# Patient Record
Sex: FEMALE | Race: WHITE | Employment: PART TIME | URBAN - METROPOLITAN AREA
[De-identification: names, ages, dates, MRNs, and addresses within clinical notes are randomized per-mention and may not be internally consistent; named-entity substitution may affect disease eponyms.]

---

## 2017-02-28 PROBLEM — E78.00 PRIMARY HYPERCHOLESTEROLEMIA: Status: ACTIVE | Noted: 2017-02-28

## 2017-05-01 PROBLEM — M25.511 CHRONIC RIGHT SHOULDER PAIN: Status: ACTIVE | Noted: 2017-05-01

## 2017-05-01 PROBLEM — G89.29 CHRONIC PAIN OF RIGHT KNEE: Status: ACTIVE | Noted: 2017-05-01

## 2017-05-01 PROBLEM — M25.561 CHRONIC PAIN OF RIGHT KNEE: Status: ACTIVE | Noted: 2017-05-01

## 2017-05-01 PROBLEM — G89.29 CHRONIC RIGHT SHOULDER PAIN: Status: ACTIVE | Noted: 2017-05-01

## 2017-06-09 PROBLEM — M75.40 SHOULDER IMPINGEMENT: Status: ACTIVE | Noted: 2017-06-09

## 2017-06-09 PROBLEM — M25.819 SHOULDER IMPINGEMENT: Status: ACTIVE | Noted: 2017-06-09

## 2017-06-09 PROBLEM — M17.11 PRIMARY OSTEOARTHRITIS OF RIGHT KNEE: Status: ACTIVE | Noted: 2017-06-09

## 2018-03-22 ENCOUNTER — OFFICE VISIT (OUTPATIENT)
Dept: FAMILY MEDICINE CLINIC | Age: 43
End: 2018-03-22
Payer: COMMERCIAL

## 2018-03-22 VITALS
SYSTOLIC BLOOD PRESSURE: 107 MMHG | HEIGHT: 63 IN | DIASTOLIC BLOOD PRESSURE: 60 MMHG | OXYGEN SATURATION: 98 % | RESPIRATION RATE: 16 BRPM | BODY MASS INDEX: 30.12 KG/M2 | WEIGHT: 170 LBS | TEMPERATURE: 97 F | HEART RATE: 54 BPM

## 2018-03-22 DIAGNOSIS — F41.1 GENERALIZED ANXIETY DISORDER: ICD-10-CM

## 2018-03-22 DIAGNOSIS — R51.9 CHRONIC DAILY HEADACHE: ICD-10-CM

## 2018-03-22 DIAGNOSIS — E78.00 PRIMARY HYPERCHOLESTEROLEMIA: Primary | ICD-10-CM

## 2018-03-22 DIAGNOSIS — M79.7 FIBROMYALGIA: ICD-10-CM

## 2018-03-22 PROCEDURE — 99213 OFFICE O/P EST LOW 20 MIN: CPT | Performed by: FAMILY MEDICINE

## 2018-03-22 PROCEDURE — 1036F TOBACCO NON-USER: CPT | Performed by: FAMILY MEDICINE

## 2018-03-22 PROCEDURE — G8427 DOCREV CUR MEDS BY ELIG CLIN: HCPCS | Performed by: FAMILY MEDICINE

## 2018-03-22 PROCEDURE — G8484 FLU IMMUNIZE NO ADMIN: HCPCS | Performed by: FAMILY MEDICINE

## 2018-03-22 PROCEDURE — G8417 CALC BMI ABV UP PARAM F/U: HCPCS | Performed by: FAMILY MEDICINE

## 2018-03-22 RX ORDER — MAPROTILINE HYDROCHLORIDE 50 MG/1
TABLET, FILM COATED ORAL
COMMUNITY
Start: 2018-03-19 | End: 2018-03-22

## 2018-03-22 NOTE — PATIENT INSTRUCTIONS
LOW SALT  AND LOW FAT DIET. CONTINUE CURRENT MEDICATIONS TAKING REGULARLY. REGULAR WALKING ADVISED. DISCUSSED AND ADVISED RELAXATION. OK TO GO TO PAIN CLINIC FOR PAIN MANAGEMENT. KEEP NEXT APPOINTMENT IN 2 MONTHS.

## 2018-03-22 NOTE — PROGRESS NOTES
OFFICE PROGRESS NOTE      SUBJECTIVE:        Patient ID:   Chucho Diallo is a 38 y.o. female who presents for   Chief Complaint   Patient presents with    Referral - General     pain managment [facility]    Abdominal Pain           HPI:     HAS PROBLEM WITH PAIN MANAGEMENT. PAIN IS SECONDARY TO FIBROMYALGIA. WATCHING DIET GOOD. NO  EXERCISE. TAKING SOME MEDICATIONS REGULARLY. CANNOT AFFORD OTHER MEDICATIONS BECAUSE OF LOSS OF INSURANCE. Prior to Admission medications    Medication Sig Start Date End Date Taking? Authorizing Provider   LORazepam (ATIVAN) 1 MG tablet Take 1 mg by mouth 2 times daily .  11/24/17  Yes Historical Provider, MD   tiZANidine (ZANAFLEX) 4 MG tablet Take 1 tablet by mouth every 6 hours as needed (tension headache) 8/14/17  Yes Yi Martines MD   amphetamine-dextroamphetamine (ADDERALL) 10 MG tablet take 1 tablet by mouth every morning 10/10/16  Yes Historical Provider, MD   vitamin D (CHOLECALCIFEROL) 1000 UNIT TABS tablet Take 1,000 Units by mouth daily   Yes Historical Provider, MD   gabapentin (NEURONTIN) 400 MG capsule Take 800 mg by mouth 4 times daily    Yes Historical Provider, MD   lubiprostone (AMITIZA) 24 MCG capsule Take 24 mcg by mouth daily (with breakfast)   Yes Historical Provider, MD     Social History     Social History    Marital status: Single     Spouse name: N/A    Number of children: N/A    Years of education: N/A     Social History Main Topics    Smoking status: Never Smoker    Smokeless tobacco: Never Used      Comment: patient is not a smoker    Alcohol use No    Drug use: No    Sexual activity: Not Asked     Other Topics Concern    None     Social History Narrative    None       I have reviewed Gualberto's allergies, medications, problem list, medical, social and family history and have updated as needed in the electronic medical record  Review Of Systems:    Skin: no abnormal pigmentation,

## 2018-03-28 ENCOUNTER — HOSPITAL ENCOUNTER (OUTPATIENT)
Dept: GENERAL RADIOLOGY | Age: 43
Discharge: HOME | End: 2018-03-30
Payer: COMMERCIAL

## 2018-03-28 ENCOUNTER — HOSPITAL ENCOUNTER (OUTPATIENT)
Age: 43
Discharge: HOME | End: 2018-03-30
Payer: COMMERCIAL

## 2018-03-28 DIAGNOSIS — R10.9 ABDOMINAL PAIN, UNSPECIFIED ABDOMINAL LOCATION: ICD-10-CM

## 2018-03-28 DIAGNOSIS — K59.00 CONSTIPATION, UNSPECIFIED CONSTIPATION TYPE: ICD-10-CM

## 2018-03-28 PROCEDURE — 74018 RADEX ABDOMEN 1 VIEW: CPT

## 2018-05-08 ENCOUNTER — TELEPHONE (OUTPATIENT)
Dept: FAMILY MEDICINE CLINIC | Age: 43
End: 2018-05-08

## 2018-05-09 ENCOUNTER — OFFICE VISIT (OUTPATIENT)
Dept: FAMILY MEDICINE CLINIC | Age: 43
End: 2018-05-09
Payer: COMMERCIAL

## 2018-05-09 VITALS
RESPIRATION RATE: 16 BRPM | BODY MASS INDEX: 28.84 KG/M2 | SYSTOLIC BLOOD PRESSURE: 118 MMHG | OXYGEN SATURATION: 99 % | HEART RATE: 61 BPM | WEIGHT: 162.75 LBS | HEIGHT: 63 IN | TEMPERATURE: 97.8 F | DIASTOLIC BLOOD PRESSURE: 72 MMHG

## 2018-05-09 DIAGNOSIS — F13.930 BENZODIAZEPINE WITHDRAWAL WITHOUT COMPLICATION (HCC): Primary | ICD-10-CM

## 2018-05-09 PROCEDURE — G8427 DOCREV CUR MEDS BY ELIG CLIN: HCPCS | Performed by: NURSE PRACTITIONER

## 2018-05-09 PROCEDURE — 1036F TOBACCO NON-USER: CPT | Performed by: NURSE PRACTITIONER

## 2018-05-09 PROCEDURE — G8417 CALC BMI ABV UP PARAM F/U: HCPCS | Performed by: NURSE PRACTITIONER

## 2018-05-09 PROCEDURE — 99214 OFFICE O/P EST MOD 30 MIN: CPT | Performed by: NURSE PRACTITIONER

## 2018-05-09 ASSESSMENT — ENCOUNTER SYMPTOMS
COUGH: 0
WHEEZING: 0
SHORTNESS OF BREATH: 0
COLOR CHANGE: 0

## 2018-05-10 ENCOUNTER — HOSPITAL ENCOUNTER (EMERGENCY)
Age: 43
Discharge: HOME | End: 2018-05-10
Payer: COMMERCIAL

## 2018-05-10 VITALS
SYSTOLIC BLOOD PRESSURE: 117 MMHG | RESPIRATION RATE: 18 BRPM | OXYGEN SATURATION: 100 % | HEART RATE: 50 BPM | BODY MASS INDEX: 29.05 KG/M2 | TEMPERATURE: 98.2 F | DIASTOLIC BLOOD PRESSURE: 65 MMHG | WEIGHT: 164 LBS

## 2018-05-10 DIAGNOSIS — F41.1 ANXIETY STATE: Primary | ICD-10-CM

## 2018-05-10 PROCEDURE — 99212 OFFICE O/P EST SF 10 MIN: CPT

## 2018-05-10 RX ORDER — ONDANSETRON 4 MG/1
4 TABLET, FILM COATED ORAL EVERY 8 HOURS PRN
Qty: 12 TABLET | Refills: 0 | Status: SHIPPED | OUTPATIENT
Start: 2018-05-10 | End: 2018-05-15

## 2018-05-10 RX ORDER — BUSPIRONE HYDROCHLORIDE 10 MG/1
30 TABLET ORAL 2 TIMES DAILY
COMMUNITY
End: 2019-09-05

## 2018-05-10 RX ORDER — HYDROXYZINE PAMOATE 50 MG/1
50 CAPSULE ORAL 3 TIMES DAILY PRN
Qty: 21 CAPSULE | Refills: 0 | Status: SHIPPED | OUTPATIENT
Start: 2018-05-10 | End: 2018-05-17

## 2018-06-05 ENCOUNTER — TELEPHONE (OUTPATIENT)
Dept: FAMILY MEDICINE CLINIC | Age: 43
End: 2018-06-05

## 2018-07-18 ENCOUNTER — CARE COORDINATION (OUTPATIENT)
Dept: CARE COORDINATION | Age: 43
End: 2018-07-18

## 2018-08-01 ENCOUNTER — CARE COORDINATION (OUTPATIENT)
Dept: CARE COORDINATION | Age: 43
End: 2018-08-01

## 2018-08-01 NOTE — CARE COORDINATION
Patient returned [address] call - Stated she was not interested in program and is no longer a patient on [facility]. Patient is on waiting list and does not want to be called again for these services.

## 2018-09-05 ENCOUNTER — HOSPITAL ENCOUNTER (OUTPATIENT)
Age: 43
Discharge: HOME | End: 2018-09-05
Payer: COMMERCIAL

## 2018-09-05 LAB
FOLLICLE STIMULATING HORMONE: 6.1 MIU/ML
TSH SERPL DL<=0.05 MIU/L-ACNC: 3.46 UIU/ML (ref 0.27–4.2)

## 2018-09-05 PROCEDURE — 36415 COLL VENOUS BLD VENIPUNCTURE: CPT

## 2018-09-05 PROCEDURE — 84443 ASSAY THYROID STIM HORMONE: CPT

## 2018-09-05 PROCEDURE — 83001 ASSAY OF GONADOTROPIN (FSH): CPT

## 2018-09-24 ENCOUNTER — HOSPITAL ENCOUNTER (OUTPATIENT)
Age: 43
Discharge: HOME | End: 2018-09-26
Payer: COMMERCIAL

## 2018-09-24 ENCOUNTER — HOSPITAL ENCOUNTER (OUTPATIENT)
Dept: GENERAL RADIOLOGY | Age: 43
Discharge: HOME | End: 2018-09-26
Payer: COMMERCIAL

## 2018-09-24 DIAGNOSIS — R10.9 ABDOMINAL PAIN, UNSPECIFIED ABDOMINAL LOCATION: ICD-10-CM

## 2018-09-24 PROCEDURE — 74018 RADEX ABDOMEN 1 VIEW: CPT

## 2018-10-28 ENCOUNTER — APPOINTMENT (OUTPATIENT)
Dept: GENERAL RADIOLOGY | Age: 43
End: 2018-10-28
Payer: COMMERCIAL

## 2018-10-28 ENCOUNTER — HOSPITAL ENCOUNTER (EMERGENCY)
Age: 43
Discharge: HOME | End: 2018-10-28
Payer: COMMERCIAL

## 2018-10-28 VITALS
SYSTOLIC BLOOD PRESSURE: 116 MMHG | HEART RATE: 77 BPM | RESPIRATION RATE: 16 BRPM | OXYGEN SATURATION: 98 % | DIASTOLIC BLOOD PRESSURE: 75 MMHG | TEMPERATURE: 98.3 F

## 2018-10-28 DIAGNOSIS — N30.00 ACUTE CYSTITIS WITHOUT HEMATURIA: ICD-10-CM

## 2018-10-28 DIAGNOSIS — M54.6 ACUTE RIGHT-SIDED THORACIC BACK PAIN: Primary | ICD-10-CM

## 2018-10-28 LAB
ANION GAP SERPL CALCULATED.3IONS-SCNC: 10 MMOL/L (ref 7–16)
BACTERIA: ABNORMAL /HPF
BASOPHILS ABSOLUTE: 0.02 E9/L (ref 0–0.2)
BASOPHILS RELATIVE PERCENT: 0.3 % (ref 0–2)
BILIRUBIN URINE: NEGATIVE
BLOOD, URINE: NEGATIVE
BUN BLDV-MCNC: 15 MG/DL (ref 6–20)
CALCIUM SERPL-MCNC: 9.6 MG/DL (ref 8.6–10.2)
CHLORIDE BLD-SCNC: 102 MMOL/L (ref 98–107)
CLARITY: CLEAR
CO2: 27 MMOL/L (ref 22–29)
COLOR: YELLOW
CREAT SERPL-MCNC: 0.9 MG/DL (ref 0.5–1)
D DIMER: 201 NG/ML DDU
EKG ATRIAL RATE: 65 BPM
EKG P AXIS: 66 DEGREES
EKG P-R INTERVAL: 126 MS
EKG Q-T INTERVAL: 378 MS
EKG QRS DURATION: 90 MS
EKG QTC CALCULATION (BAZETT): 393 MS
EKG R AXIS: 59 DEGREES
EKG T AXIS: 36 DEGREES
EKG VENTRICULAR RATE: 65 BPM
EOSINOPHILS ABSOLUTE: 0.08 E9/L (ref 0.05–0.5)
EOSINOPHILS RELATIVE PERCENT: 1.1 % (ref 0–6)
EPITHELIAL CELLS, UA: ABNORMAL /HPF
GFR AFRICAN AMERICAN: >60
GFR NON-AFRICAN AMERICAN: >60 ML/MIN/1.73
GLUCOSE BLD-MCNC: 138 MG/DL (ref 74–109)
GLUCOSE URINE: NEGATIVE MG/DL
HCT VFR BLD CALC: 44.8 % (ref 34–48)
HEMOGLOBIN: 14.5 G/DL (ref 11.5–15.5)
IMMATURE GRANULOCYTES #: 0.02 E9/L
IMMATURE GRANULOCYTES %: 0.3 % (ref 0–5)
KETONES, URINE: NEGATIVE MG/DL
LEUKOCYTE ESTERASE, URINE: ABNORMAL
LYMPHOCYTES ABSOLUTE: 1.32 E9/L (ref 1.5–4)
LYMPHOCYTES RELATIVE PERCENT: 17.5 % (ref 20–42)
MCH RBC QN AUTO: 29.1 PG (ref 26–35)
MCHC RBC AUTO-ENTMCNC: 32.4 % (ref 32–34.5)
MCV RBC AUTO: 90 FL (ref 80–99.9)
MONOCYTES ABSOLUTE: 0.26 E9/L (ref 0.1–0.95)
MONOCYTES RELATIVE PERCENT: 3.5 % (ref 2–12)
NEUTROPHILS ABSOLUTE: 5.83 E9/L (ref 1.8–7.3)
NEUTROPHILS RELATIVE PERCENT: 77.3 % (ref 43–80)
NITRITE, URINE: POSITIVE
PDW BLD-RTO: 12.4 FL (ref 11.5–15)
PH UA: 6 (ref 5–9)
PLATELET # BLD: 201 E9/L (ref 130–450)
PMV BLD AUTO: 10.4 FL (ref 7–12)
POTASSIUM SERPL-SCNC: 4.7 MMOL/L (ref 3.5–5)
PROTEIN UA: NEGATIVE MG/DL
RBC # BLD: 4.98 E12/L (ref 3.5–5.5)
RBC UA: ABNORMAL /HPF (ref 0–2)
SODIUM BLD-SCNC: 139 MMOL/L (ref 132–146)
SPECIFIC GRAVITY UA: >=1.03 (ref 1–1.03)
T4 FREE: 0.89 NG/DL (ref 0.93–1.7)
TROPONIN: <0.01 NG/ML (ref 0–0.03)
TSH SERPL DL<=0.05 MIU/L-ACNC: 1.27 UIU/ML (ref 0.27–4.2)
UROBILINOGEN, URINE: 0.2 E.U./DL
WBC # BLD: 7.5 E9/L (ref 4.5–11.5)
WBC UA: >20 /HPF (ref 0–5)

## 2018-10-28 PROCEDURE — 96375 TX/PRO/DX INJ NEW DRUG ADDON: CPT

## 2018-10-28 PROCEDURE — 87088 URINE BACTERIA CULTURE: CPT

## 2018-10-28 PROCEDURE — 80048 BASIC METABOLIC PNL TOTAL CA: CPT

## 2018-10-28 PROCEDURE — 81001 URINALYSIS AUTO W/SCOPE: CPT

## 2018-10-28 PROCEDURE — 93005 ELECTROCARDIOGRAM TRACING: CPT | Performed by: NURSE PRACTITIONER

## 2018-10-28 PROCEDURE — 6360000002 HC RX W HCPCS: Performed by: NURSE PRACTITIONER

## 2018-10-28 PROCEDURE — 72072 X-RAY EXAM THORAC SPINE 3VWS: CPT

## 2018-10-28 PROCEDURE — 84439 ASSAY OF FREE THYROXINE: CPT

## 2018-10-28 PROCEDURE — 87077 CULTURE AEROBIC IDENTIFY: CPT

## 2018-10-28 PROCEDURE — 2580000003 HC RX 258: Performed by: NURSE PRACTITIONER

## 2018-10-28 PROCEDURE — 85025 COMPLETE CBC W/AUTO DIFF WBC: CPT

## 2018-10-28 PROCEDURE — 87186 SC STD MICRODIL/AGAR DIL: CPT

## 2018-10-28 PROCEDURE — 84443 ASSAY THYROID STIM HORMONE: CPT

## 2018-10-28 PROCEDURE — 71046 X-RAY EXAM CHEST 2 VIEWS: CPT

## 2018-10-28 PROCEDURE — 96374 THER/PROPH/DIAG INJ IV PUSH: CPT

## 2018-10-28 PROCEDURE — 99284 EMERGENCY DEPT VISIT MOD MDM: CPT

## 2018-10-28 PROCEDURE — 84484 ASSAY OF TROPONIN QUANT: CPT

## 2018-10-28 PROCEDURE — 36415 COLL VENOUS BLD VENIPUNCTURE: CPT

## 2018-10-28 PROCEDURE — 85378 FIBRIN DEGRADE SEMIQUANT: CPT

## 2018-10-28 RX ORDER — KETOROLAC TROMETHAMINE 15 MG/ML
15 INJECTION, SOLUTION INTRAMUSCULAR; INTRAVENOUS ONCE
Status: COMPLETED | OUTPATIENT
Start: 2018-10-28 | End: 2018-10-28

## 2018-10-28 RX ORDER — SODIUM CHLORIDE 0.9 % (FLUSH) 0.9 %
SYRINGE (ML) INJECTION
Status: DISCONTINUED
Start: 2018-10-28 | End: 2018-10-28 | Stop reason: HOSPADM

## 2018-10-28 RX ORDER — NAPROXEN 500 MG/1
500 TABLET ORAL 2 TIMES DAILY WITH MEALS
Qty: 28 TABLET | Refills: 0 | Status: SHIPPED | OUTPATIENT
Start: 2018-10-28 | End: 2019-03-22 | Stop reason: ALTCHOICE

## 2018-10-28 RX ORDER — CEFDINIR 300 MG/1
300 CAPSULE ORAL 2 TIMES DAILY
Qty: 20 CAPSULE | Refills: 0 | Status: SHIPPED | OUTPATIENT
Start: 2018-10-28 | End: 2018-11-07

## 2018-10-28 RX ORDER — 0.9 % SODIUM CHLORIDE 0.9 %
500 INTRAVENOUS SOLUTION INTRAVENOUS ONCE
Status: COMPLETED | OUTPATIENT
Start: 2018-10-28 | End: 2018-10-28

## 2018-10-28 RX ADMIN — SODIUM CHLORIDE 500 ML: 9 INJECTION, SOLUTION INTRAVENOUS at 11:43

## 2018-10-28 RX ADMIN — WATER 1 G: 1 INJECTION, SOLUTION INTRAVENOUS at 13:14

## 2018-10-28 RX ADMIN — KETOROLAC TROMETHAMINE 15 MG: 15 INJECTION, SOLUTION INTRAMUSCULAR; INTRAVENOUS at 11:43

## 2018-10-28 ASSESSMENT — PAIN DESCRIPTION - FREQUENCY: FREQUENCY: CONTINUOUS

## 2018-10-28 ASSESSMENT — PAIN DESCRIPTION - ORIENTATION: ORIENTATION: MID

## 2018-10-28 ASSESSMENT — PAIN SCALES - GENERAL
PAINLEVEL_OUTOF10: 5
PAINLEVEL_OUTOF10: 5
PAINLEVEL_OUTOF10: 3

## 2018-10-28 ASSESSMENT — PAIN DESCRIPTION - DESCRIPTORS: DESCRIPTORS: TIGHTNESS;SHARP

## 2018-10-28 ASSESSMENT — PAIN DESCRIPTION - LOCATION: LOCATION: BACK

## 2018-10-28 ASSESSMENT — PAIN DESCRIPTION - PAIN TYPE: TYPE: ACUTE PAIN

## 2018-10-28 NOTE — ED NOTES
Bed: 20  Expected date:   Expected time:   Means of arrival:   Comments:     Maryjane Kirby RN  10/28/18 1190

## 2018-10-28 NOTE — ED PROVIDER NOTES
her pain is not over the flank and she has no other significant findings that would raise suspicion for pyelonephritis including she is without fever, no leukocytosis, no tachycardia and she is nontoxic in appearance. She is given prescription for GUSMAN KIN for home as well as anti-inflammatories. She is advised to follow closely with her primary care provider, and be seen in the office within 1 week of today's visit. She is advised with any new, changing, worsening symptoms or concerns she should return to the ER and she voices understanding. Counseling:   I have spoken with the patient and discussed todays results, in addition to providing specific details for the plan of care and counseling regarding the diagnosis and prognosis and are agreeable with the plan. Assessment      1. Acute right-sided thoracic back pain    2. Acute cystitis without hematuria      This patient's ED course included: a personal history and physicial examination, re-evaluation prior to disposition, multiple bedside re-evaluations, IV medications and complex medical decision making and emergency management  This patient has remained hemodynamically stable, improved and been closely monitored during their ED course. Plan   Discharge to home. Patient condition is good. New Medications     New Prescriptions    CEFDINIR (OMNICEF) 300 MG CAPSULE    Take 1 capsule by mouth 2 times daily for 10 days    NAPROXEN (NAPROXEN) 500 MG EC TABLET    Take 1 tablet by mouth 2 times daily (with meals) for 14 days     Electronically signed by DIANA Campbell CNP   DD: 10/28/18  **This report was transcribed using voice recognition software. Every effort was made to ensure accuracy; however, inadvertent computerized transcription errors may be present.   END OF PROVIDER NOTE        DIANA Campbell CNP  10/28/18 2880

## 2018-10-30 LAB
ORGANISM: ABNORMAL
URINE CULTURE, ROUTINE: ABNORMAL
URINE CULTURE, ROUTINE: ABNORMAL

## 2019-03-22 ENCOUNTER — HOSPITAL ENCOUNTER (EMERGENCY)
Age: 44
Discharge: HOME | End: 2019-03-22
Payer: COMMERCIAL

## 2019-03-22 ENCOUNTER — APPOINTMENT (OUTPATIENT)
Dept: GENERAL RADIOLOGY | Age: 44
End: 2019-03-22
Payer: COMMERCIAL

## 2019-03-22 VITALS
WEIGHT: 172 LBS | SYSTOLIC BLOOD PRESSURE: 114 MMHG | HEART RATE: 72 BPM | DIASTOLIC BLOOD PRESSURE: 75 MMHG | TEMPERATURE: 98 F | BODY MASS INDEX: 30.47 KG/M2 | RESPIRATION RATE: 16 BRPM | OXYGEN SATURATION: 96 %

## 2019-03-22 DIAGNOSIS — S92.912A CLOSED DISPLACED FRACTURE OF PHALANX OF TOE OF LEFT FOOT, UNSPECIFIED TOE, INITIAL ENCOUNTER: Primary | ICD-10-CM

## 2019-03-22 PROCEDURE — 73630 X-RAY EXAM OF FOOT: CPT

## 2019-03-22 PROCEDURE — 99212 OFFICE O/P EST SF 10 MIN: CPT

## 2019-03-22 RX ORDER — NAPROXEN 500 MG/1
500 TABLET ORAL 2 TIMES DAILY
Qty: 14 TABLET | Refills: 0 | Status: SHIPPED | OUTPATIENT
Start: 2019-03-22 | End: 2019-09-05 | Stop reason: ALTCHOICE

## 2019-03-22 RX ORDER — MAPROTILINE HYDROCHLORIDE 25 MG/1
25 TABLET, FILM COATED ORAL NIGHTLY
COMMUNITY
End: 2019-09-05 | Stop reason: ALTCHOICE

## 2019-03-22 RX ORDER — POLYETHYLENE GLYCOL 3350 17 G/17G
17 POWDER, FOR SOLUTION ORAL DAILY
COMMUNITY
End: 2021-07-22

## 2019-03-22 ASSESSMENT — PAIN DESCRIPTION - PAIN TYPE: TYPE: ACUTE PAIN

## 2019-03-22 ASSESSMENT — PAIN DESCRIPTION - ORIENTATION: ORIENTATION: LEFT

## 2019-03-22 ASSESSMENT — PAIN DESCRIPTION - DESCRIPTORS: DESCRIPTORS: ACHING

## 2019-03-22 ASSESSMENT — PAIN DESCRIPTION - LOCATION: LOCATION: FOOT

## 2019-08-28 ENCOUNTER — HOSPITAL ENCOUNTER (OUTPATIENT)
Dept: GENERAL RADIOLOGY | Age: 44
Discharge: HOME | End: 2019-08-30
Payer: COMMERCIAL

## 2019-08-28 ENCOUNTER — HOSPITAL ENCOUNTER (OUTPATIENT)
Age: 44
Discharge: HOME | End: 2019-08-30
Payer: COMMERCIAL

## 2019-08-28 DIAGNOSIS — M77.8 WRIST TENDONITIS: ICD-10-CM

## 2019-08-28 PROCEDURE — 73110 X-RAY EXAM OF WRIST: CPT

## 2019-09-05 ENCOUNTER — OFFICE VISIT (OUTPATIENT)
Dept: PHYSICAL MEDICINE AND REHAB | Age: 44
End: 2019-09-05
Payer: COMMERCIAL

## 2019-09-05 VITALS — HEIGHT: 63 IN | WEIGHT: 160 LBS | BODY MASS INDEX: 28.35 KG/M2

## 2019-09-05 DIAGNOSIS — M25.531 RIGHT WRIST PAIN: Primary | ICD-10-CM

## 2019-09-05 DIAGNOSIS — R20.0 NUMBNESS AND TINGLING IN RIGHT HAND: ICD-10-CM

## 2019-09-05 DIAGNOSIS — R20.2 NUMBNESS AND TINGLING IN RIGHT HAND: ICD-10-CM

## 2019-09-05 PROCEDURE — G8419 CALC BMI OUT NRM PARAM NOF/U: HCPCS | Performed by: PHYSICAL MEDICINE & REHABILITATION

## 2019-09-05 PROCEDURE — 95912 NRV CNDJ TEST 11-12 STUDIES: CPT | Performed by: PHYSICAL MEDICINE & REHABILITATION

## 2019-09-05 PROCEDURE — 95886 MUSC TEST DONE W/N TEST COMP: CPT | Performed by: PHYSICAL MEDICINE & REHABILITATION

## 2019-09-05 PROCEDURE — 99202 OFFICE O/P NEW SF 15 MIN: CPT | Performed by: PHYSICAL MEDICINE & REHABILITATION

## 2019-09-05 PROCEDURE — G8427 DOCREV CUR MEDS BY ELIG CLIN: HCPCS | Performed by: PHYSICAL MEDICINE & REHABILITATION

## 2019-09-05 PROCEDURE — 1036F TOBACCO NON-USER: CPT | Performed by: PHYSICAL MEDICINE & REHABILITATION

## 2019-09-05 RX ORDER — TIZANIDINE 4 MG/1
4 TABLET ORAL 3 TIMES DAILY
COMMUNITY

## 2019-09-05 RX ORDER — MELOXICAM 15 MG/1
15 TABLET ORAL DAILY
COMMUNITY
End: 2019-11-26

## 2019-09-05 NOTE — PROGRESS NOTES
[facility]  Electrodiagnostic Laboratory  *Accredited by the [address] [place] [address] with exemplary status  [address]  Phone: [phone]  Fax: [phone]    Referring Provider: Pb Sunshine MD  Primary Care Physician: Nicholas Holloway MD  Patient Name: Torey Weinstein  Patient YOB: 1975  Gender: female  BMI: Body mass index is 28.34 kg/m². Height 5' 3\" (1.6 m), weight 160 lb (72.6 kg), last menstrual period 03/23/2016, not currently breastfeeding. 9/5/2019    Description of clinical problem:   Chief Complaint   Patient presents with    Hand Pain     Right hand pain     Wrist Pain     Right wrist pain     Hand Numbness     Right hand numbness     Extremity Weakness     Right hand weakness      Pain Yes  Pain Score:   5; Numbness/tingling  Yes; Weakness  Yes       Sensory NCS      Nerve / Sites Rec. Site Peak Lat PP Amp Segments Distance Velocity Temp. ms µV  cm m/s °C   R Median - Digit II (Antidromic)      Palm Dig II 1.77 34.4 Palm - Dig II 7 58 33.6      Wrist Dig II 3.18 31.9 Wrist - Dig II 14 58 33.6   R Ulnar - Digit V (Antidromic)      Wrist Dig V 3.18 26.4 Wrist - Dig V 14 54 34.2   R Radial - Anatomical snuff box (Forearm)      Forearm Wrist 2.50 28.4 Forearm - Wrist 10 53 33.6       Combined Sensory Index      Nerve / Sites Rec. Site Peak Lat NP Amp PP Amp Segments Dist. Peak Diff Temp.      ms µV µV  cm ms °C   R Median - CSI      Median Thumb 2.71 18.1 27.0 Median - Radial 10 0.26 33.4      Radial Thumb 2.45 4.9 5.5 Median - Ulnar 14 0.00 33.4      Median Ring 3.13 10.1 12.3 Median palm - Ulnar palm 8 0.00 33.4      Ulnar Ring 3.13 6.4 10.9          Median palm Wrist 1.98 15.6 27.1          Ulnar palm Wrist 1.98 23.4 32.0          CSI     CSI  0.26        Motor NCS      Nerve / Sites Muscle Onset Amplitude Segments Distance Velocity Temp.     ms mV  cm m/s °C   R Median - APB      Palm APB 2.03 10.0 Palm - APB   33.6      Wrist APB 3.33

## 2019-09-05 NOTE — PATIENT INSTRUCTIONS
Electrodiagnotic Laboratory  Accredited [facility] the [facility] with [facility]  FENG Poole D.O. [facility]  [address] [place]  Phone: [phone]  Fax: [phone]        Today you had an electrodiagnostic exam which included nerve conduction studies (NCS) and electromyography (EMG). This test evaluated the electrical activity of your nerves and muscles to help determine if you have a nerve or muscle disease. This test can help determine the location and type of a nerve or muscle problem. This will help your referring doctor diagnose your condition and determine the appropriate next step in your treatment plan. After your test:    1. There are no long lasting side effects of the test.     2. You may resume your normal activities without restrictions. 3.  Resume any medications that were stopped for the test.     4  If you have sore areas or bruising in your muscles where the needle was placed, apply a cold pack to the sore area for 15-20 minutes three to four times a day as needed for pain. The soreness should go away in about 1-2 days. 5. Your results were provided  Briefly at the end of your test and the final detailed report will be provided to your referring physician, and/or primary care physician and any other parties you requested within 1-2 days of the examination. You may wish to contact your referring provider after a few days to determine what they would like you to do next. 6.  Please call [phone] with any questions or concerns and if you develop increased body temperature/fever, swelling, tenderness, increased pain and/or drainage from the sites where the needle was placed. Thank you for choosing us for your health care needs.

## 2019-11-22 ENCOUNTER — TELEPHONE (OUTPATIENT)
Dept: ORTHOPEDIC SURGERY | Age: 44
End: 2019-11-22

## 2019-11-26 ENCOUNTER — OFFICE VISIT (OUTPATIENT)
Dept: ORTHOPEDIC SURGERY | Age: 44
End: 2019-11-26
Payer: COMMERCIAL

## 2019-11-26 VITALS — BODY MASS INDEX: 28.35 KG/M2 | WEIGHT: 160 LBS | HEIGHT: 63 IN

## 2019-11-26 DIAGNOSIS — G56.40 COMPLEX REGIONAL PAIN SYNDROME TYPE 2, AFFECTING UNSPECIFIED SITE: Primary | ICD-10-CM

## 2019-11-26 PROCEDURE — G8419 CALC BMI OUT NRM PARAM NOF/U: HCPCS | Performed by: ORTHOPAEDIC SURGERY

## 2019-11-26 PROCEDURE — G8428 CUR MEDS NOT DOCUMENT: HCPCS | Performed by: ORTHOPAEDIC SURGERY

## 2019-11-26 PROCEDURE — G8484 FLU IMMUNIZE NO ADMIN: HCPCS | Performed by: ORTHOPAEDIC SURGERY

## 2019-11-26 PROCEDURE — 99204 OFFICE O/P NEW MOD 45 MIN: CPT | Performed by: ORTHOPAEDIC SURGERY

## 2019-11-26 PROCEDURE — 1036F TOBACCO NON-USER: CPT | Performed by: ORTHOPAEDIC SURGERY

## 2019-11-26 RX ORDER — BUSPIRONE HYDROCHLORIDE 30 MG/1
30 TABLET ORAL 2 TIMES DAILY
Refills: 0 | COMMUNITY
Start: 2019-11-09 | End: 2021-10-19

## 2020-02-28 ENCOUNTER — OFFICE VISIT (OUTPATIENT)
Dept: ORTHOPEDIC SURGERY | Age: 45
End: 2020-02-28
Payer: COMMERCIAL

## 2020-02-28 VITALS — HEIGHT: 63 IN | WEIGHT: 160 LBS | BODY MASS INDEX: 28.35 KG/M2

## 2020-02-28 PROCEDURE — 1036F TOBACCO NON-USER: CPT | Performed by: ORTHOPAEDIC SURGERY

## 2020-02-28 PROCEDURE — G8419 CALC BMI OUT NRM PARAM NOF/U: HCPCS | Performed by: ORTHOPAEDIC SURGERY

## 2020-02-28 PROCEDURE — 99214 OFFICE O/P EST MOD 30 MIN: CPT | Performed by: ORTHOPAEDIC SURGERY

## 2020-02-28 PROCEDURE — G8484 FLU IMMUNIZE NO ADMIN: HCPCS | Performed by: ORTHOPAEDIC SURGERY

## 2020-02-28 PROCEDURE — G8428 CUR MEDS NOT DOCUMENT: HCPCS | Performed by: ORTHOPAEDIC SURGERY

## 2020-02-28 NOTE — PROGRESS NOTES
on file     Physically abused: Not on file     Forced sexual activity: Not on file   Other Topics Concern    Not on file   Social History Narrative    Not on file     Family History   Problem Relation Age of Onset    Diabetes Father     High Blood Pressure Father     Emphysema Father            Physical Exam:    Ht 5' 3\" (1.6 m)   Wt 160 lb (72.6 kg)   LMP 03/23/2016   BMI 28.34 kg/m²     GENERAL: alert, appears stated age, cooperative, no acute distress    HEENT: Head is normocephalic, atraumatic. PERRLA. SKIN: Clean, dry, intact. There is not any cellulitis or cutaneous lesions noted in the lower extremities except noted below in MSK    PULMONARY: breathing is regular and unlabored, no acute distress    CV: The bilateral upper and lower extremities are warm and well-perfused with brisk capillary refill. 2+ pulses UE and LE bilateral.     PSYCHIATRY: Pleasant mood, appropriate behavior, follows commands    NEURO: Sensation is intact distally with light touch with no alteration. Motor exam of the lower extremities show quadriceps, hamstrings, foot dorsiflexion and plantarflexion grossly intact 5/5. LYMPH: No lymphedema present distally in upper or lower extrimity. MUSCULOSKELETAL:  Gait: Patient walks around the exam room and in the hallway with a normal gait and no limp. Balance and coordination are age appropriate. Shoulder Exam:  Full range of motion without pain. Elbow Exam:  Full painless range of motion. No effusion. No tenderness to palpation. Wrist/Hand Exam:  No swelling in the Right wrist. Non tender to palpation. Decreased range of motion secondary to pain. Snuffbox is not tender to palpation. Flexor and extensor tendons are intact. Positive Tinel's, positive carpal compression test.      Imaging:  No acute abnormality    Mireya Castro was seen today for wrist pain.     Diagnoses and all orders for this visit:    Complex regional pain syndrome type 2, affecting unspecified site  - [address], [place], Physical Medicine and Rehabilitation, [facility]      Patient seen and examined. X-rays reviewed. Patient has complaints mostly  consistent with carpal tunnel especially in the right wrist.  In addition, exam is consistent with this as well. However nerve study was performed to review showing no evidence of carpal tunnel or radiculopathy. This is somewhat unusual since patient presents with the symptoms. It was discussed with patient in detail  surgical options although surgery would be a little of no benefit with negative study. Patient agrees with this and appears that patient may be suffering from possibly complex regional pain syndrome. Patient has tried bracing and pain cream without relief. At this point with continued symptoms despite findings consistent with carpal tunnel syndrome versus radiculopathy, it is recommend the patient follow-up with physical medicine rehabilitation for further evaluation management. Patient verbalized understanding and wishes to proceed in this manner. Twin Negro DO      25 minutes was spent with patient. 50% or greater was spent counseling the patient. This dictation was performed with a verbal recognition program (DRAGON) and it was checked for errors. It is possible that there are still dictated errors within this office note. If so, please bring any errors to my attention for an addendum. All efforts were made to ensure that this office note is accurate.

## 2020-04-29 ENCOUNTER — HOSPITAL ENCOUNTER (OUTPATIENT)
Dept: GENERAL RADIOLOGY | Age: 45
Discharge: HOME | End: 2020-05-01
Payer: COMMERCIAL

## 2020-04-29 ENCOUNTER — HOSPITAL ENCOUNTER (OUTPATIENT)
Age: 45
Discharge: HOME | End: 2020-05-01
Payer: COMMERCIAL

## 2020-04-29 PROCEDURE — 73030 X-RAY EXAM OF SHOULDER: CPT

## 2020-06-04 ENCOUNTER — OFFICE VISIT (OUTPATIENT)
Dept: ORTHOPEDIC SURGERY | Age: 45
End: 2020-06-04
Payer: COMMERCIAL

## 2020-06-04 VITALS — HEIGHT: 63 IN | BODY MASS INDEX: 28.35 KG/M2 | WEIGHT: 160 LBS

## 2020-06-04 PROCEDURE — 99214 OFFICE O/P EST MOD 30 MIN: CPT | Performed by: ORTHOPAEDIC SURGERY

## 2020-06-04 PROCEDURE — 1036F TOBACCO NON-USER: CPT | Performed by: ORTHOPAEDIC SURGERY

## 2020-06-04 PROCEDURE — G8427 DOCREV CUR MEDS BY ELIG CLIN: HCPCS | Performed by: ORTHOPAEDIC SURGERY

## 2020-06-04 PROCEDURE — G8419 CALC BMI OUT NRM PARAM NOF/U: HCPCS | Performed by: ORTHOPAEDIC SURGERY

## 2020-06-04 NOTE — PROGRESS NOTES
Chief Complaint   Patient presents with    Shoulder Pain     Right shoulder pain for a couple months. She reports had scope about 15 years ago. No recent injury. HPI:    Patient is 53 y.o. female complaining of right shoulder pain and weakness for the past few months. She denies a specific traumatic injury to the right shoulder. Previous treatments include a shoulder arthroscopy about 15 years ago. Recently she has been trying topical pain cream and home exercises from PCP without much relief. She denies any other orthopedic complaints. ROS:    Skin: (-) rash,(-) psoriasis,(-) eczema, (-)skin cancer. Neurologic: (-)numbness, (-)tingling, (-)headaches, (-) LOC. Cardiovascular: (-) Chest pain, (-) swelling in legs/feet, (-) SOB, (-) cramping in legs/feet with walking.     All other review of systems negative except stated above or in HPI      Past Medical History:   Diagnosis Date    Anemia     Anxiety     Arthritis     Asthma     DDD (degenerative disc disease)     Depression     Fibromyalgia     IBS (irritable bowel syndrome)     Migraines     Pain     PTSD (post-traumatic stress disorder)      Past Surgical History:   Procedure Laterality Date    ABDOMEN SURGERY      ABDOMINAL EXPLORATION SURGERY      COLONOSCOPY      HYSTERECTOMY      KNEE SURGERY      cut 2 ligaments right    NERVE BLOCK Bilateral 10 29 13    si inj #1    NERVE BLOCK  11/05/13    sacroiliac joint bilateral #2    OTHER SURGICAL HISTORY      epidurals    OTHER SURGICAL HISTORY  1/10/13    plif L4-5, L5-S1 with cages rods and screws    SHOULDER SURGERY      right    SPINE SURGERY  1/10/13    l4-5 & s1    TONSILLECTOMY         Current Outpatient Medications:     busPIRone (BUSPAR) 30 MG tablet, , Disp: , Rfl: 0    sertraline (ZOLOFT) 100 MG tablet, Take 1 tablet by mouth daily, Disp: , Rfl: 0    tiZANidine (ZANAFLEX) 4 MG tablet, Take 4 mg by mouth every 6 hours as needed, Disp: , Rfl:     polyethylene

## 2020-06-16 ENCOUNTER — HOSPITAL ENCOUNTER (OUTPATIENT)
Dept: MRI IMAGING | Age: 45
Discharge: HOME | End: 2020-06-18
Payer: COMMERCIAL

## 2020-06-16 PROCEDURE — 73221 MRI JOINT UPR EXTREM W/O DYE: CPT

## 2020-06-17 ENCOUNTER — OFFICE VISIT (OUTPATIENT)
Dept: ORTHOPEDIC SURGERY | Age: 45
End: 2020-06-17
Payer: COMMERCIAL

## 2020-06-17 VITALS — WEIGHT: 140 LBS | TEMPERATURE: 98 F | BODY MASS INDEX: 24.8 KG/M2 | HEIGHT: 63 IN

## 2020-06-17 PROCEDURE — 99214 OFFICE O/P EST MOD 30 MIN: CPT | Performed by: ORTHOPAEDIC SURGERY

## 2020-06-17 PROCEDURE — 1036F TOBACCO NON-USER: CPT | Performed by: ORTHOPAEDIC SURGERY

## 2020-06-17 PROCEDURE — 20610 DRAIN/INJ JOINT/BURSA W/O US: CPT | Performed by: ORTHOPAEDIC SURGERY

## 2020-06-17 PROCEDURE — G8427 DOCREV CUR MEDS BY ELIG CLIN: HCPCS | Performed by: ORTHOPAEDIC SURGERY

## 2020-06-17 PROCEDURE — G8420 CALC BMI NORM PARAMETERS: HCPCS | Performed by: ORTHOPAEDIC SURGERY

## 2020-06-17 NOTE — PROGRESS NOTES
Chief Complaint   Patient presents with    Shoulder Pain     f/u MRI right shoulder         HPI:    Patient is 53 y.o. female complaining of right shoulder pain and weakness for the past few months. She denies a specific traumatic injury to the right shoulder. Previous treatments include a shoulder arthroscopy about 15 years ago. Recently she has been trying topical pain cream and home exercises from PCP without much relief. She denies any other orthopedic complaints. ROS:    Skin: (-) rash,(-) psoriasis,(-) eczema, (-)skin cancer. Neurologic: (-)numbness, (-)tingling, (-)headaches, (-) LOC. Cardiovascular: (-) Chest pain, (-) swelling in legs/feet, (-) SOB, (-) cramping in legs/feet with walking.     All other review of systems negative except stated above or in HPI      Past Medical History:   Diagnosis Date    Anemia     Anxiety     Arthritis     Asthma     DDD (degenerative disc disease)     Depression     Fibromyalgia     IBS (irritable bowel syndrome)     Migraines     Pain     PTSD (post-traumatic stress disorder)      Past Surgical History:   Procedure Laterality Date    ABDOMEN SURGERY      ABDOMINAL EXPLORATION SURGERY      COLONOSCOPY      HYSTERECTOMY      KNEE SURGERY      cut 2 ligaments right    NERVE BLOCK Bilateral 10 29 13    si inj #1    NERVE BLOCK  11/05/13    sacroiliac joint bilateral #2    OTHER SURGICAL HISTORY      epidurals    OTHER SURGICAL HISTORY  1/10/13    plif L4-5, L5-S1 with cages rods and screws    SHOULDER SURGERY      right    SPINE SURGERY  1/10/13    l4-5 & s1    TONSILLECTOMY         Current Outpatient Medications:     busPIRone (BUSPAR) 30 MG tablet, , Disp: , Rfl: 0    sertraline (ZOLOFT) 100 MG tablet, Take 1 tablet by mouth daily, Disp: , Rfl: 0    tiZANidine (ZANAFLEX) 4 MG tablet, Take 4 mg by mouth every 6 hours as needed, Disp: , Rfl:     polyethylene glycol (GLYCOLAX) powder, Take 17 g by mouth daily, Disp: , Rfl:    Can:  positive   Speed's: positive   Apprehension:  not tested   Cross Arm Sign:  positive   Macon's:  positive      Neer's:  positive      Belly Press Test:  negative   Drop Arm Test:  positive           Imaging:  EXAMINATION:   THREE XRAY VIEWS OF THE RIGHT SHOULDER       4/29/2020 5:10 pm       COMPARISON:   05/02/2017       HISTORY:   ORDERING SYSTEM PROVIDED HISTORY: Biceps tendinitis of right upper extremity   TECHNOLOGIST PROVIDED HISTORY:   Reason for exam:->Biceps tendinitis of right upper extremity       FINDINGS:   Remote deformity of the right distal clavicle with linear dystrophic   calcification along the undersurface of the distal clavicle, likely from   prior acromioclavicular sprain.  Similar to the exam in 2017, the distal   clavicle is slightly superior in position to the acromion likely from remote   acromioclavicular joint sprain.  Subacromial interval is maintained.  The   glenohumeral joint and proximal humerus are intact.           Impression   1. No new abnormality in the right shoulder. 2. Unchanged appearance of the acromioclavicular joint and distal clavicle   likely from remote sprain. Mri Shoulder Right Wo Contrast    Result Date: 6/16/2020  EXAMINATION: MRI OF THE RIGHT SHOULDER WITHOUT CONTRAST   6/16/2020 8:13 am TECHNIQUE: Multiplanar multisequence MRI of the right shoulder was performed without the administration of intravenous contrast. COMPARISON: None. HISTORY: ORDERING SYSTEM PROVIDED HISTORY: Nontraumatic tear of right rotator cuff, unspecified tear extent FINDINGS: ROTATOR CUFF: Rotator cuff is intact. Mild cystic change at the insertion of the infraspinatus tendon at the greater tuberosity. Muscles are normal in bulk and signal. BICEPS TENDON: The longhead biceps tendon is intact. No evidence of tendinosis. The biceps pulley and anchor are intact. LABRUM: The glenoid labrum is intact to the extent that it is visualized.  There is no evidence for a paralabral cyst. GLENOHUMERAL JOINT:  The articular cartilage overlying the glenoid fossa is normal.  There is no glenohumeral joint effusion. There is no loose body or debris present within the glenohumeral joint. AC JOINT AND ACROMIOCLAVICULAR ARCH: Minimal acromioclavicular degenerative changes. BONE MARROW: No acute fracture or suspicious marrow replacing lesion. OUTLET SPACES: The suprascapular notch and quadrilateral space are without obstructing or space occupying lesions. No rotator cuff tear. Mild cystic change at the insertion of the infraspinatus tendon at the greater tuberosity. Minimal acromioclavicular degenerative changes. Gagan Medellin was seen today for shoulder pain. Diagnoses and all orders for this visit:    Nontraumatic tear of right rotator cuff, unspecified tear extent  -     PA ARTHROCENTESIS ASPIR&/INJ MAJOR JT/BURSA W/O US    Other orders  -     triamcinolone acetonide (KENALOG-40) injection 40 mg        Patient seen and examined for new issue of atraumatic insidious onset of right shoulder pain. X-rays reviewed. Patient has exam and history consistent with rotator cuff pathology. She has a complex history of right shoulder arthroscopy done 10 to 15 years ago for the same issue but this time it feels much worse. Patient was educated about rotator cuff issues and MRI recommended for further evaluation and management of possible rotator cuff tear. Patient seen and examined. MRI reviewed with patient in detail. Natural history and course discussed with patient in long discussion  Treatment options discussed with patient in detail including risks and benefits. Patient should do well with conservative management as patient would like to avoid surgery at this time. Patient seen and examined. MRI reviewed with patient in detail.   Natural history and course discussed with patient in long discussion  Treatment options discussed with patient in detail including risks and

## 2020-06-19 RX ORDER — TRIAMCINOLONE ACETONIDE 40 MG/ML
40 INJECTION, SUSPENSION INTRA-ARTICULAR; INTRAMUSCULAR ONCE
Status: COMPLETED | OUTPATIENT
Start: 2020-06-19 | End: 2020-06-19

## 2020-06-19 RX ADMIN — TRIAMCINOLONE ACETONIDE 40 MG: 40 INJECTION, SUSPENSION INTRA-ARTICULAR; INTRAMUSCULAR at 13:33

## 2020-08-17 ENCOUNTER — HOSPITAL ENCOUNTER (OUTPATIENT)
Age: 45
Discharge: HOME | End: 2020-08-19
Payer: COMMERCIAL

## 2020-08-17 ENCOUNTER — OFFICE VISIT (OUTPATIENT)
Dept: PRIMARY CARE CLINIC | Age: 45
End: 2020-08-17
Payer: COMMERCIAL

## 2020-08-17 VITALS
WEIGHT: 140 LBS | TEMPERATURE: 98.5 F | HEART RATE: 68 BPM | BODY MASS INDEX: 24.8 KG/M2 | OXYGEN SATURATION: 99 % | SYSTOLIC BLOOD PRESSURE: 126 MMHG | DIASTOLIC BLOOD PRESSURE: 74 MMHG | HEIGHT: 63 IN

## 2020-08-17 PROCEDURE — G8420 CALC BMI NORM PARAMETERS: HCPCS | Performed by: NURSE PRACTITIONER

## 2020-08-17 PROCEDURE — 99213 OFFICE O/P EST LOW 20 MIN: CPT | Performed by: NURSE PRACTITIONER

## 2020-08-17 PROCEDURE — 1036F TOBACCO NON-USER: CPT | Performed by: NURSE PRACTITIONER

## 2020-08-17 PROCEDURE — G8427 DOCREV CUR MEDS BY ELIG CLIN: HCPCS | Performed by: NURSE PRACTITIONER

## 2020-08-17 PROCEDURE — U0003 INFECTIOUS AGENT DETECTION BY NUCLEIC ACID (DNA OR RNA); SEVERE ACUTE RESPIRATORY SYNDROME CORONAVIRUS 2 (SARS-COV-2) (CORONAVIRUS DISEASE [COVID-19]), AMPLIFIED PROBE TECHNIQUE, MAKING USE OF HIGH THROUGHPUT TECHNOLOGIES AS DESCRIBED BY CMS-2020-01-R: HCPCS

## 2020-08-17 RX ORDER — CETIRIZINE HYDROCHLORIDE 10 MG/1
10 TABLET ORAL DAILY
Qty: 30 TABLET | Refills: 0 | Status: SHIPPED | OUTPATIENT
Start: 2020-08-17 | End: 2020-09-16

## 2020-08-17 RX ORDER — AMOXICILLIN 500 MG/1
500 CAPSULE ORAL 3 TIMES DAILY
Qty: 21 CAPSULE | Refills: 0 | Status: SHIPPED | OUTPATIENT
Start: 2020-08-17 | End: 2020-08-24

## 2020-08-17 NOTE — LETTER
[facility]  [address] [facility]  Phone: [phone]  Fax: [phone]    DIANA Schafer CNP      8/17/2020     Patient: Yoni Sherwood   YOB: 1975       To Whom It May Concern: It is my medical opinion that Yoni Sherwood should remain out of work while acutely ill and awaiting COVID-19 test results. Return to work with no retesting should be followed if test is negative AND meets these 3 criteria as outlined by CDC/ODH:   a. No fever without the use of fever reducers for 24 hours  b. Improvement in symptoms  c. At least 7 days since the onset of symptoms     If tests positive for COVID-19, needs minimum of 10 days strict quarantine, improvement of symptoms and 24 hours fever free without fever reducing medications. If you have any questions or concerns, please don't hesitate to call.     Sincerely,        DIANA Schafer CNP

## 2020-08-17 NOTE — PROGRESS NOTES
Chief Complaint   Otalgia (left  x 5 days); Pharyngitis (left amie eof throat); and Fatigue      History of Present Illness   Source of history provided by:  patient. Gustavo Cabrales is a 50 y.o. old female who has a past medical history of:   Past Medical History:   Diagnosis Date    Anemia     Anxiety     Arthritis     Asthma     DDD (degenerative disc disease)     Depression     Fibromyalgia     IBS (irritable bowel syndrome)     Migraines     Pain     PTSD (post-traumatic stress disorder)    Presents to the flu clinic with complaints of Fatigue, left ear pain and pharyngitis x 5 days. States symptoms have stayed the same since onset. Has been taking Acetaminophen without symptomatic relief. Denies any Fever, Cough, Shortness of breath, Nausea, Vomiting, Chest Pain, Abdominal Pain, Rash or Close contact with a lab confirmed COVID-19 patient within 14 days of symptom onset . Denies any hx of asthma, COPD and emphysema. ROS   Pertinent positives and negatives are stated within HPI, all other systems reviewed and are negative. Surgical History:  has a past surgical history that includes Tonsillectomy; knee surgery; shoulder surgery; Abdominal exploration surgery; other surgical history; other surgical history (1/10/13); Spine surgery (1/10/13); Nerve Block (Bilateral, 10 29 13); Nerve Block (11/05/13); Abdomen surgery; Colonoscopy; and Hysterectomy. Social History:  reports that she has never smoked. She has never used smokeless tobacco. She reports that she does not drink alcohol or use drugs. Family History: family history includes Diabetes in her father; Emphysema in her father; High Blood Pressure in her father. Allergies: Codeine; Hydrocodone;  Oxycodone; and Oxycontin [oxycodone hcl]    Physical Exam      VS:  /74   Pulse 68   Temp 98.5 °F (36.9 °C) (Oral)   Ht 5' 3\" (1.6 m)   Wt 140 lb (63.5 kg)   LMP 03/23/2016   SpO2 99%   BMI 24.80 kg/m²    Oxygen Saturation Interpretation: Normal.    Constitutional:  Alert, development consistent with age. NAD. Head:  NC/NT. Airway patent. Ears: TMs with middle ear effusion on left, otherwise normal and clear on right. Canals without exudate or swelling bilaterally. Mouth: Posterior pharynx with mild erythema and clear postnasal drip. No tonsillar hypertrophy or exudate. Neck:  Normal ROM. Supple. No anterior cervical adenopathy noted. Lungs: CTAB without wheezes, rales, or rhonchi. CV:  Regular rate and rhythm, normal heart sounds, without pathological murmurs, ectopy, gallops, or rubs. Skin:  Normal turgor. Warm, dry, without visible rash. Lymphatic: No lymphangitis or adenopathy noted. Neurological:  Oriented. Motor functions intact. Lab / Imaging Results   (All laboratory and radiology results have been personally reviewed by myself)  Labs:  No results found for this visit on 08/17/20. Imaging: All Radiology results interpreted by Radiologist unless otherwise noted. No results found. Medical Decision Making   Pt non-toxic, in no apparent distress and stable at time of discharge. Assessment/Plan   Yamel Garcia was seen today for otalgia, pharyngitis and fatigue. Diagnoses and all orders for this visit:    Upper respiratory tract infection, unspecified type  -     cetirizine (ZYRTEC) 10 MG tablet; Take 1 tablet by mouth daily  -     amoxicillin (AMOXIL) 500 MG capsule; Take 1 capsule by mouth 3 times daily for 7 days    Suspected COVID-19 virus infection  -     Covid-19 Ambulatory; Future  - Strict quarantine until test results are back  - Advised of current CDC guidelines regarding both positive and negative test results    COVID-19 swab obtained and pending, will call with results once available. Advised strict self-quarantine at home in the interim. Work excuse provided to patient today. Increase fluids and rest. Symptomatic relief discussed including Tylenol prn pain/fever.  Schedule f/u with PCP in 7-10

## 2020-08-18 LAB
SARS-COV-2: NOT DETECTED
SOURCE: NORMAL

## 2020-09-22 ENCOUNTER — OFFICE VISIT (OUTPATIENT)
Dept: ORTHOPEDIC SURGERY | Age: 45
End: 2020-09-22
Payer: COMMERCIAL

## 2020-09-22 VITALS — TEMPERATURE: 96.8 F | WEIGHT: 140 LBS | BODY MASS INDEX: 24.8 KG/M2 | HEIGHT: 63 IN

## 2020-09-22 PROCEDURE — 1036F TOBACCO NON-USER: CPT | Performed by: ORTHOPAEDIC SURGERY

## 2020-09-22 PROCEDURE — G8427 DOCREV CUR MEDS BY ELIG CLIN: HCPCS | Performed by: ORTHOPAEDIC SURGERY

## 2020-09-22 PROCEDURE — 99214 OFFICE O/P EST MOD 30 MIN: CPT | Performed by: ORTHOPAEDIC SURGERY

## 2020-09-22 PROCEDURE — G8420 CALC BMI NORM PARAMETERS: HCPCS | Performed by: ORTHOPAEDIC SURGERY

## 2020-09-22 NOTE — PATIENT INSTRUCTIONS
Patient Education        Rotator Cuff Problems: Care Instructions  Your Care Instructions     The rotator cuff is a group of tendons and muscles around the shoulder that keeps the shoulder joint stable and allows you to raise and rotate your arm. Over time, daily wear and exercise can cause the tendons to rub on the bones of your shoulder. This is called impingement. This condition may cause the tendons to bruise, degenerate, or tear. In many people, these problems do not cause pain. When they do cause pain, you can use rest, physical therapy, ice and heat, and anti-inflammatory medicine to reduce pain and swelling. If you still have pain after trying these treatments, you and your doctor can discuss having a steroid injection or surgery. Follow-up care is a key part of your treatment and safety. Be sure to make and go to all appointments, and call your doctor if you are having problems. It's also a good idea to know your test results and keep a list of the medicines you take. How can you care for yourself at home? · Be safe with medicines. Read and follow all instructions on the label. ? If the doctor gave you a prescription medicine for pain, take it as prescribed. ? If you are not taking a prescription pain medicine, ask your doctor if you can take an over-the-counter medicine. · Put ice or a cold pack on your shoulder for 10 to 20 minutes at a time. Try to do this every 1 to 2 hours for the next 3 days (when you are awake). Put a thin cloth between the ice pack and your skin. · After 3 days, put a warm, wet towel on your shoulder. This is to relax the muscles and increase blood flow. While holding the towel on your shoulder, lean forward so your arm hangs freely, and gently swing your arm back and forth like a pendulum. You also can do this standing under a warm shower. · Follow your doctor's advice for physical therapy. When your doctor says it is okay, try these stretching exercises.  Do them slowly to avoid injury. Put a warm, wet towel on your shoulder before exercising. Stop any exercise that increases pain. ? Range-of-motion exercises. If it is not too painful, stretch your arm in four directions: across the body, up the back, to the side, and overhead. ? Pendulum exercise. Lean forward and hold onto a table or the back of a chair with your good arm. Bend at the waist, letting the arm with the sore shoulder hang straight down. Swing your arm back and forth like a pendulum, then in circles that start small and slowly grow larger. This exercise does not use the arm muscles. Instead, use your legs and your hips to create movement that makes your arm swing freely. Try this for about 5 minutes, several times a day. ? Wall climbing (to the side). Stand with your side to a wall so that your fingers can just touch it. Then turn so your body is turned slightly toward the wall. Walk the fingers of your injured arm up the wall as high as pain permits. Try not to shrug your shoulder up toward your ear as you move your arm up. Hold that position for a count of 15 to 30 seconds. Walk your fingers down to the starting position. Repeat 2 to 4 times, trying to reach higher each time. ? Wall climbing (to the front). Face a wall, standing so your fingers can just touch it. Walk the fingers of your affected arm up the wall as high as pain permits. Try not to shrug your shoulder up toward your ear as you move your arm up. Hold that position for a count of 15 to 30 seconds. Slowly walk your fingers to the starting position. Repeat 2 to 4 times, trying to reach higher each time. · Rest your shoulder when you are not doing stretches and other exercises. Your doctor may tell you to wait for the pain to go away before doing exercises. Do not lift heavy bags of groceries, play sports, or do anything else that makes you twist or stress your shoulder. Avoid activities where you move your affected arm above your head.   When should you call for help? Call your doctor now or seek immediate medical care if:  · You have severe pain. · You cannot move your shoulder or arm. · You have tingling or numbness in your arm or hand. · Your arm or hand is cool or pale. Watch closely for changes in your health, and be sure to contact your doctor if:  · Your pain gets worse. · You have new or worse swelling in your arm or hand. · You do not get better as expected. Where can you learn more? Go to https://XMOSpeCarePartners Plus.GENIAC. org and sign in to your Xconomy account. Enter E207 in the The Kernel box to learn more about \"Rotator Cuff Problems: Care Instructions. \"     If you do not have an account, please click on the \"Sign Up Now\" link. Current as of: March 2, 2020               Content Version: 12.5  © 5803-3630 [employer]. Care instructions adapted under license by [facility] If you have questions about a medical condition or this instruction, always ask your healthcare professional. [address] any warranty or liability for your use of this information. Patient Education        Shoulder Arthroscopy: Before Your Surgery  What is shoulder arthroscopy? Shoulder arthroscopy is a type of surgery. It lets a doctor repair shoulder problems without making a large cut (incision). To do this surgery, the doctor puts a lighted tube through small incisions in your shoulder. The tube is called an arthroscope or scope. Next, the doctor puts some surgical tools in the scope to help make any repairs. The incisions will leave scars that usually fade with time. This type of surgery is used to treat many shoulder problems. Osteoarthritis. This happens when your cartilage breaks down. Cartilage is the hard, thick tissue that cushions the joints. For this problem, the doctor shaves and smooths rough surfaces on the shoulder joint. Loose body. This is a loose piece of bone or cartilage. It's often caused by an injury. The doctor may put the loose piece back in place. Sometimes the piece is removed. Impingement syndrome. This happens when shoulder tissue starts to swell and rub against a bone. This can occur in the tendons of the rotator cuff. Or it may happen in the tendons that connect the bicep to the shoulder. It can also occur in the bursa, the sac between the rotator cuff and the top of the shoulder blade. To fix this problem, your doctor removes the bursa and part of the bone from the point of your shoulder. This increases the space in the shoulder area. In a few weeks, the bursa re-forms. Shoulder arthroscopy is also used for other problems. These include rotator cuff problems, bicep tendon tears, and shoulder instability. This information does not cover these surgeries. Most people go home on the day of the surgery. When you can go back to work or your usual activities depends on your shoulder problem. You will probably need about 6 weeks or longer to recover. Follow-up care is a key part of your treatment and safety. Be sure to make and go to all appointments, and call your doctor if you are having problems. It's also a good idea to know your test results and keep a list of the medicines you take. How do you prepare for surgery? Surgery can be stressful. This information will help you understand what you can expect. And it will help you safely prepare for surgery. Preparing for surgery  · Be sure you have someone to take you home. Anesthesia and pain medicine will make it unsafe for you to drive or get home on your own. · Understand exactly what surgery is planned, along with the risks, benefits, and other options. · If you take aspirin or some other blood thinner, ask your doctor if you should stop taking it before your surgery. Make sure that you understand exactly what your doctor wants you to do. These medicines increase the risk of bleeding.   · Tell your doctor ALL the medicines, vitamins, supplements, and herbal remedies you take. Some may increase the risk of problems during your surgery. Your doctor will tell you if you should stop taking any of them before the surgery and how soon to do it. · Make sure your doctor and the hospital have a copy of your advance directive. If you don't have one, you may want to prepare one. It lets others know your health care wishes. It's a good thing to have before any type of surgery or procedure. What happens on the day of surgery? · Follow the instructions exactly about when to stop eating and drinking. If you don't, your surgery may be canceled. If your doctor told you to take your medicines on the day of surgery, take them with only a sip of water. · Take a bath or shower before you come in for your surgery. Do not apply lotions, perfumes, deodorants, or nail polish. · Do not shave the surgical site yourself. · Take off all jewelry and piercings. And take out contact lenses, if you wear them. At the hospital or surgery center    · Bring a picture ID. · The area for surgery is often marked to make sure there are no errors. · You will be kept comfortable and safe by your anesthesia provider. The anesthesia may make you sleep. Or it may just numb the area being worked on. · The surgery will take about 1 to 2 hours. It depends on what type of shoulder problem you have. When should you call your doctor? · You have questions or concerns. · You don't understand how to prepare for your surgery. · You become ill before the surgery (such as fever, flu, or a cold). · You need to reschedule or have changed your mind about having the surgery. Where can you learn more? Go to https://Sparkplay MediaevieTimetric.LaunchLab. org and sign in to your Fabbeo account. Enter W180 in the Evolv box to learn more about \"Shoulder Arthroscopy: Before Your Surgery. \"     If you do not have an account, please click on the \"Sign Up Now\" link.  Current as of: March 2, 2020               Content Version: 12.5  © 2006-2020 Healthwise, [employer]. Care instructions adapted under license by [facility] ([facility]). If you have questions about a medical condition or this instruction, always ask your healthcare professional. [address] any warranty or liability for your use of this information. Patient Education        Shoulder Arthroscopy: What to Expect at Home  Your Recovery     Arthroscopy is a way to find problems and do surgery inside a joint without making a large cut (incision). Your doctor put a lighted tube with a tiny camera--called an arthroscope, or scope--and surgical tools through small incisions in your shoulder. Your arm may be in a sling. You will feel tired for several days. Your shoulder will be swollen. And you may notice that your skin is a different color near the cuts the doctor made (incisions). Your hand and arm may also be swollen. This is normal and will go away in a few days. Depending on the medicine you had during the surgery, your entire arm may feel numb or like you can't move it. This goes away in 12 to 24 hours. How soon you can go back to work or your usual routine will depend on your shoulder problem. Most people need 6 weeks or longer to recover. How much time you need depends on the surgery that was done. You may have to limit your activity until your shoulder strength and range of motion are back to normal. You may also be in a rehabilitation program (rehab). If you have a desk job, you may be able to go back to work a few days after the surgery. If you lift things at work, it may take months before you can go back. This care sheet gives you a general idea about how long it will take for you to recover. But each person recovers at a different pace. Follow the steps below to get better as quickly as possible. How can you care for yourself at home? Activity  · Rest when you feel tired. Getting enough sleep will help you recover. You may be more comfortable if you sleep in a reclining chair. To make your arm and shoulder feel better, keep a thin pillow under the back of your arm while you are lying down. · Try to walk each day. Start by walking a little more than you did the day before. Bit by bit, increase the amount you walk. Walking boosts blood flow and helps prevent pneumonia and constipation. · For 2 to 3 weeks, avoid lifting anything heavier than a plate or a glass. You need to give your shoulder time to heal.  · Your arm may be in a sling. You may need to use the sling for a few days to a few weeks. Your doctor will advise you on how long to wear the sling. · You may take the sling off when you dress or wash. · Do not use your arm for repeated movements. These include painting, vacuuming, or using a computer. Diet  · You can eat your normal diet. If your stomach is upset, try bland, low-fat foods like plain rice, broiled chicken, toast, and yogurt. · Drink plenty of fluids, unless your doctor tells you not to. · You may notice that your bowel movements are not regular right after your surgery. This is common. Try to avoid constipation and straining with bowel movements. You may want to take a fiber supplement every day. If you have not had a bowel movement after a couple of days, ask your doctor about taking a mild laxative. Medicines  · Your doctor will tell you if and when you can restart your medicines. He or she will also give you instructions about taking any new medicines. · If you take aspirin or some other blood thinner, ask your doctor if and when to start taking it again. Make sure that you understand exactly what your doctor wants you to do. · Take pain medicines exactly as directed. ? If the doctor gave you a prescription medicine for pain, take it as prescribed.   ? If you are not taking a prescription pain medicine, ask your doctor if you can take an over-the-counter medicine. · If you think your pain medicine is making you sick to your stomach:  ? Take your medicine after meals (unless your doctor has told you not to). ? Ask your doctor for a different pain medicine. · If your doctor prescribed antibiotics, take them as directed. Do not stop taking them just because you feel better. You need to take the full course of antibiotics. Incision care  · If you have a dressing over your incision, keep it clean and dry. You may remove it 2 to 3 days after the surgery. · If your incision is open to the air, keep the area clean and dry. · If you have strips of tape on the incision, leave the tape on for a week or until it falls off. Exercise  · You may need rehabilitation. This is a series of exercises you do after your surgery. Rehab helps you get back your shoulder's range of motion and strength. You will work with your doctor and physical therapist to plan this exercise program. To get the best results, you need to do the exercises correctly and as often and as long as your doctor tells you. Ice  · To reduce swelling and pain, put ice or a cold pack on your shoulder for 10 to 20 minutes at a time. Do this every 1 to 2 hours. Put a thin cloth between the ice and your skin. Follow-up care is a key part of your treatment and safety. Be sure to make and go to all appointments, and call your doctor if you are having problems. It's also a good idea to know your test results and keep a list of the medicines you take. When should you call for help? [id number] anytime you think you may need emergency care. For example, call if:  · You passed out (lost consciousness). · You have severe trouble breathing. · You have sudden chest pain and shortness of breath, or you cough up blood. Call your doctor now or seek immediate medical care if:  · Your hand is cool, pale, or numb, or it changes color. · You are unable to move your fingers, wrist, or elbow.   · You are sick to your stomach or cannot keep fluids down. · You have pain that does not get better after you take pain medicine. · You have signs of infection, such as:  ? Increased pain, swelling, warmth, or redness. ? Red streaks leading from the incision. ? Pus draining from the incision. ? A fever. · You have loose stitches, or your incision comes open. · Your incision bleeds through your first dressing or is still bleeding 3 days after your surgery. Watch closely for changes in your health, and be sure to contact your doctor if:  · Your sling feels too tight, and you cannot loosen it. · You have new or increased swelling in your arm. · You have new pain that develops in another area of the affected limb. For example, you have pain in your hand or elbow. · You do not have a bowel movement after taking a laxative. · You do not get better as expected. Where can you learn more? Go to https://Domosite.FeedVisor. org and sign in to your [facility] account. Enter Q455 in the Broadcast.com box to learn more about \"Shoulder Arthroscopy: What to Expect at Home. \"     If you do not have an account, please click on the \"Sign Up Now\" link. Current as of: March 2, 2020               Content Version: 12.5  © 9966-1172 Healthwise, Incorporated. Care instructions adapted under license by [employer]. If you have questions about a medical condition or this instruction, always ask your healthcare professional. [place] [address] any warranty or liability for your use of this information.

## 2020-09-22 NOTE — PROGRESS NOTES
Chief Complaint   Patient presents with    Shoulder Injury     right shoulder f/u seems to be doing worse; things were going good after inj but 3-4 weeks ago pain returned         HPI:    Patient is 50 y.o. female complaining of right shoulder pain and weakness for the past few months. She denies a specific traumatic injury to the right shoulder. Previous treatments include a shoulder arthroscopy about 15 years ago. Recently she has been trying topical pain cream and home exercises from PCP without much relief. She denies any other orthopedic complaints. Follows up after injection 3 months ago. Patient states she received decent relief but started to hurt again it would last 3 to 4 weeks with no trauma. Patient admits to radiation of pain with numbness and tingling to her hand at this point. ROS:    Skin: (-) rash,(-) psoriasis,(-) eczema, (-)skin cancer. Neurologic: (-)numbness, (-)tingling, (-)headaches, (-) LOC. Cardiovascular: (-) Chest pain, (-) swelling in legs/feet, (-) SOB, (-) cramping in legs/feet with walking.     All other review of systems negative except stated above or in HPI      Past Medical History:   Diagnosis Date    Anemia     Anxiety     Arthritis     Asthma     DDD (degenerative disc disease)     Depression     Fibromyalgia     IBS (irritable bowel syndrome)     Migraines     Pain     PTSD (post-traumatic stress disorder)      Past Surgical History:   Procedure Laterality Date    ABDOMEN SURGERY      ABDOMINAL EXPLORATION SURGERY      COLONOSCOPY      HYSTERECTOMY      KNEE SURGERY      cut 2 ligaments right    NERVE BLOCK Bilateral 10 29 13    si inj #1    NERVE BLOCK  11/05/13    sacroiliac joint bilateral #2    OTHER SURGICAL HISTORY      epidurals    OTHER SURGICAL HISTORY  1/10/13    plif L4-5, L5-S1 with cages rods and screws    SHOULDER SURGERY      right    SPINE SURGERY  1/10/13    l4-5 & s1    TONSILLECTOMY         Current Outpatient Medications:    busPIRone (BUSPAR) 30 MG tablet, , Disp: , Rfl: 0    sertraline (ZOLOFT) 100 MG tablet, Take 1 tablet by mouth daily, Disp: , Rfl: 0    tiZANidine (ZANAFLEX) 4 MG tablet, Take 4 mg by mouth every 6 hours as needed, Disp: , Rfl:     polyethylene glycol (GLYCOLAX) powder, Take 17 g by mouth daily, Disp: , Rfl:     amphetamine-dextroamphetamine (ADDERALL) 10 MG tablet, take 1 tablet by mouth every morning, Disp: , Rfl: 0    gabapentin (NEURONTIN) 400 MG capsule, Take 800 mg by mouth.  2 in AM, 2 in Afternoon and 3 in PM, Disp: , Rfl:   Allergies   Allergen Reactions    Codeine Itching    Hydrocodone Itching    Oxycodone Itching    Oxycontin [Oxycodone Hcl] Itching     Social History     Socioeconomic History    Marital status: Single     Spouse name: Not on file    Number of children: Not on file    Years of education: Not on file    Highest education level: Not on file   Occupational History    Not on file   Social Needs    Financial resource strain: Not on file    Food insecurity     Worry: Not on file     Inability: Not on file    Transportation needs     Medical: Not on file     Non-medical: Not on file   Tobacco Use    Smoking status: Never Smoker    Smokeless tobacco: Never Used    Tobacco comment: patient is not a smoker   Substance and Sexual Activity    Alcohol use: No     Alcohol/week: 0.0 standard drinks    Drug use: No    Sexual activity: Never   Lifestyle    Physical activity     Days per week: Not on file     Minutes per session: Not on file    Stress: Not on file   Relationships    Social connections     Talks on phone: Not on file     Gets together: Not on file     Attends Religion service: Not on file     Active member of club or organization: Not on file     Attends meetings of clubs or organizations: Not on file     Relationship status: Not on file    Intimate partner violence     Fear of current or ex partner: Not on file     Emotionally abused: Not on file     Physically abused: Not on file     Forced sexual activity: Not on file   Other Topics Concern    Not on file   Social History Narrative    Not on file     Family History   Problem Relation Age of Onset    Diabetes Father     High Blood Pressure Father     Emphysema Father            Physical Exam:    Temp 96.8 °F (36 °C)   Ht 5' 3\" (1.6 m)   Wt 140 lb (63.5 kg)   LMP 03/23/2016   BMI 24.80 kg/m²     GENERAL: alert, appears stated age, cooperative, no acute distress    HEENT: Head is normocephalic, atraumatic. PERRLA. SKIN: Clean, dry, intact. There is not any cellulitis or cutaneous lesions noted in the upper extremities    PULMONARY: breathing is regular and unlabored, no acute distress    CV: The bilateral upper and lower extremities are warm and well-perfused with brisk capillary refill. 2+ pulses UE and LE bilateral.     PSYCHIATRY: Pleasant mood, appropriate behavior, follows commands    NEURO: Sensation is intact distally with light touch with no alteration. Motor exam of the upper extremities show elbow flexion and extension, wrist flexion and extension, and finger abduction grossly intact 5/5. Upper extremity reflexes are bilaterally symmetrical and within normal limits. LYMPH: No lymphedema present distally in upper or lower extremity. MUSCULOSKELETAL:  Shoulder Exam:  Examination of the right shoulder shows: There is not a deformity. There is not erythema. There is not soft tissue swelling. Deltoid region is  tender to palpation. AC Joint is  tender to palpation. Clavicle is not tender to palpation. Bicipital Groove is  tender to palpation. Pectoralis  is not tender to palpation. Scapula/ trapezius is  tender to palpation.   Left:  ROM Full, Strength: Supraspinatus 5/5, Infraspinatus 5/5, Subscapularis 5/5  Right:  /120/60, Strength: Supraspinatus 4/5, Infraspinatus 5/5, Subscapularis 5/5  Left Shoulder:  Crepitus:  no   Tenderness:  none   Effusion:   none   Impingement: negative infraspinatus tendon at the greater tuberosity. Muscles are normal in bulk and signal. BICEPS TENDON: The longhead biceps tendon is intact. No evidence of tendinosis. The biceps pulley and anchor are intact. LABRUM: The glenoid labrum is intact to the extent that it is visualized. There is no evidence for a paralabral cyst. GLENOHUMERAL JOINT:  The articular cartilage overlying the glenoid fossa is normal.  There is no glenohumeral joint effusion. There is no loose body or debris present within the glenohumeral joint. AC JOINT AND ACROMIOCLAVICULAR ARCH: Minimal acromioclavicular degenerative changes. BONE MARROW: No acute fracture or suspicious marrow replacing lesion. OUTLET SPACES: The suprascapular notch and quadrilateral space are without obstructing or space occupying lesions. No rotator cuff tear. Mild cystic change at the insertion of the infraspinatus tendon at the greater tuberosity. Minimal acromioclavicular degenerative changes. Isamar Quinteros was seen today for shoulder injury. Diagnoses and all orders for this visit:    Nontraumatic tear of right rotator cuff, unspecified tear extent    Cervical radiculopathy  -     Cancel: Ambulatory referral to [address] [place] [address], Physical Medicine and Rehabilitation, [facility]        Patient seen and examined for new issue of atraumatic insidious onset of right shoulder pain. X-rays reviewed. Patient has exam and history consistent with rotator cuff pathology. She has a complex history of right shoulder arthroscopy done 10 to 15 years ago for the same issue but this time it feels much worse. Patient was educated about rotator cuff issues and MRI recommended for further evaluation and management of possible rotator cuff tear. Patient seen and examined. MRI reviewed with patient in detail.   Natural history and course discussed with patient in long discussion  Treatment options discussed with patient in detail including risks and benefits. Patient should do well with conservative management as patient would like to avoid surgery at this time. At this point, patient endorses numbness and tingling with radiation to her hand despite receiving good relief from cortisone. Fortunately the cortisone has worn off and she was educated about the possibility of cervical radiculopathy at this point in addition to rotator cuff tendinitis. Patient will be referred to physical medicine and rehabilitation for evaluation management prior to any surgical intervention in regards to the shoulder. Chloe Mims DO          25 minutes was spent with patient. 50% or greater was spent counseling the patient.

## 2021-01-11 LAB
ALBUMIN SERPL-MCNC: NORMAL G/DL
ALP BLD-CCNC: NORMAL U/L
ALT SERPL-CCNC: NORMAL U/L
ANION GAP SERPL CALCULATED.3IONS-SCNC: NORMAL MMOL/L
AST SERPL-CCNC: NORMAL U/L
BILIRUB SERPL-MCNC: NORMAL MG/DL
BILIRUBIN, URINE: NORMAL
BLOOD, URINE: NORMAL
BUN BLDV-MCNC: NORMAL MG/DL
CALCIUM SERPL-MCNC: NORMAL MG/DL
CHLORIDE BLD-SCNC: NORMAL MMOL/L
CLARITY: NORMAL
CO2: NORMAL
COLOR: NORMAL
CREAT SERPL-MCNC: NORMAL MG/DL
GFR CALCULATED: NORMAL
GLUCOSE BLD-MCNC: NORMAL MG/DL
GLUCOSE URINE: NORMAL
KETONES, URINE: NORMAL
LEUKOCYTE ESTERASE, URINE: NORMAL
NITRITE, URINE: NORMAL
PH UA: NORMAL
POTASSIUM SERPL-SCNC: NORMAL MMOL/L
PROTEIN UA: NORMAL
SEDIMENTATION RATE, ERYTHROCYTE: 2
SODIUM BLD-SCNC: NORMAL MMOL/L
SPECIFIC GRAVITY, URINE: NORMAL
TOTAL PROTEIN: NORMAL
UROBILINOGEN, URINE: NORMAL

## 2021-04-16 VITALS
TEMPERATURE: 96.8 F | OXYGEN SATURATION: 98 % | WEIGHT: 135.5 LBS | SYSTOLIC BLOOD PRESSURE: 138 MMHG | RESPIRATION RATE: 14 BRPM | HEART RATE: 68 BPM | DIASTOLIC BLOOD PRESSURE: 78 MMHG | HEIGHT: 64 IN | BODY MASS INDEX: 23.13 KG/M2

## 2021-04-16 RX ORDER — NAPROXEN 500 MG/1
500 TABLET ORAL EVERY 12 HOURS PRN
COMMUNITY
End: 2021-07-22

## 2021-04-16 RX ORDER — TRAMADOL HYDROCHLORIDE 50 MG/1
50 TABLET ORAL EVERY 6 HOURS PRN
COMMUNITY
End: 2021-05-18

## 2021-05-18 ENCOUNTER — OFFICE VISIT (OUTPATIENT)
Dept: PRIMARY CARE CLINIC | Age: 46
End: 2021-05-18
Payer: COMMERCIAL

## 2021-05-18 VITALS
HEART RATE: 82 BPM | HEIGHT: 63 IN | TEMPERATURE: 97.2 F | SYSTOLIC BLOOD PRESSURE: 112 MMHG | WEIGHT: 135.3 LBS | DIASTOLIC BLOOD PRESSURE: 78 MMHG | BODY MASS INDEX: 23.97 KG/M2 | OXYGEN SATURATION: 99 %

## 2021-05-18 DIAGNOSIS — M77.8 TENDINITIS OF WRIST: ICD-10-CM

## 2021-05-18 DIAGNOSIS — M79.7 FIBROMYALGIA: Chronic | ICD-10-CM

## 2021-05-18 DIAGNOSIS — M51.36 DDD (DEGENERATIVE DISC DISEASE), LUMBAR: Primary | Chronic | ICD-10-CM

## 2021-05-18 DIAGNOSIS — G89.29 CHRONIC RIGHT SHOULDER PAIN: ICD-10-CM

## 2021-05-18 DIAGNOSIS — G43.901 MIGRAINE WITH STATUS MIGRAINOSUS, NOT INTRACTABLE, UNSPECIFIED MIGRAINE TYPE: ICD-10-CM

## 2021-05-18 DIAGNOSIS — M25.511 CHRONIC RIGHT SHOULDER PAIN: ICD-10-CM

## 2021-05-18 DIAGNOSIS — F41.1 GENERALIZED ANXIETY DISORDER: ICD-10-CM

## 2021-05-18 PROBLEM — M54.12 CERVICAL RADICULITIS: Status: ACTIVE | Noted: 2021-05-18

## 2021-05-18 PROBLEM — S49.90XA INJURY OF SHOULDER AND UPPER ARM: Status: ACTIVE | Noted: 2021-05-18

## 2021-05-18 PROBLEM — F41.9 ANXIETY DISORDER, UNSPECIFIED: Status: ACTIVE | Noted: 2018-05-22

## 2021-05-18 PROBLEM — M99.51 INTERVERTEBRAL DISC STENOSIS OF NEURAL CANAL OF CERVICAL REGION: Status: ACTIVE | Noted: 2018-05-08

## 2021-05-18 PROBLEM — M79.601 CHRONIC PAIN OF RIGHT UPPER EXTREMITY: Status: ACTIVE | Noted: 2017-05-01

## 2021-05-18 PROBLEM — M96.1 LUMBAR POSTLAMINECTOMY SYNDROME: Status: ACTIVE | Noted: 2021-05-18

## 2021-05-18 PROCEDURE — G8420 CALC BMI NORM PARAMETERS: HCPCS | Performed by: INTERNAL MEDICINE

## 2021-05-18 PROCEDURE — 1036F TOBACCO NON-USER: CPT | Performed by: INTERNAL MEDICINE

## 2021-05-18 PROCEDURE — G8427 DOCREV CUR MEDS BY ELIG CLIN: HCPCS | Performed by: INTERNAL MEDICINE

## 2021-05-18 PROCEDURE — 99213 OFFICE O/P EST LOW 20 MIN: CPT | Performed by: INTERNAL MEDICINE

## 2021-05-18 RX ORDER — NAPROXEN 500 MG/1
1 TABLET ORAL EVERY 12 HOURS
COMMUNITY
Start: 2020-12-08 | End: 2021-05-18

## 2021-05-18 RX ORDER — SERTRALINE HYDROCHLORIDE 100 MG/1
1 TABLET, FILM COATED ORAL DAILY
COMMUNITY
Start: 2021-05-17 | End: 2021-08-03

## 2021-05-18 RX ORDER — IBUPROFEN 600 MG/1
600 TABLET ORAL 2 TIMES DAILY PRN
Qty: 60 TABLET | Refills: 0
Start: 2021-05-18 | End: 2021-06-27 | Stop reason: ALTCHOICE

## 2021-05-18 RX ORDER — TRAMADOL HYDROCHLORIDE 50 MG/1
1 TABLET ORAL 2 TIMES DAILY
COMMUNITY
Start: 2020-03-11 | End: 2021-07-22

## 2021-05-18 SDOH — ECONOMIC STABILITY: FOOD INSECURITY: WITHIN THE PAST 12 MONTHS, YOU WORRIED THAT YOUR FOOD WOULD RUN OUT BEFORE YOU GOT MONEY TO BUY MORE.: NEVER TRUE

## 2021-05-18 NOTE — PROGRESS NOTES
Chief Complaint   Patient presents with    Chronic Pain     Pt is here as a 4 month F/U with no recent exposure to Covid, and has received both vaccines. Pt. had  no labs on file since January, and has a concern of increase in pain in the Sciatic area. HPI:  Patient is here for follow-up of low back pain and right shoulder pain. Fortunately she had surgery on the right shoulder in [facility].  She feels very well at this point she is going through physical therapy range of motion on the right upper extremity is almost back to normal.    Patient complains of left low back pain that has been bothering her lately over the last few weeks not radiating to the lower extremity. She sits at her desk with her legs crossed all the time but she does not remember lifting anything heavy. She has been taking naproxen and ibuprofen and she was advised to stick with one or the other. She will increase her ibuprofen to 600 mg twice a day as needed. She was worried about her dad being 80years old and starting mild dementia. Past Medical History, Surgical History, and Family History has been reviewed and updated.     Review of Systems:  Constitutional:  No fever, no fatigue, no chills, no headaches, no weight change  Dermatology:  No rash, no mole, no dry or sensitive skin  ENT:  No cough, no sore throat, no sinus pain, no runny nose, no ear pain  Cardiology:  No chest pain, no palpitations, no leg edema, no shortness of breath, no PND  Gastroenterology:  No dysphagia, no abdominal pain, no nausea, no vomiting, no constipation, no diarrhea, no heartburn  Musculoskeletal:  No joint pain, no leg cramps, no back pain, no muscle aches  Respiratory:  No shortness of breath, no orthopnea, no wheezing, no KHALIL, no hemoptysis  Urology:  No blood in the urine, no urinary frequency, no urinary incontinence, no urinary urgency, no nocturia, no dysuria    Vitals:    05/18/21 0854   BP: 112/78   Pulse: 82   Temp: 97.2 °F (36.2 °C)   TempSrc: Temporal   SpO2: 99%   Weight: 135 lb 4.8 oz (61.4 kg)   Height: 5' 3\" (1.6 m)       General:  Patient alert and oriented x 3, NAD, pleasant  HEENT:  Atraumatic, normocephalic, PERRLA, EOMI, clear conjunctiva, TMs clear, nose-clear, throat - no erythema  Neck:  Supple, no goiter, no carotid bruits, no LAD  Lungs:  CTA   Heart:  RRR, no murmurs, gallops or rubs  Abdomen:  Soft/nt/nd, + bowel sounds  Extremities:  No clubbing, cyanosis or edema  Right shoulder: Positive dry and clean incision with intact range of motion in almost all directions  Skin: unremarkable    Cholesterol, Total   Date Value Ref Range Status   03/05/2018 168 0 - 199 mg/dL Final     Triglycerides   Date Value Ref Range Status   03/05/2018 96 0 - 149 mg/dL Final     HDL   Date Value Ref Range Status   03/05/2018 45 >40 mg/dL Final     LDL Calculated   Date Value Ref Range Status   03/05/2018 104 (H) 0 - 99 mg/dL Final     VLDL Cholesterol Calculated   Date Value Ref Range Status   03/05/2018 19 mg/dL Final     Sodium   Date Value Ref Range Status   10/28/2018 139 132 - 146 mmol/L Final     Potassium   Date Value Ref Range Status   10/28/2018 4.7 3.5 - 5.0 mmol/L Final     Chloride   Date Value Ref Range Status   10/28/2018 102 98 - 107 mmol/L Final     CO2   Date Value Ref Range Status   10/28/2018 27 22 - 29 mmol/L Final     BUN   Date Value Ref Range Status   10/28/2018 15 6 - 20 mg/dL Final     CREATININE   Date Value Ref Range Status   10/28/2018 0.9 0.5 - 1.0 mg/dL Final     Glucose   Date Value Ref Range Status   10/28/2018 138 (H) 74 - 109 mg/dL Final     Calcium   Date Value Ref Range Status   10/28/2018 9.6 8.6 - 10.2 mg/dL Final     Total Protein   Date Value Ref Range Status   03/05/2018 7.3 6.4 - 8.3 g/dL Final     Albumin   Date Value Ref Range Status   03/05/2018 4.2 3.5 - 5.2 g/dL Final     Total Bilirubin   Date Value Ref Range Status   03/05/2018 0.3 0.0 - 1.2 mg/dL Final     Alkaline Phosphatase   Date Value Ref Range Status   03/05/2018 98 35 - 104 U/L Final     AST   Date Value Ref Range Status   03/05/2018 21 0 - 31 U/L Final     ALT   Date Value Ref Range Status   03/05/2018 13 0 - 32 U/L Final     GFR Non-   Date Value Ref Range Status   10/28/2018 >60 >=60 mL/min/1.73 Final     Comment:     Chronic Kidney Disease: less than 60 ml/min/1.73 sq.m. Kidney Failure: less than 15 ml/min/1.73 sq.m. Results valid for patients 18 years and older. GFR    Date Value Ref Range Status   10/28/2018 >60  Final        No results found. Assessment/Plan:      Outpatient Encounter Medications as of 5/18/2021   Medication Sig Dispense Refill    sertraline (ZOLOFT) 100 MG tablet Take 1 tablet by mouth daily      traMADol (ULTRAM) 50 MG tablet Take 1 tablet by mouth 2 times daily.  ibuprofen (ADVIL;MOTRIN) 600 MG tablet Take 1 tablet by mouth 2 times daily as needed for Pain 60 tablet 0    naproxen (NAPROSYN) 500 MG tablet Take 500 mg by mouth every 12 hours as needed for Pain      busPIRone (BUSPAR) 30 MG tablet Take 30 mg by mouth 2 times daily   0    tiZANidine (ZANAFLEX) 4 MG tablet Take 4 mg by mouth every 6 hours as needed      polyethylene glycol (GLYCOLAX) powder Take 17 g by mouth daily      amphetamine-dextroamphetamine (ADDERALL) 10 MG tablet take 1 tablet by mouth every morning  0    gabapentin (NEURONTIN) 400 MG capsule Take 400 mg by mouth 3 times daily. 3 CAPSULES TID      [DISCONTINUED] naproxen (NAPROSYN) 500 MG tablet Take 1 tablet by mouth every 12 hours      [DISCONTINUED] traMADol (ULTRAM) 50 MG tablet Take 50 mg by mouth every 6 hours as needed for Pain.  [DISCONTINUED] sertraline (ZOLOFT) 50 MG tablet Take 100 mg by mouth daily   0     No facility-administered encounter medications on file as of 5/18/2021. Liang Manzanares was seen today for chronic pain.     Diagnoses and all orders for this visit:    DDD (degenerative disc disease), lumbar L4-5, L5-S1  -     ibuprofen (ADVIL;MOTRIN) 600 MG tablet; Take 1 tablet by mouth 2 times daily as needed for Pain    Migraine with status migrainosus, not intractable, unspecified migraine type    Generalized anxiety disorder    Chronic right shoulder pain    Fibromyalgia    Tendinitis of wrist         There are no Patient Instructions on file for this visit. On this date 5/18/2021 I have spent 30 minutes reviewing previous notes, test results and face to face with the patient discussing the diagnosis and importance of compliance with the treatment plan as well as documenting on the day of the visit.       Paolo Avelar MD   5/18/21

## 2021-05-24 ENCOUNTER — HOSPITAL ENCOUNTER (OUTPATIENT)
Age: 46
Discharge: HOME | End: 2021-05-26
Payer: COMMERCIAL

## 2021-05-24 ENCOUNTER — TELEPHONE (OUTPATIENT)
Dept: PRIMARY CARE CLINIC | Age: 46
End: 2021-05-24

## 2021-05-24 ENCOUNTER — HOSPITAL ENCOUNTER (OUTPATIENT)
Dept: GENERAL RADIOLOGY | Age: 46
Discharge: HOME | End: 2021-05-26
Payer: COMMERCIAL

## 2021-05-24 DIAGNOSIS — M54.50 ACUTE MIDLINE LOW BACK PAIN WITHOUT SCIATICA: Primary | ICD-10-CM

## 2021-05-24 DIAGNOSIS — M54.50 ACUTE MIDLINE LOW BACK PAIN WITHOUT SCIATICA: ICD-10-CM

## 2021-05-24 PROCEDURE — 72100 X-RAY EXAM L-S SPINE 2/3 VWS: CPT

## 2021-05-30 ENCOUNTER — HOSPITAL ENCOUNTER (EMERGENCY)
Age: 46
Discharge: HOME | End: 2021-05-30
Payer: COMMERCIAL

## 2021-05-30 VITALS
RESPIRATION RATE: 16 BRPM | BODY MASS INDEX: 23.04 KG/M2 | OXYGEN SATURATION: 96 % | HEART RATE: 68 BPM | HEIGHT: 63 IN | WEIGHT: 130 LBS | DIASTOLIC BLOOD PRESSURE: 64 MMHG | SYSTOLIC BLOOD PRESSURE: 123 MMHG | TEMPERATURE: 97 F

## 2021-05-30 DIAGNOSIS — J02.9 ACUTE PHARYNGITIS, UNSPECIFIED ETIOLOGY: ICD-10-CM

## 2021-05-30 DIAGNOSIS — J06.9 UPPER RESPIRATORY TRACT INFECTION, UNSPECIFIED TYPE: Primary | ICD-10-CM

## 2021-05-30 LAB — STREP GRP A PCR: NEGATIVE

## 2021-05-30 PROCEDURE — 99211 OFF/OP EST MAY X REQ PHY/QHP: CPT

## 2021-05-30 PROCEDURE — 87880 STREP A ASSAY W/OPTIC: CPT

## 2021-05-30 RX ORDER — AMOXICILLIN 500 MG/1
500 CAPSULE ORAL 3 TIMES DAILY
Qty: 21 CAPSULE | Refills: 0 | Status: SHIPPED | OUTPATIENT
Start: 2021-05-30 | End: 2021-06-06

## 2021-05-30 RX ORDER — BROMPHENIRAMINE MALEATE, PSEUDOEPHEDRINE HYDROCHLORIDE, AND DEXTROMETHORPHAN HYDROBROMIDE 2; 30; 10 MG/5ML; MG/5ML; MG/5ML
5 SYRUP ORAL 4 TIMES DAILY PRN
Qty: 120 ML | Refills: 0 | Status: SHIPPED | OUTPATIENT
Start: 2021-05-30 | End: 2021-06-04

## 2021-05-30 ASSESSMENT — PAIN DESCRIPTION - ORIENTATION: ORIENTATION: RIGHT

## 2021-05-30 ASSESSMENT — PAIN DESCRIPTION - ONSET
ONSET: ON-GOING
ONSET: ON-GOING

## 2021-05-30 ASSESSMENT — PAIN - FUNCTIONAL ASSESSMENT
PAIN_FUNCTIONAL_ASSESSMENT: 0-10
PAIN_FUNCTIONAL_ASSESSMENT: PREVENTS OR INTERFERES SOME ACTIVE ACTIVITIES AND ADLS
PAIN_FUNCTIONAL_ASSESSMENT: ACTIVITIES ARE NOT PREVENTED

## 2021-05-30 ASSESSMENT — PAIN DESCRIPTION - DESCRIPTORS
DESCRIPTORS: SORE
DESCRIPTORS: SORE

## 2021-05-30 ASSESSMENT — PAIN DESCRIPTION - PROGRESSION
CLINICAL_PROGRESSION: NOT CHANGED
CLINICAL_PROGRESSION: GRADUALLY WORSENING

## 2021-05-30 ASSESSMENT — PAIN SCALES - GENERAL: PAINLEVEL_OUTOF10: 4

## 2021-05-30 ASSESSMENT — PAIN DESCRIPTION - LOCATION
LOCATION: THROAT
LOCATION: EAR;THROAT

## 2021-05-30 ASSESSMENT — PAIN DESCRIPTION - FREQUENCY
FREQUENCY: CONTINUOUS
FREQUENCY: CONTINUOUS

## 2021-05-30 ASSESSMENT — PAIN DESCRIPTION - PAIN TYPE
TYPE: ACUTE PAIN
TYPE: ACUTE PAIN

## 2021-05-30 NOTE — ED PROVIDER NOTES
HPI: Mary Stokes is a 50 y.o. female with a past medical history of  has a past medical history of Anemia, Anxiety, Arthritis, Asthma, Chronic back pain, DDD (degenerative disc disease), Depression, Fibromyalgia, Fibromyalgia, IBS (irritable bowel syndrome), Migraines, Pain, and PTSD (post-traumatic stress disorder). presenting with complaints of a cough with nasal congestion. The patient states that these symptoms began gradually. The history is obtained from the patient. The patient states that he has had some subjective chills at home. Patient does complain of a mild cough associated with it that is nonproductive. Patient denies excessive fatigue or sleeping greater than 18 hours a day. Patient denies exposure to mononucleosis. The patient denies any abdominal pain, left upper quadrant fullness, or early satiety. The patient also denies difficulty breathing, hemoptysis, neck pain/stiffness, or blurry vision. Sx have persisted and are mildly worse which is what prompted the visit today. No known exposure to sick contacts, presents for complaints of a sore throat, right ear pain with nasal congestion which is been present for the past 2 days. Denies any fever, body aches or chills. Denies any chest pain or shortness of breath. Denies any concern for Covid exposure. Denies any known exposure to strep pharyngitis.        ROS:   Pertinent positives and negatives are stated within HPI, all other systems reviewed and are negative.      --------------------------------------------- PAST HISTORY ---------------------------------------------  Past Medical History:  has a past medical history of Anemia, Anxiety, Arthritis, Asthma, Chronic back pain, DDD (degenerative disc disease), Depression, Fibromyalgia, Fibromyalgia, IBS (irritable bowel syndrome), Migraines, Pain, and PTSD (post-traumatic stress disorder).     Past Surgical History:  has a past surgical history that includes Tonsillectomy; knee surgery; shoulder surgery; Abdominal exploration surgery; other surgical history; other surgical history (1/10/13); Spine surgery (1/10/13); Nerve Block (Bilateral, 10 29 13); Nerve Block (11/05/13); Abdomen surgery; Colonoscopy; Hysterectomy; and Appendectomy. Social History:  reports that she has never smoked. She has never used smokeless tobacco. She reports current alcohol use. She reports that she does not use drugs. Family History: family history includes Diabetes in her father; Emphysema in her father; High Blood Pressure in her father. The patients home medications have been reviewed. Allergies: Codeine, Hydrocodone, Oxycodone, and Oxycontin [oxycodone hcl]    ------------------------- NURSING NOTES AND VITALS REVIEWED ---------------------------   The nursing notes within the ED encounter and vital signs as below have been reviewed by myself. /64   Pulse 68   Temp 97 °F (36.1 °C) (Temporal)   Resp 16   Ht 5' 3\" (1.6 m)   Wt 130 lb (59 kg)   LMP 03/23/2016   SpO2 96%   BMI 23.03 kg/m²   Oxygen Saturation Interpretation: Normal    The patients available past medical records and past encounters were reviewed. Physical exam:  Constitutional: Vital signs are reviewed the patient is comfortable. The patient is alert and oriented and conversant. Head: The head is atraumatic and normocephalic. Eyes: No discharge is present from the eyes. The sclera are normal.  ENT: The oropharynx demonstrates a small amount of erythema bilaterally. There is no tonsillar enlargement nor is there any exudate present. No uvular deviation or edema. No tonsillary asymmetry.  Floor of the mouth soft, no trismus, handling secretions. TMs bilaterally demonstrate no evidence of infection. Neck: Normal range of motion is achieved in the neck. There is no JVD present. No meningeal signs are present   Anterior cervical adenopathy is normal.  Respiratory/chest: The chest is nontender.  Breath sounds are normal. There is no respiratory distress.   Cardiovascular: Heart shows a regular rate and rhythm without murmurs clicks or gallops. Abdominal exam: The abdomen is non tender without evidence of peritoneal signs. Specific attention to the left upper quadrant with palpation of the spleen demonstrates no organomegaly or tenderness  Skin: warm and dry, without rash  Neurologic: GCS 15   Psych: Normal Affect  -------------------------------------------------- RESULTS -------------------------------------------------    LABS:  Results for orders placed or performed during the hospital encounter of 05/30/21   Strep Screen Group A Throat    Specimen: Throat   Result Value Ref Range    Strep Grp A PCR Negative Negative       RADIOLOGY:  Interpreted by Radiologist.  No orders to display             ------------------------------ ED COURSE/MEDICAL DECISION MAKING----------------------  Medications - No data to display          Medical Decision Making:      Viral pharyngitis,  upper respiratory infection likely viral in etiology. Not hypoxic, nothing to suggests pneumonia. Well appearing, non toxic, appropriate for outpatient management. Plan is for symptom management and PCP follow up.         This patient's ED course included: a personal history and physicial eaxmination and re-evaluation prior to disposition    This patient has remained hemodynamically stable during their ED course. Counseling: The emergency provider has spoken with the patient and discussed todays results, in addition to providing specific details for the plan of care and counseling regarding the diagnosis and prognosis. Questions are answered at this time and they are agreeable with the plan.       --------------------------------- IMPRESSION AND DISPOSITION ---------------------------------    IMPRESSION  1. Upper respiratory tract infection, unspecified type    2.  Acute pharyngitis, unspecified etiology        DISPOSITION  Disposition: Discharge to home  Patient condition is good             Sanket Yap, DIANA - CNP  05/30/21 9032

## 2021-06-27 ENCOUNTER — APPOINTMENT (OUTPATIENT)
Dept: GENERAL RADIOLOGY | Age: 46
End: 2021-06-27
Payer: COMMERCIAL

## 2021-06-27 ENCOUNTER — HOSPITAL ENCOUNTER (EMERGENCY)
Age: 46
Discharge: HOME | End: 2021-06-27
Attending: EMERGENCY MEDICINE
Payer: COMMERCIAL

## 2021-06-27 VITALS
OXYGEN SATURATION: 99 % | TEMPERATURE: 97 F | HEIGHT: 63 IN | BODY MASS INDEX: 23.04 KG/M2 | RESPIRATION RATE: 18 BRPM | HEART RATE: 74 BPM | SYSTOLIC BLOOD PRESSURE: 92 MMHG | DIASTOLIC BLOOD PRESSURE: 51 MMHG | WEIGHT: 130 LBS
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
## 2021-06-27 PROCEDURE — 73130 X-RAY EXAM OF HAND: CPT

## 2021-06-27 PROCEDURE — 90471 IMMUNIZATION ADMIN: CPT | Performed by: STUDENT IN AN ORGANIZED HEALTH CARE EDUCATION/TRAINING PROGRAM

## 2021-06-27 PROCEDURE — 6360000002 HC RX W HCPCS: Performed by: STUDENT IN AN ORGANIZED HEALTH CARE EDUCATION/TRAINING PROGRAM

## 2021-06-27 PROCEDURE — 73110 X-RAY EXAM OF WRIST: CPT

## 2021-06-27 PROCEDURE — 6370000000 HC RX 637 (ALT 250 FOR IP): Performed by: STUDENT IN AN ORGANIZED HEALTH CARE EDUCATION/TRAINING PROGRAM

## 2021-06-27 PROCEDURE — 99282 EMERGENCY DEPT VISIT SF MDM: CPT

## 2021-06-27 PROCEDURE — 90715 TDAP VACCINE 7 YRS/> IM: CPT | Performed by: STUDENT IN AN ORGANIZED HEALTH CARE EDUCATION/TRAINING PROGRAM

## 2021-06-27 RX ORDER — IBUPROFEN 800 MG/1
800 TABLET ORAL ONCE
Status: COMPLETED | OUTPATIENT
Start: 2021-06-27 | End: 2021-06-27

## 2021-06-27 RX ORDER — AMOXICILLIN AND CLAVULANATE POTASSIUM 875; 125 MG/1; MG/1
1 TABLET, FILM COATED ORAL 2 TIMES DAILY
Qty: 14 TABLET | Refills: 0 | Status: SHIPPED | OUTPATIENT
Start: 2021-06-27 | End: 2021-07-04

## 2021-06-27 RX ORDER — IBUPROFEN 800 MG/1
800 TABLET ORAL 2 TIMES DAILY PRN
Qty: 28 TABLET | Refills: 0 | Status: SHIPPED | OUTPATIENT
Start: 2021-06-27 | End: 2021-07-22

## 2021-06-27 RX ADMIN — TETANUS TOXOID, REDUCED DIPHTHERIA TOXOID AND ACELLULAR PERTUSSIS VACCINE, ADSORBED 0.5 ML: 5; 2.5; 8; 8; 2.5 SUSPENSION INTRAMUSCULAR at 07:52

## 2021-06-27 RX ADMIN — IBUPROFEN 800 MG: 800 TABLET, FILM COATED ORAL at 07:51

## 2021-06-27 ASSESSMENT — PAIN DESCRIPTION - PAIN TYPE: TYPE: ACUTE PAIN

## 2021-06-27 ASSESSMENT — PAIN DESCRIPTION - LOCATION: LOCATION: WRIST

## 2021-06-27 ASSESSMENT — PAIN SCALES - GENERAL: PAINLEVEL_OUTOF10: 8

## 2021-06-27 ASSESSMENT — PAIN DESCRIPTION - ORIENTATION: ORIENTATION: RIGHT

## 2021-06-27 NOTE — ED PROVIDER NOTES
Department of Emergency Medicine   ED  Provider Note  Admit Date/RoomTime: 6/27/2021  7:13 AM  ED Room: 12/12          History of Present Illness:  6/27/21, Time: 7:14 AM EDT  Chief Complaint   Patient presents with    Animal Bite     right wrist, yesterday by family dog-edemabrandi is a 50 y.o. female presenting to the ED for animal bite, beginning yesterday. The complaint has been persistent, moderate in severity, and worsened by nothing. The patient is a 24-year-old female presents the emergency department complaining of a dog bite. Patient symptoms were sudden onset yesterday, has been persistent, moderate severity, nothing makes it better or worse. She not take anything for symptoms prior to arrival.  She states that yesterday she was playing with a tire swing and the dog reached up for the rope at the same time she did and it accidentally bit her right wrist.  The patient did not take anything for symptoms prior to arrival.  States that she thinks her tetanus shot is up-to-date. The dog's vaccines are up to date. Patient states that she has increased swelling and some redness of the wrist today and wanted to get it checked out. She denies any fever, chills, numbness, tingling, decreased range of motion, other injuries, or other acute symptoms or concerns. Review of Systems:   Pertinent positives and negatives are stated within HPI, all other systems reviewed and are negative.        --------------------------------------------- PAST HISTORY ---------------------------------------------  Past Medical History:  has a past medical history of Anemia, Anxiety, Arthritis, Asthma, Chronic back pain, DDD (degenerative disc disease), Depression, Fibromyalgia, Fibromyalgia, IBS (irritable bowel syndrome), Migraines, Pain, and PTSD (post-traumatic stress disorder).     Past Surgical History:  has a past surgical history that includes Tonsillectomy; knee surgery; shoulder surgery; tenderness to palpation. Full range of motion of the wrist and hand. Sensation is intact and equal in the bilateral upper extremities. Radial pulses are +2 and equal bilaterally. Compartments are soft and compressible. Neurologic: GCS 15, no focal deficits, symmetric strength 5/5 in the upper and lower extremities bilaterally  Psychiatric: Normal Affect  -------------------------------------------------- RESULTS -------------------------------------------------  I have personally reviewed all laboratory and imaging results for this patient. Results are listed below. LABS: (Lab results interpreted by me)  No results found for this visit on 06/27/21.,       RADIOLOGY:  Interpreted by Radiologist unless otherwise specified  XR WRIST RIGHT (MIN 3 VIEWS)   Final Result   1. Subcutaneous emphysema seen along the dorsal aspect of the right wrist and   medial aspect of the right wrist secondary to puncture wounds from the dog   bite. 2. There is no fracture or periosteal abnormality. XR HAND RIGHT (MIN 3 VIEWS)   Final Result   Unremarkable right hand      Please see the right wrist report. There is dorsal and medial right wrist   subcutaneous emphysema consistent with puncture wound from the dog bite. There is no soft tissue foreign body. ------------------------- NURSING NOTES AND VITALS REVIEWED ---------------------------   The nursing notes within the ED encounter and vital signs as below have been reviewed by myself  BP (!) 92/51   Pulse 74   Temp 97 °F (36.1 °C) (Infrared)   Resp 18   Ht 5' 3\" (1.6 m)   Wt 130 lb (59 kg)   LMP 03/23/2016   SpO2 99%   BMI 23.03 kg/m²     Oxygen Saturation Interpretation: 99 % on room air. Normal    The patients available past medical records and past encounters were reviewed.         ------------------------------ ED COURSE/MEDICAL DECISION MAKING----------------------  Medications   ibuprofen (ADVIL;MOTRIN) tablet 800 mg (800 mg Oral Given up-to-date on her tetanus shot. .  My findings/plan: Patient sitting the bed resting comfortably in no distress. Heart rate regular, lungs are clear and equal.  Half centimeter to quarter centimeter puncture wound style laceration to the dorsum of the right wrist with no surrounding erythema or swelling or drainage and no bleeding. Mild associated general wrist tenderness on palpation with no particular effusion or swelling and really no limited range of motion passively although actively she does not want to move it. Distally the hand is neurovascular intact and shows no sign of acute bone or joint injury throughout the hand itself. Arms legs otherwise show no signs of acute bony or joint injuries or wounds. [NC]      ED Course User Index  [NC] Jono WadeDO nathaly       Patient is resting comfortably upon reassessment and in no distress. This patient's ED course included: a personal history and physicial examination and re-evaluation prior to disposition    This patient has remained hemodynamically stable during their ED course. Counseling: The emergency provider has spoken with the patient and discussed todays results, in addition to providing specific details for the plan of care and counseling regarding the diagnosis and prognosis. Questions are answered at this time and they are agreeable with the plan.       --------------------------------- IMPRESSION AND DISPOSITION ---------------------------------    IMPRESSION  1. Dog bite, initial encounter        DISPOSITION  Disposition: Discharge to home  Patient condition is stable        NOTE: This report was transcribed using voice recognition software.  Every effort was made to ensure accuracy; however, inadvertent computerized transcription errors may be present       Carolina Jane DO  Resident  06/27/21 [id number]

## 2021-07-21 ENCOUNTER — NURSE TRIAGE (OUTPATIENT)
Dept: OTHER | Facility: CLINIC | Age: 46
End: 2021-07-21

## 2021-07-21 NOTE — TELEPHONE ENCOUNTER
Reason for Disposition   MODERATE weakness (i.e., interferes with work, school, normal activities) and persists > 3 days    Answer Assessment - Initial Assessment Questions  1. DESCRIPTION: \"Describe how you are feeling. \"      Weakness and fatigue since Friday, with dizziness and leg cramps    2. SEVERITY: \"How bad is it? \"  \"Can you stand and walk? \"    - MILD - Feels weak or tired, but does not interfere with work, school or normal activities    - [facility] to stand and walk; weakness interferes with work, school, or normal activities    - SEVERE - Unable to stand or walk      Moderate    3. ONSET:  \"When did the weakness begin? \"      Last Friday    4. CAUSE: \"What do you think is causing the weakness? \"      Unknown, mild diarrhea yesterday    5. MEDICINES: Loren Landers you recently started a new medicine or had a change in the amount of a medicine? \"      Last week had steroid injections in trapezius muscle    6. OTHER SYMPTOMS: \"Do you have any other symptoms? \" (e.g., chest pain, fever, cough, SOB, vomiting, diarrhea, bleeding, other areas of pain)      Diarrhea    7. PREGNANCY: \"Is there any chance you are pregnant? \" \"When was your last menstrual period? \"      No    Protocols used: WEAKNESS (GENERALIZED) AND FATIGUE-ADULT-OH    Received call from Papo Holman at [facility]  with [employer]. Brief description of triage: having generalized weakness and mild dizziness. She did have an episode of diarrhea, but is not sure if she has a stomach illness, or if this could be due to her fibromyalgia. Triage indicates for patient to be seen today or tomorrow    Care advice provided, patient verbalizes understanding; denies any other questions or concerns; instructed to call back for any new or worsening symptoms. Writer provided warm transfer to [employer]  at [facility]  for appointment scheduling. Attention Provider: Thank you for allowing me to participate in the care of your patient.   The patient was connected to triage in response to information provided to the ECC. Please do not respond through this encounter as the response is not directed to a shared pool.

## 2021-07-22 ENCOUNTER — OFFICE VISIT (OUTPATIENT)
Dept: PRIMARY CARE CLINIC | Age: 46
End: 2021-07-22
Payer: COMMERCIAL

## 2021-07-22 VITALS
SYSTOLIC BLOOD PRESSURE: 124 MMHG | WEIGHT: 129.4 LBS | HEART RATE: 80 BPM | OXYGEN SATURATION: 95 % | HEIGHT: 63 IN | TEMPERATURE: 96.8 F | BODY MASS INDEX: 22.93 KG/M2 | DIASTOLIC BLOOD PRESSURE: 80 MMHG

## 2021-07-22 DIAGNOSIS — A08.4 VIRAL GASTROENTERITIS: ICD-10-CM

## 2021-07-22 DIAGNOSIS — R19.7 DIARRHEA OF PRESUMED INFECTIOUS ORIGIN: Primary | ICD-10-CM

## 2021-07-22 LAB
ALBUMIN SERPL-MCNC: 4.7 G/DL (ref 3.5–5.2)
ALP BLD-CCNC: 83 U/L (ref 35–104)
ALT SERPL-CCNC: 7 U/L (ref 0–32)
ANION GAP SERPL CALCULATED.3IONS-SCNC: 4 MMOL/L (ref 7–16)
AST SERPL-CCNC: 18 U/L (ref 0–31)
BACTERIA: ABNORMAL /HPF
BASOPHILS ABSOLUTE: 0.06 E9/L (ref 0–0.2)
BASOPHILS RELATIVE PERCENT: 0.7 % (ref 0–2)
BILIRUB SERPL-MCNC: 0.2 MG/DL (ref 0–1.2)
BILIRUBIN URINE: NEGATIVE
BLOOD, URINE: NEGATIVE
BUN BLDV-MCNC: 11 MG/DL (ref 6–20)
CALCIUM SERPL-MCNC: 9.4 MG/DL (ref 8.6–10.2)
CHLORIDE BLD-SCNC: 102 MMOL/L (ref 98–107)
CLARITY: CLEAR
CO2: 33 MMOL/L (ref 22–29)
COLOR: YELLOW
CREAT SERPL-MCNC: 0.9 MG/DL (ref 0.5–1)
EOSINOPHILS ABSOLUTE: 0.13 E9/L (ref 0.05–0.5)
EOSINOPHILS RELATIVE PERCENT: 1.5 % (ref 0–6)
GFR AFRICAN AMERICAN: >60
GFR NON-AFRICAN AMERICAN: >60 ML/MIN/1.73
GLUCOSE BLD-MCNC: 86 MG/DL (ref 74–99)
GLUCOSE URINE: NEGATIVE MG/DL
HCT VFR BLD CALC: 44.1 % (ref 34–48)
HEMOGLOBIN: 13.6 G/DL (ref 11.5–15.5)
IMMATURE GRANULOCYTES #: 0.03 E9/L
IMMATURE GRANULOCYTES %: 0.3 % (ref 0–5)
KETONES, URINE: NEGATIVE MG/DL
LEUKOCYTE ESTERASE, URINE: ABNORMAL
LYMPHOCYTES ABSOLUTE: 2.13 E9/L (ref 1.5–4)
LYMPHOCYTES RELATIVE PERCENT: 24.8 % (ref 20–42)
MCH RBC QN AUTO: 28.6 PG (ref 26–35)
MCHC RBC AUTO-ENTMCNC: 30.8 % (ref 32–34.5)
MCV RBC AUTO: 92.6 FL (ref 80–99.9)
MONOCYTES ABSOLUTE: 0.5 E9/L (ref 0.1–0.95)
MONOCYTES RELATIVE PERCENT: 5.8 % (ref 2–12)
NEUTROPHILS ABSOLUTE: 5.75 E9/L (ref 1.8–7.3)
NEUTROPHILS RELATIVE PERCENT: 66.9 % (ref 43–80)
NITRITE, URINE: NEGATIVE
PDW BLD-RTO: 12.8 FL (ref 11.5–15)
PH UA: 6 (ref 5–9)
PLATELET # BLD: 229 E9/L (ref 130–450)
PMV BLD AUTO: 11.7 FL (ref 7–12)
POTASSIUM SERPL-SCNC: 4.6 MMOL/L (ref 3.5–5)
PROTEIN UA: NEGATIVE MG/DL
RBC # BLD: 4.76 E12/L (ref 3.5–5.5)
RBC UA: ABNORMAL /HPF (ref 0–2)
SODIUM BLD-SCNC: 139 MMOL/L (ref 132–146)
SPECIFIC GRAVITY UA: 1.02 (ref 1–1.03)
TOTAL PROTEIN: 7.4 G/DL (ref 6.4–8.3)
UROBILINOGEN, URINE: 0.2 E.U./DL
WBC # BLD: 8.6 E9/L (ref 4.5–11.5)
WBC UA: ABNORMAL /HPF (ref 0–5)

## 2021-07-22 PROCEDURE — G8427 DOCREV CUR MEDS BY ELIG CLIN: HCPCS | Performed by: INTERNAL MEDICINE

## 2021-07-22 PROCEDURE — 99213 OFFICE O/P EST LOW 20 MIN: CPT | Performed by: INTERNAL MEDICINE

## 2021-07-22 PROCEDURE — G8420 CALC BMI NORM PARAMETERS: HCPCS | Performed by: INTERNAL MEDICINE

## 2021-07-22 PROCEDURE — 1036F TOBACCO NON-USER: CPT | Performed by: INTERNAL MEDICINE

## 2021-07-22 RX ORDER — CETIRIZINE HYDROCHLORIDE 10 MG/1
TABLET ORAL
COMMUNITY
Start: 2020-08-17 | End: 2021-11-02

## 2021-07-22 NOTE — PROGRESS NOTES
Chief Complaint   Patient presents with    Chills     Pt is here as an acute visit, with no recent exposure to Covid. Pt states she had chills, lethargy, and loss of appetite from Thursday afternoon until Saturday, with no fever and positive for diarrhea x1 day. Pt. called the [facility] and spoke with a nurse who scheduled this appt as urgent. HPI:  Patient is here complaining about an episode that started 4 days ago with chills for a couple of days followed by diarrhea for 1 day and generalized body aches and weakness and fatigue. That was associated with decreased appetite. Yesterday diarrhea resolved and patient started drinking some Pedialyte and she felt better but she is not back to normal according to her. She denied any fevers she denied any abdominal pain or any vomiting she denied being exposed to anybody with Covid symptoms. She has received both of her Covid vaccines. Denies eating outside the house and if any other family member has the symptoms. Past Medical History, Surgical History, and Family History has been reviewed and updated.     Review of Systems:  Constitutional:  No fever, no fatigue, no chills, no headaches, no weight change  Dermatology:  No rash, no mole, no dry or sensitive skin  ENT:  No cough, no sore throat, no sinus pain, no runny nose, no ear pain  Cardiology:  No chest pain, no palpitations, no leg edema, no shortness of breath, no PND  Gastroenterology:  No dysphagia, no abdominal pain, no nausea, no vomiting, no constipation, no diarrhea, no heartburn  Musculoskeletal:  No joint pain, no leg cramps, no back pain, no muscle aches  Respiratory:  No shortness of breath, no orthopnea, no wheezing, no KHALIL, no hemoptysis  Urology:  No blood in the urine, no urinary frequency, no urinary incontinence, no urinary urgency, no nocturia, no dysuria    Vitals:    07/22/21 1403   BP: 124/80   Pulse: 80   Temp: 96.8 °F (36 °C)   TempSrc: Temporal   SpO2: 95%   Weight: 129 lb 6.4 oz (58.7 kg)   Height: 5' 3\" (1.6 m)       General:  Patient alert and oriented x 3, NAD, pleasant  HEENT:  Atraumatic, normocephalic, PERRLA, EOMI, clear conjunctiva, TMs clear, nose-clear, throat - no erythema  Neck:  Supple, no goiter, no carotid bruits, no LAD  Lungs:  CTA   Heart:  RRR, no murmurs, gallops or rubs  Abdomen:  Soft/nt/nd, very mild discomfort on the suprapubic area and the left lower quadrant + bowel sounds  Extremities:  No clubbing, cyanosis or edema  Skin: unremarkable    Cholesterol, Total   Date Value Ref Range Status   03/05/2018 168 0 - 199 mg/dL Final     Triglycerides   Date Value Ref Range Status   03/05/2018 96 0 - 149 mg/dL Final     HDL   Date Value Ref Range Status   03/05/2018 45 >40 mg/dL Final     LDL Calculated   Date Value Ref Range Status   03/05/2018 104 (H) 0 - 99 mg/dL Final     VLDL Cholesterol Calculated   Date Value Ref Range Status   03/05/2018 19 mg/dL Final     Sodium   Date Value Ref Range Status   10/28/2018 139 132 - 146 mmol/L Final     Potassium   Date Value Ref Range Status   10/28/2018 4.7 3.5 - 5.0 mmol/L Final     Chloride   Date Value Ref Range Status   10/28/2018 102 98 - 107 mmol/L Final     CO2   Date Value Ref Range Status   10/28/2018 27 22 - 29 mmol/L Final     BUN   Date Value Ref Range Status   10/28/2018 15 6 - 20 mg/dL Final     CREATININE   Date Value Ref Range Status   10/28/2018 0.9 0.5 - 1.0 mg/dL Final     Glucose   Date Value Ref Range Status   10/28/2018 138 (H) 74 - 109 mg/dL Final     Calcium   Date Value Ref Range Status   10/28/2018 9.6 8.6 - 10.2 mg/dL Final     Total Protein   Date Value Ref Range Status   03/05/2018 7.3 6.4 - 8.3 g/dL Final     Albumin   Date Value Ref Range Status   03/05/2018 4.2 3.5 - 5.2 g/dL Final     Total Bilirubin   Date Value Ref Range Status   03/05/2018 0.3 0.0 - 1.2 mg/dL Final     Alkaline Phosphatase   Date Value Ref Range Status   03/05/2018 98 35 - 104 U/L Final     AST   Date Value Ref Range Status   03/05/2018 21 0 - 31 U/L Final     ALT   Date Value Ref Range Status   03/05/2018 13 0 - 32 U/L Final     GFR Non-   Date Value Ref Range Status   10/28/2018 >60 >=60 mL/min/1.73 Final     Comment:     Chronic Kidney Disease: less than 60 ml/min/1.73 sq.m. Kidney Failure: less than 15 ml/min/1.73 sq.m. Results valid for patients 18 years and older. GFR    Date Value Ref Range Status   10/28/2018 >60  Final        XR WRIST RIGHT (MIN 3 VIEWS)    Result Date: 6/27/2021  EXAMINATION: XRAY VIEWS OF THE RIGHT WRIST 6/27/2021 7:29 am COMPARISON: None. HISTORY: ORDERING SYSTEM PROVIDED HISTORY: dog bite TECHNOLOGIST PROVIDED HISTORY: Reason for exam:->dog bite FINDINGS: There is no appreciable fracture of the right wrist.  No periosteal reaction is noted. There is medial and dorsal soft tissue emphysema consistent with puncture wound from the dog bite. There is no soft tissue foreign body. 1. Subcutaneous emphysema seen along the dorsal aspect of the right wrist and medial aspect of the right wrist secondary to puncture wounds from the dog bite. 2. There is no fracture or periosteal abnormality. XR HAND RIGHT (MIN 3 VIEWS)    Result Date: 6/27/2021  EXAMINATION: THREE XRAY VIEWS OF THE RIGHT HAND 6/27/2021 7:29 am COMPARISON: None. HISTORY: ORDERING SYSTEM PROVIDED HISTORY: dog bite TECHNOLOGIST PROVIDED HISTORY: Reason for exam:->dog bite FINDINGS: There is no fracture or dislocation of the right hand. The joint spaces are well maintained. The soft tissues are unremarkable. Unremarkable right hand Please see the right wrist report. There is dorsal and medial right wrist subcutaneous emphysema consistent with puncture wound from the dog bite. There is no soft tissue foreign body.         Assessment/Plan:      Outpatient Encounter Medications as of 7/22/2021   Medication Sig Dispense Refill    cetirizine (ZYRTEC) 10 MG tablet Take by mouth PRN      sertraline (ZOLOFT) 100 MG tablet Take 1 tablet by mouth daily      busPIRone (BUSPAR) 30 MG tablet Take 30 mg by mouth 2 times daily   0    tiZANidine (ZANAFLEX) 4 MG tablet Take 4 mg by mouth every 6 hours as needed      amphetamine-dextroamphetamine (ADDERALL) 10 MG tablet take 1 tablet by mouth every morning  0    gabapentin (NEURONTIN) 400 MG capsule Take 400 mg by mouth 3 times daily. 3 CAPSULES TID      [DISCONTINUED] ibuprofen (ADVIL;MOTRIN) 800 MG tablet Take 1 tablet by mouth 2 times daily as needed for Pain 28 tablet 0    [DISCONTINUED] traMADol (ULTRAM) 50 MG tablet Take 1 tablet by mouth 2 times daily. (Patient not taking: Reported on 7/22/2021)      [DISCONTINUED] naproxen (NAPROSYN) 500 MG tablet Take 500 mg by mouth every 12 hours as needed for Pain      [DISCONTINUED] polyethylene glycol (GLYCOLAX) powder Take 17 g by mouth daily       No facility-administered encounter medications on file as of 7/22/2021. Annie Ace was seen today for chills. Diagnoses and all orders for this visit:    Diarrhea of presumed infectious origin  -     Comprehensive Metabolic Panel; Future  -     Urinalysis; Future  -     CBC Auto Differential; Future    Viral gastroenteritis         There are no Patient Instructions on file for this visit. On this date 7/22/2021 I have spent 25 minutes reviewing previous notes, test results and face to face with the patient discussing the diagnosis and importance of compliance with the treatment plan as well as documenting on the day of the visit.       Jamin Hanna MD   7/22/21

## 2021-08-03 ENCOUNTER — OFFICE VISIT (OUTPATIENT)
Dept: PRIMARY CARE CLINIC | Age: 46
End: 2021-08-03
Payer: COMMERCIAL

## 2021-08-03 VITALS
WEIGHT: 131.1 LBS | DIASTOLIC BLOOD PRESSURE: 70 MMHG | HEIGHT: 63 IN | OXYGEN SATURATION: 98 % | BODY MASS INDEX: 23.23 KG/M2 | TEMPERATURE: 97.4 F | SYSTOLIC BLOOD PRESSURE: 120 MMHG | HEART RATE: 80 BPM

## 2021-08-03 DIAGNOSIS — Z79.899 POLYPHARMACY: ICD-10-CM

## 2021-08-03 DIAGNOSIS — R53.1 GENERALIZED WEAKNESS: Primary | ICD-10-CM

## 2021-08-03 DIAGNOSIS — F41.9 ANXIETY DISORDER, UNSPECIFIED TYPE: ICD-10-CM

## 2021-08-03 PROCEDURE — 1036F TOBACCO NON-USER: CPT | Performed by: INTERNAL MEDICINE

## 2021-08-03 PROCEDURE — 99214 OFFICE O/P EST MOD 30 MIN: CPT | Performed by: INTERNAL MEDICINE

## 2021-08-03 PROCEDURE — G8427 DOCREV CUR MEDS BY ELIG CLIN: HCPCS | Performed by: INTERNAL MEDICINE

## 2021-08-03 PROCEDURE — G8420 CALC BMI NORM PARAMETERS: HCPCS | Performed by: INTERNAL MEDICINE

## 2021-08-03 RX ORDER — TRAMADOL HYDROCHLORIDE 50 MG/1
TABLET ORAL
COMMUNITY
Start: 2021-07-26

## 2021-08-03 RX ORDER — SERTRALINE HYDROCHLORIDE 100 MG/1
TABLET, FILM COATED ORAL
COMMUNITY
Start: 2021-03-04 | End: 2021-10-19

## 2021-08-03 RX ORDER — BUSPIRONE HYDROCHLORIDE 15 MG/1
TABLET ORAL
COMMUNITY
Start: 2021-07-24 | End: 2021-08-03

## 2021-08-03 NOTE — PROGRESS NOTES
Chief Complaint   Patient presents with   [employer]     Pt is here as a 1 month F/U, with no recent exposure to Covid. Labs drawn on 7/22 and these are on file for review. HPI:  Patient is here for follow-up of abdominal cramping nausea diarrhea and generalized body aches. Symptoms have completely resolved except for lightheadedness tiredness fatigue generalized weakness that she is complaining about. Blood work was discussed with patient all appears within normal limits including CBC and CMP. Orthostatic blood pressures were within normal limits and negative for orthostasis. And she indicates that her tiredness and fatigue has been to the point that she would go out for a walk with her dog and cannot return back home she had to call her son to bring her home   Patient complains of fatigue tiredness and lightheadedness and on reviewing her medications she is on very high doses of CNS suppressants including BuSpar, gabapentin, very high dose of tizanidine 24 mg nightly, Adderall, tramadol, and she is using all at the same time. She mentioned that her BuSpar was increased lately to 30 mg twice a day and her tizanidine was also increased to 24 mg nightly recently and she was counseled that this might be the reason for her tiredness fatigue and generalized weakness and lightheadedness and she needs to start weaning off with her psychiatrist and with her pain management. .    Past Medical History, Surgical History, and Family History has been reviewed and updated.     Review of Systems:  Constitutional:  No fever, no fatigue, no chills, no headaches, no weight change  Dermatology:  No rash, no mole, no dry or sensitive skin  ENT:  No cough, no sore throat, no sinus pain, no runny nose, no ear pain  Cardiology:  No chest pain, no palpitations, no leg edema, no shortness of breath, no PND  Gastroenterology:  No dysphagia, no abdominal pain, no nausea, no vomiting, no constipation, no diarrhea, no heartburn  Musculoskeletal:  No joint pain, no leg cramps, no back pain, no muscle aches  Respiratory:  No shortness of breath, no orthopnea, no wheezing, no KHALIL, no hemoptysis  Urology:  No blood in the urine, no urinary frequency, no urinary incontinence, no urinary urgency, no nocturia, no dysuria    Vitals:    08/03/21 1021   BP: 120/70   Pulse: 80   Temp: 97.4 °F (36.3 °C)   TempSrc: Temporal   SpO2: 98%   Weight: 131 lb 1.6 oz (59.5 kg)   Height: 5' 3\" (1.6 m)       General:  Patient alert and oriented x 3, NAD, pleasant  HEENT:  Atraumatic, normocephalic, PERRLA, EOMI, clear conjunctiva, TMs clear, nose-clear, throat - no erythema  Neck:  Supple, no goiter, no carotid bruits, no LAD  Lungs:  CTA   Heart:  RRR, no murmurs, gallops or rubs  Abdomen:  Soft/nt/nd, + bowel sounds  Extremities:  No clubbing, cyanosis or edema  Skin: unremarkable    Cholesterol, Total   Date Value Ref Range Status   03/05/2018 168 0 - 199 mg/dL Final     Triglycerides   Date Value Ref Range Status   03/05/2018 96 0 - 149 mg/dL Final     HDL   Date Value Ref Range Status   03/05/2018 45 >40 mg/dL Final     LDL Calculated   Date Value Ref Range Status   03/05/2018 104 (H) 0 - 99 mg/dL Final     VLDL Cholesterol Calculated   Date Value Ref Range Status   03/05/2018 19 mg/dL Final     Sodium   Date Value Ref Range Status   07/22/2021 139 132 - 146 mmol/L Final     Potassium   Date Value Ref Range Status   07/22/2021 4.6 3.5 - 5.0 mmol/L Final     Chloride   Date Value Ref Range Status   07/22/2021 102 98 - 107 mmol/L Final     CO2   Date Value Ref Range Status   07/22/2021 33 (H) 22 - 29 mmol/L Final     BUN   Date Value Ref Range Status   07/22/2021 11 6 - 20 mg/dL Final     CREATININE   Date Value Ref Range Status   07/22/2021 0.9 0.5 - 1.0 mg/dL Final     Glucose   Date Value Ref Range Status   07/22/2021 86 74 - 99 mg/dL Final     Calcium   Date Value Ref Range Status   07/22/2021 9.4 8.6 - 10.2 mg/dL Final     Total Protein   Date Value Ref Range Status   07/22/2021 7.4 6.4 - 8.3 g/dL Final     Albumin   Date Value Ref Range Status   07/22/2021 4.7 3.5 - 5.2 g/dL Final     Total Bilirubin   Date Value Ref Range Status   07/22/2021 0.2 0.0 - 1.2 mg/dL Final     Alkaline Phosphatase   Date Value Ref Range Status   07/22/2021 83 35 - 104 U/L Final     AST   Date Value Ref Range Status   07/22/2021 18 0 - 31 U/L Final     ALT   Date Value Ref Range Status   07/22/2021 7 0 - 32 U/L Final     GFR Non-   Date Value Ref Range Status   07/22/2021 >60 >=60 mL/min/1.73 Final     Comment:     Chronic Kidney Disease: less than 60 ml/min/1.73 sq.m. Kidney Failure: less than 15 ml/min/1.73 sq.m. Results valid for patients 18 years and older. GFR    Date Value Ref Range Status   07/22/2021 >60  Final        No results found. Assessment/Plan:      Outpatient Encounter Medications as of 8/3/2021   Medication Sig Dispense Refill    traMADol (ULTRAM) 50 MG tablet take 1 tablet by mouth twice a day if needed      sertraline (ZOLOFT) 100 MG tablet Take by mouth      cetirizine (ZYRTEC) 10 MG tablet Take by mouth PRN      busPIRone (BUSPAR) 30 MG tablet Take 30 mg by mouth 2 times daily   0    tiZANidine (ZANAFLEX) 4 MG tablet Take 4 mg by mouth every 6 hours as needed      amphetamine-dextroamphetamine (ADDERALL) 10 MG tablet take 1 tablet by mouth every morning  0    gabapentin (NEURONTIN) 400 MG capsule Take 400 mg by mouth 3 times daily. 3 CAPSULES TID      [DISCONTINUED] busPIRone (BUSPAR) 15 MG tablet take 1 tablet by mouth three times a day      [DISCONTINUED] sertraline (ZOLOFT) 100 MG tablet Take 1 tablet by mouth daily       No facility-administered encounter medications on file as of 8/3/2021. Maria Isabel Denise was seen today for discuss labs.     Diagnoses and all orders for this visit:    Generalized weakness    Polypharmacy    Anxiety disorder, unspecified type         There are no Patient Instructions on file for this visit. On this date 8/3/2021 I have spent 30 minutes reviewing previous notes, multiple medications, test results and face to face with the patient discussing the diagnosis and importance of compliance with the treatment plan as well as documenting on the day of the visit.       Jenny Hughes MD   8/3/21

## 2021-09-16 ENCOUNTER — TELEPHONE (OUTPATIENT)
Dept: PRIMARY CARE CLINIC | Age: 46
End: 2021-09-16

## 2021-09-16 DIAGNOSIS — R05.9 COUGH: Primary | ICD-10-CM

## 2021-09-20 ENCOUNTER — TELEPHONE (OUTPATIENT)
Dept: PRIMARY CARE CLINIC | Age: 46
End: 2021-09-20

## 2021-09-20 NOTE — TELEPHONE ENCOUNTER
Patient notified of test results per instruction of Dr. Temi Padron. Patient verbalized understanding and stated she was feeling better.

## 2021-09-20 NOTE — TELEPHONE ENCOUNTER
----- Message from Giovany Calle sent at 9/20/2021  2:44 PM EDT -----  Subject: Message to Provider    QUESTIONS  Information for Provider? Pt had a COVID test on 9/16/2021. & advised she   would hear something back today.   ---------------------------------------------------------------------------  --------------  CALL BACK INFO  What is the best way for the office to contact you? OK to leave message on   voicemail  Preferred Call Back Phone Number? [phone]  ---------------------------------------------------------------------------  --------------  SCRIPT ANSWERS  Relationship to Patient?  Self

## 2021-10-19 ENCOUNTER — OFFICE VISIT (OUTPATIENT)
Dept: PRIMARY CARE CLINIC | Age: 46
End: 2021-10-19
Payer: COMMERCIAL

## 2021-10-19 VITALS
HEART RATE: 68 BPM | DIASTOLIC BLOOD PRESSURE: 64 MMHG | OXYGEN SATURATION: 98 % | SYSTOLIC BLOOD PRESSURE: 120 MMHG | BODY MASS INDEX: 23.35 KG/M2 | HEIGHT: 63 IN | WEIGHT: 131.8 LBS | TEMPERATURE: 98.5 F

## 2021-10-19 DIAGNOSIS — F32.A DEPRESSIVE DISORDER: ICD-10-CM

## 2021-10-19 DIAGNOSIS — S49.91XA INJURY OF RIGHT SHOULDER AND UPPER ARM, INITIAL ENCOUNTER: Primary | ICD-10-CM

## 2021-10-19 DIAGNOSIS — F41.9 ANXIETY DISORDER, UNSPECIFIED TYPE: ICD-10-CM

## 2021-10-19 PROCEDURE — G8420 CALC BMI NORM PARAMETERS: HCPCS | Performed by: INTERNAL MEDICINE

## 2021-10-19 PROCEDURE — 99214 OFFICE O/P EST MOD 30 MIN: CPT | Performed by: INTERNAL MEDICINE

## 2021-10-19 PROCEDURE — G8427 DOCREV CUR MEDS BY ELIG CLIN: HCPCS | Performed by: INTERNAL MEDICINE

## 2021-10-19 PROCEDURE — G8484 FLU IMMUNIZE NO ADMIN: HCPCS | Performed by: INTERNAL MEDICINE

## 2021-10-19 PROCEDURE — 1036F TOBACCO NON-USER: CPT | Performed by: INTERNAL MEDICINE

## 2021-10-19 RX ORDER — HYDROXYZINE HYDROCHLORIDE 25 MG/1
TABLET, FILM COATED ORAL
COMMUNITY
Start: 2021-10-04 | End: 2021-11-02

## 2021-10-19 RX ORDER — MELOXICAM 15 MG/1
15 TABLET ORAL DAILY PRN
Qty: 30 TABLET | Refills: 0 | Status: SHIPPED
Start: 2021-10-19 | End: 2021-11-02

## 2021-10-19 RX ORDER — BUSPIRONE HYDROCHLORIDE 10 MG/1
10 TABLET ORAL 2 TIMES DAILY
Qty: 30 TABLET | Refills: 0 | Status: SHIPPED | OUTPATIENT
Start: 2021-10-19 | End: 2021-11-18

## 2021-10-19 RX ORDER — DULOXETIN HYDROCHLORIDE 60 MG/1
60 CAPSULE, DELAYED RELEASE ORAL 2 TIMES DAILY
COMMUNITY
Start: 2021-10-15

## 2021-10-19 RX ORDER — METHYLPREDNISOLONE 4 MG/1
TABLET ORAL
Qty: 1 KIT | Refills: 0 | Status: SHIPPED | OUTPATIENT
Start: 2021-10-19 | End: 2021-10-25
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
## 2021-11-02 ENCOUNTER — OFFICE VISIT (OUTPATIENT)
Dept: PRIMARY CARE CLINIC | Age: 46
End: 2021-11-02
Payer: COMMERCIAL

## 2021-11-02 VITALS
HEIGHT: 63 IN | HEART RATE: 79 BPM | TEMPERATURE: 97.2 F | SYSTOLIC BLOOD PRESSURE: 128 MMHG | WEIGHT: 132.9 LBS | BODY MASS INDEX: 23.55 KG/M2 | OXYGEN SATURATION: 99 % | DIASTOLIC BLOOD PRESSURE: 66 MMHG

## 2021-11-02 DIAGNOSIS — F41.1 GENERALIZED ANXIETY DISORDER: ICD-10-CM

## 2021-11-02 DIAGNOSIS — S49.91XD INJURY OF RIGHT SHOULDER AND UPPER ARM, SUBSEQUENT ENCOUNTER: Primary | ICD-10-CM

## 2021-11-02 DIAGNOSIS — F32.A DEPRESSIVE DISORDER: ICD-10-CM

## 2021-11-02 PROCEDURE — 1036F TOBACCO NON-USER: CPT | Performed by: INTERNAL MEDICINE

## 2021-11-02 PROCEDURE — G8484 FLU IMMUNIZE NO ADMIN: HCPCS | Performed by: INTERNAL MEDICINE

## 2021-11-02 PROCEDURE — G8427 DOCREV CUR MEDS BY ELIG CLIN: HCPCS | Performed by: INTERNAL MEDICINE

## 2021-11-02 PROCEDURE — G8420 CALC BMI NORM PARAMETERS: HCPCS | Performed by: INTERNAL MEDICINE

## 2021-11-02 PROCEDURE — 99213 OFFICE O/P EST LOW 20 MIN: CPT | Performed by: INTERNAL MEDICINE

## 2021-11-02 RX ORDER — MELOXICAM 15 MG/1
15 TABLET ORAL DAILY PRN
Qty: 90 TABLET | Refills: 1
Start: 2021-11-02 | End: 2022-01-19

## 2021-11-02 NOTE — PROGRESS NOTES
Chief Complaint   Patient presents with    Shoulder Pain     Pt is here as a 2 week F/U, with no recent known exposure to Covid. Pt states her right shoulder pain is better at this visit. KP       HPI:  Patient is here for follow-up of right shoulder pain. She feels a lot better about 50% improved according to her. She has finished the steroid pack and she started the meloxicam as needed and she has not been taking it on a regular basis. She mentioned that as soon as she feels better she starts using her arm a lot more and that creates the pain once more. And she is advised to rest the shoulder for few weeks. She is also advised to use Tylenol extra strength as needed alternating with meloxicam..  She also feels a lot better on BuSpar 10 mg twice a day and she is actually using it once a day and she feels a lot better with anxiety she is going to discuss it with her counselor next week to added to her medication list.    Past Medical History, Surgical History, and Family History has been reviewed and updated.     Review of Systems:  Constitutional:  No fever, no fatigue, no chills, no headaches, no weight change  Dermatology:  No rash, no mole, no dry or sensitive skin  ENT:  No cough, no sore throat, no sinus pain, no runny nose, no ear pain  Cardiology:  No chest pain, no palpitations, no leg edema, no shortness of breath, no PND  Gastroenterology:  No dysphagia, no abdominal pain, no nausea, no vomiting, no constipation, no diarrhea, no heartburn  Musculoskeletal:  No joint pain, no leg cramps, no back pain, no muscle aches  Respiratory:  No shortness of breath, no orthopnea, no wheezing, no KHALIL, no hemoptysis  Urology:  No blood in the urine, no urinary frequency, no urinary incontinence, no urinary urgency, no nocturia, no dysuria    Vitals:    11/02/21 1052   BP: 128/66   Pulse: 79   Temp: 97.2 °F (36.2 °C)   TempSrc: Temporal   SpO2: 99%   Weight: 132 lb 14.4 oz (60.3 kg)   Height: 5' 3\" (1.6 m) General:  Patient alert and oriented x 3, NAD, pleasant  HEENT:  Atraumatic, normocephalic, PERRLA, EOMI, clear conjunctiva, TMs clear, nose-clear, throat - no erythema  Neck:  Supple, no goiter, no carotid bruits, no LAD  Lungs:  CTA   Heart:  RRR, no murmurs, gallops or rubs  Abdomen:  Soft/nt/nd, + bowel sounds  Extremities:  No clubbing, cyanosis or edema  Right shoulder: Mild tenderness on the pectoralis major insertion muscle otherwise intact range of motion in all directions of the right shoulder and intact handgrip  Skin: unremarkable    Cholesterol, Total   Date Value Ref Range Status   03/05/2018 168 0 - 199 mg/dL Final     Triglycerides   Date Value Ref Range Status   03/05/2018 96 0 - 149 mg/dL Final     HDL   Date Value Ref Range Status   03/05/2018 45 >40 mg/dL Final     LDL Calculated   Date Value Ref Range Status   03/05/2018 104 (H) 0 - 99 mg/dL Final     VLDL Cholesterol Calculated   Date Value Ref Range Status   03/05/2018 19 mg/dL Final     Sodium   Date Value Ref Range Status   07/22/2021 139 132 - 146 mmol/L Final     Potassium   Date Value Ref Range Status   07/22/2021 4.6 3.5 - 5.0 mmol/L Final     Chloride   Date Value Ref Range Status   07/22/2021 102 98 - 107 mmol/L Final     CO2   Date Value Ref Range Status   07/22/2021 33 (H) 22 - 29 mmol/L Final     BUN   Date Value Ref Range Status   07/22/2021 11 6 - 20 mg/dL Final     CREATININE   Date Value Ref Range Status   07/22/2021 0.9 0.5 - 1.0 mg/dL Final     Glucose   Date Value Ref Range Status   07/22/2021 86 74 - 99 mg/dL Final     Calcium   Date Value Ref Range Status   07/22/2021 9.4 8.6 - 10.2 mg/dL Final     Total Protein   Date Value Ref Range Status   07/22/2021 7.4 6.4 - 8.3 g/dL Final     Albumin   Date Value Ref Range Status   07/22/2021 4.7 3.5 - 5.2 g/dL Final     Total Bilirubin   Date Value Ref Range Status   07/22/2021 0.2 0.0 - 1.2 mg/dL Final     Alkaline Phosphatase   Date Value Ref Range Status   07/22/2021 83 35 - 104 U/L Final     AST   Date Value Ref Range Status   07/22/2021 18 0 - 31 U/L Final     ALT   Date Value Ref Range Status   07/22/2021 7 0 - 32 U/L Final     GFR Non-   Date Value Ref Range Status   07/22/2021 >60 >=60 mL/min/1.73 Final     Comment:     Chronic Kidney Disease: less than 60 ml/min/1.73 sq.m. Kidney Failure: less than 15 ml/min/1.73 sq.m. Results valid for patients 18 years and older. GFR    Date Value Ref Range Status   07/22/2021 >60  Final        No results found. Assessment/Plan:      Outpatient Encounter Medications as of 11/2/2021   Medication Sig Dispense Refill    meloxicam (MOBIC) 15 MG tablet Take 1 tablet by mouth daily as needed for Pain 90 tablet 1    DULoxetine (CYMBALTA) 60 MG extended release capsule take 1 capsule by mouth once daily      busPIRone (BUSPAR) 10 MG tablet Take 1 tablet by mouth 2 times daily 30 tablet 0    traMADol (ULTRAM) 50 MG tablet take 1 tablet by mouth twice a day if needed      tiZANidine (ZANAFLEX) 4 MG tablet Take 4 mg by mouth every 6 hours as needed      gabapentin (NEURONTIN) 400 MG capsule Take 1,200 mg by mouth 3 times daily.  [DISCONTINUED] hydrOXYzine (ATARAX) 25 MG tablet take 1 tablet by mouth twice a day if needed      [DISCONTINUED] meloxicam (MOBIC) 15 MG tablet Take 1 tablet by mouth daily as needed for Pain 30 tablet 0    [DISCONTINUED] cetirizine (ZYRTEC) 10 MG tablet Take by mouth PRN       No facility-administered encounter medications on file as of 11/2/2021. José Ogden was seen today for shoulder pain. Diagnoses and all orders for this visit:    Injury of right shoulder and upper arm, subsequent encounter    Depressive disorder    Generalized anxiety disorder    Other orders  -     meloxicam (MOBIC) 15 MG tablet; Take 1 tablet by mouth daily as needed for Pain         There are no Patient Instructions on file for this visit.      On this date 11/2/2021 I have spent 25 minutes reviewing previous notes, test results and face to face with the patient discussing the diagnosis and importance of compliance with the treatment plan as well as documenting on the day of the visit.       Armando Hercules MD   11/2/21

## 2021-11-14 ENCOUNTER — HOSPITAL ENCOUNTER (EMERGENCY)
Age: 46
Discharge: HOME | End: 2021-11-14
Payer: COMMERCIAL

## 2021-11-14 VITALS
WEIGHT: 130 LBS | HEART RATE: 68 BPM | HEIGHT: 63 IN | TEMPERATURE: 97.7 F | BODY MASS INDEX: 23.04 KG/M2 | DIASTOLIC BLOOD PRESSURE: 63 MMHG | OXYGEN SATURATION: 99 % | RESPIRATION RATE: 20 BRPM | SYSTOLIC BLOOD PRESSURE: 100 MMHG

## 2021-11-14 DIAGNOSIS — M25.511 RIGHT SHOULDER PAIN, UNSPECIFIED CHRONICITY: Primary | ICD-10-CM

## 2021-11-14 PROCEDURE — 99211 OFF/OP EST MAY X REQ PHY/QHP: CPT

## 2021-11-14 RX ORDER — NAPROXEN 500 MG/1
500 TABLET ORAL 2 TIMES DAILY
Qty: 14 TABLET | Refills: 0 | Status: SHIPPED | OUTPATIENT
Start: 2021-11-14 | End: 2022-01-19

## 2021-11-14 ASSESSMENT — PAIN DESCRIPTION - PAIN TYPE: TYPE: ACUTE PAIN

## 2021-11-14 ASSESSMENT — PAIN DESCRIPTION - DESCRIPTORS: DESCRIPTORS: ACHING;BURNING

## 2021-11-14 ASSESSMENT — PAIN DESCRIPTION - LOCATION: LOCATION: SHOULDER

## 2021-11-14 ASSESSMENT — PAIN SCALES - GENERAL: PAINLEVEL_OUTOF10: 6

## 2021-11-14 ASSESSMENT — PAIN DESCRIPTION - PROGRESSION: CLINICAL_PROGRESSION: GRADUALLY WORSENING

## 2021-11-14 ASSESSMENT — PAIN DESCRIPTION - ORIENTATION: ORIENTATION: RIGHT

## 2021-11-14 NOTE — Clinical Note
Bob Jenkins was seen and treated in our emergency department on 11/14/2021. She may return to work on 11/16/2021. If you have any questions or concerns, please don't hesitate to call.       Lili Mooney

## 2021-11-14 NOTE — ED PROVIDER NOTES
[facility]  Department of Emergency Medicine   ED  Encounter Note  Admit Date/RoomTime: 2021  1:29 PM  ED Room:     NAME: Doris Garcia  : 1975  MRN: [id number]     Chief Complaint:  Shoulder Pain (Ri shoulder sx in Feb pain increasing since)    History of Present Illness       Doris Garcia is a 48 y.o. old female who presents to the emergency department with a complaint of right shoulder pain. Patient states she had right shoulder surgery in 2021. States she would have an occasional flare up. In fact she just saw her PCP last month and was on steroids for right shoulder pain. She presents today with a complaint of right shoulder pain now for the past 3 days. All localized to the area were she had surgery. Pain is worse if she rotates her right arm in words. She is also right-handed and feels that aggravates it. She denies any injury. Denies any fall. Patient is regularly on Ultram, Neurontin and Zanaflex. She states those medications are not helping. She rates the discomfort a 6 out of 10. She has not tried any anti-inflammatories or Tylenol products for this complaint. ROS   Pertinent positives and negatives are stated within HPI, all other systems reviewed and are negative. Past Medical History:  has a past medical history of Anemia, Anxiety, Arthritis, Asthma, Chronic back pain, DDD (degenerative disc disease), Depression, Fibromyalgia, Fibromyalgia, IBS (irritable bowel syndrome), Migraines, Pain, and PTSD (post-traumatic stress disorder). Surgical History:  has a past surgical history that includes Tonsillectomy; knee surgery; shoulder surgery; Abdominal exploration surgery; other surgical history; other surgical history (1/10/13); Spine surgery (1/10/13); Nerve Block (Bilateral, 10 29 13); Nerve Block (13); Abdomen surgery; Colonoscopy; Hysterectomy; and Appendectomy. Social History:  reports that she has never smoked.  She has never used smokeless tobacco. She reports current alcohol use. She reports that she does not use drugs. Family History: family history includes Diabetes in her father; Emphysema in her father; High Blood Pressure in her father. Allergies: Codeine, Hydrocodone, Oxycodone, and Oxycontin [oxycodone hcl]    Physical Exam   Oxygen Saturation Interpretation: Normal.        ED Triage Vitals [11/14/21 1333]   BP Temp Temp Source Pulse Resp SpO2 Height Weight   100/63 97.7 °F (36.5 °C) Temporal 68 20 99 % 5' 3\" (1.6 m) 130 lb (59 kg)         General:  NAD. Alert and Oriented. Well-appearing. Skin:  Warm, dry. No rashes. Head:  Normocephalic. Atraumatic. Eyes:  EOMI. Conjunctiva normal.  ENT:  Oral mucosa moist.  Airway patent. Neck:  Supple. Normal ROM. Respiratory:  No respiratory distress. No labored breathing. Lungs clear without rales, rhonchi or wheezing. Cardiovascular:  Regular rate. No Murmur. No peripheral edema. Extremities warm and good color. Chest: Anterior chest wall on the right and clavicle are nontender to palpation. Extremities: Noted surgical scars to the right shoulder. Right shoulder with slight tenderness noted to the superior portion, anterior and lateral.  There is no deformity. Negative erythema. Negative warmth. Range of motion is intact to the right shoulder with flexion, extension and abduction. 2+ right radial pulse. Back:  Normal ROM. Nontender to palpation. Neuro:  Alert and Oriented to person, place, time and situation. Normal LOC. Moves all extremities. Speech fluent. Psych:  Calm and Cooperative. Normal thought process. Normal judgement. Lab / Imaging Results   (All laboratory and radiology results have been personally reviewed by myself)  Labs:  No results found for this visit on 11/14/21. Imaging: All Radiology results interpreted by Radiologist unless otherwise noted.   No orders to display     ED Course / Medical Decision Making Medications - No data to display     Re-examination:  11/14/21       Time:        Consult(s):   None    Procedure(s):   none    MDM:      Imaging was not obtained based on no suspicion for fracture / bony abnormality as per history/physical findings. Plan is subsequently for symptom control, limited use and  immobilization with appropriate outpatient follow-up. Plan of Care/Counseling:  Physician Assistant on duty reviewed today's visit with the patient in addition to providing specific details for the plan of care and counseling regarding the diagnosis and prognosis. Questions are answered at this time and are agreeable with the plan. Patient is already on Ultram pain medicine, Neurontin and Zanaflex. I recommend she start an anti-inflammatory and alternate that with Tylenol. Call her orthopedic doctor for follow-up. Assessment      1. Right shoulder pain, unspecified chronicity Stable but not controlled     Plan   Discharged home. Patient condition is good    New Medications     New Prescriptions    NAPROXEN (NAPROSYN) 500 MG TABLET    Take 1 tablet by mouth 2 times daily for 7 days     Electronically signed by MARIANNE Azar   DD: 11/14/21  **This report was transcribed using voice recognition software. Every effort was made to ensure accuracy; however, inadvertent computerized transcription errors may be present.   END OF ED PROVIDER NOTE          Lili Azar  11/14/21 0130

## 2022-01-19 ENCOUNTER — OFFICE VISIT (OUTPATIENT)
Dept: PRIMARY CARE CLINIC | Age: 47
End: 2022-01-19
Payer: COMMERCIAL

## 2022-01-19 VITALS
HEART RATE: 68 BPM | HEIGHT: 63 IN | TEMPERATURE: 97.6 F | OXYGEN SATURATION: 92 % | WEIGHT: 143 LBS | BODY MASS INDEX: 25.34 KG/M2 | DIASTOLIC BLOOD PRESSURE: 78 MMHG | SYSTOLIC BLOOD PRESSURE: 114 MMHG

## 2022-01-19 DIAGNOSIS — E73.9 LACTOSE INTOLERANCE: ICD-10-CM

## 2022-01-19 DIAGNOSIS — R19.8 ALTERNATING CONSTIPATION AND DIARRHEA: ICD-10-CM

## 2022-01-19 PROCEDURE — 1036F TOBACCO NON-USER: CPT | Performed by: INTERNAL MEDICINE

## 2022-01-19 PROCEDURE — G8419 CALC BMI OUT NRM PARAM NOF/U: HCPCS | Performed by: INTERNAL MEDICINE

## 2022-01-19 PROCEDURE — G8427 DOCREV CUR MEDS BY ELIG CLIN: HCPCS | Performed by: INTERNAL MEDICINE

## 2022-01-19 PROCEDURE — G8484 FLU IMMUNIZE NO ADMIN: HCPCS | Performed by: INTERNAL MEDICINE

## 2022-01-19 PROCEDURE — 99213 OFFICE O/P EST LOW 20 MIN: CPT | Performed by: INTERNAL MEDICINE

## 2022-01-19 RX ORDER — BUSPIRONE HYDROCHLORIDE 10 MG/1
10 TABLET ORAL 2 TIMES DAILY
COMMUNITY

## 2022-01-19 RX ORDER — HYDROXYZINE HYDROCHLORIDE 25 MG/1
25 TABLET, FILM COATED ORAL 2 TIMES DAILY PRN
COMMUNITY

## 2022-01-19 ASSESSMENT — PATIENT HEALTH QUESTIONNAIRE - PHQ9
6. FEELING BAD ABOUT YOURSELF - OR THAT YOU ARE A FAILURE OR HAVE LET YOURSELF OR YOUR FAMILY DOWN: 3
3. TROUBLE FALLING OR STAYING ASLEEP: 2
SUM OF ALL RESPONSES TO PHQ QUESTIONS 1-9: 14
SUM OF ALL RESPONSES TO PHQ QUESTIONS 1-9: 14
7. TROUBLE CONCENTRATING ON THINGS, SUCH AS READING THE NEWSPAPER OR WATCHING TELEVISION: 2
8. MOVING OR SPEAKING SO SLOWLY THAT OTHER PEOPLE COULD HAVE NOTICED. OR THE OPPOSITE, BEING SO FIGETY OR RESTLESS THAT YOU HAVE BEEN MOVING AROUND A LOT MORE THAN USUAL: 1
SUM OF ALL RESPONSES TO PHQ QUESTIONS 1-9: 14
5. POOR APPETITE OR OVEREATING: 2
4. FEELING TIRED OR HAVING LITTLE ENERGY: 2
SUM OF ALL RESPONSES TO PHQ QUESTIONS 1-9: 14
10. IF YOU CHECKED OFF ANY PROBLEMS, HOW DIFFICULT HAVE THESE PROBLEMS MADE IT FOR YOU TO DO YOUR WORK, TAKE CARE OF THINGS AT HOME, OR GET ALONG WITH OTHER PEOPLE: 2
2. FEELING DOWN, DEPRESSED OR HOPELESS: 2
9. THOUGHTS THAT YOU WOULD BE BETTER OFF DEAD, OR OF HURTING YOURSELF: 0

## 2022-01-19 NOTE — PROGRESS NOTES
Chief Complaint   Patient presents with    Diarrhea     Pt is here as an acute visit with c/o constipation / diarrhea that has been ongoing for a couple months. Pt has had no recent known exposure to Covid. This pt has been taking Miralax and Immodium. HPI:  Patient is here complaining of alternating diarrhea and constipation. She was diagnosed years ago after colonoscopy with lactose intolerance and she has been watching the and take some Lactaid. She does not eat except maybe 2 servings of fruit a week and 1 serving of vegetables every other day. She is drinking 2 glasses of water a day but the rest of the day she is drinking Sprite. She gets into the habit of having constipation for 2 to 3 days she started on MiraLAX and if she does not have a bowel movement right away she would take more MiraLAX until she ends up with diarrhea and then at that time she would start Imodium which constipates her and then she starts the cycle again. Luis Pelaez She was advised to increase her fiber in diet and increasing water intake. She was also advised to use Benefiber or FiberCon every day and to decrease the use of Imodium and MiraLAX. Past Medical History, Surgical History, and Family History has been reviewed and updated.     Review of Systems:  Constitutional:  No fever, no fatigue, no chills, no headaches, no weight change  Dermatology:  No rash, no mole, no dry or sensitive skin  ENT:  No cough, no sore throat, no sinus pain, no runny nose, no ear pain  Cardiology:  No chest pain, no palpitations, no leg edema, no shortness of breath, no PND  Gastroenterology:  No dysphagia, no abdominal pain, no nausea, no vomiting, no constipation, no diarrhea, no heartburn  Musculoskeletal:  No joint pain, no leg cramps, no back pain, no muscle aches  Respiratory:  No shortness of breath, no orthopnea, no wheezing, no KHALIL, no hemoptysis  Urology:  No blood in the urine, no urinary frequency, no urinary incontinence, no urinary urgency, no nocturia, no dysuria    Vitals:    01/19/22 0858   BP: 114/78   Pulse: 68   Temp: 97.6 °F (36.4 °C)   TempSrc: Temporal   SpO2: 92%   Weight: 143 lb (64.9 kg)   Height: 5' 3\" (1.6 m)       General:  Patient alert and oriented x 3, NAD, pleasant  HEENT:  Atraumatic, normocephalic, PERRLA, EOMI, clear conjunctiva, TMs clear, nose-clear, throat - no erythema  Neck:  Supple, no goiter, no carotid bruits, no LAD  Lungs:  CTA   Heart:  RRR, no murmurs, gallops or rubs  Abdomen:  Soft/nt/nd, + bowel sounds no splenomegaly, very mild discomfort on the suprapubic and left lower quadrant area  Extremities:  No clubbing, cyanosis or edema  Skin: unremarkable    Cholesterol, Total   Date Value Ref Range Status   03/05/2018 168 0 - 199 mg/dL Final     Triglycerides   Date Value Ref Range Status   03/05/2018 96 0 - 149 mg/dL Final     HDL   Date Value Ref Range Status   03/05/2018 45 >40 mg/dL Final     LDL Calculated   Date Value Ref Range Status   03/05/2018 104 (H) 0 - 99 mg/dL Final     VLDL Cholesterol Calculated   Date Value Ref Range Status   03/05/2018 19 mg/dL Final     Sodium   Date Value Ref Range Status   07/22/2021 139 132 - 146 mmol/L Final     Potassium   Date Value Ref Range Status   07/22/2021 4.6 3.5 - 5.0 mmol/L Final     Chloride   Date Value Ref Range Status   07/22/2021 102 98 - 107 mmol/L Final     CO2   Date Value Ref Range Status   07/22/2021 33 (H) 22 - 29 mmol/L Final     BUN   Date Value Ref Range Status   07/22/2021 11 6 - 20 mg/dL Final     CREATININE   Date Value Ref Range Status   07/22/2021 0.9 0.5 - 1.0 mg/dL Final     Glucose   Date Value Ref Range Status   07/22/2021 86 74 - 99 mg/dL Final     Calcium   Date Value Ref Range Status   07/22/2021 9.4 8.6 - 10.2 mg/dL Final     Total Protein   Date Value Ref Range Status   07/22/2021 7.4 6.4 - 8.3 g/dL Final     Albumin   Date Value Ref Range Status   07/22/2021 4.7 3.5 - 5.2 g/dL Final     Total Bilirubin   Date Value Ref Range Status 07/22/2021 0.2 0.0 - 1.2 mg/dL Final     Alkaline Phosphatase   Date Value Ref Range Status   07/22/2021 83 35 - 104 U/L Final     AST   Date Value Ref Range Status   07/22/2021 18 0 - 31 U/L Final     ALT   Date Value Ref Range Status   07/22/2021 7 0 - 32 U/L Final     GFR Non-   Date Value Ref Range Status   07/22/2021 >60 >=60 mL/min/1.73 Final     Comment:     Chronic Kidney Disease: less than 60 ml/min/1.73 sq.m. Kidney Failure: less than 15 ml/min/1.73 sq.m. Results valid for patients 18 years and older. GFR    Date Value Ref Range Status   07/22/2021 >60  Final        No results found. Assessment/Plan:      Outpatient Encounter Medications as of 1/19/2022   Medication Sig Dispense Refill    Probiotic Product (PROBIOTIC ADVANCED PO) Take 1 tablet by mouth daily      hydrOXYzine (ATARAX) 25 MG tablet Take 25 mg by mouth 2 times daily as needed for Anxiety      busPIRone (BUSPAR) 10 MG tablet Take 10 mg by mouth 2 times daily      DULoxetine (CYMBALTA) 60 MG extended release capsule take 1 capsule by mouth once daily      traMADol (ULTRAM) 50 MG tablet take 1 tablet by mouth twice a day if needed      tiZANidine (ZANAFLEX) 4 MG tablet Take 4 mg by mouth three times daily Pt takes two tabs TID      gabapentin (NEURONTIN) 400 MG capsule Take 1,200 mg by mouth 3 times daily.  [DISCONTINUED] naproxen (NAPROSYN) 500 MG tablet Take 1 tablet by mouth 2 times daily for 7 days 14 tablet 0    [DISCONTINUED] meloxicam (MOBIC) 15 MG tablet Take 1 tablet by mouth daily as needed for Pain (Patient not taking: Reported on 1/19/2022) 90 tablet 1     No facility-administered encounter medications on file as of 1/19/2022. Steffi Allison was seen today for diarrhea.     Diagnoses and all orders for this visit:    Alternating constipation and diarrhea    Lactose intolerance    Increase fiber in diet  Increase water intake  Benefiber or FiberCon daily    Patient Instructions   EBNIFIBER OR FIBERCON DAILY  Increase fiber and water in diet       On this date 1/19/2022 I have spent 25 minutes reviewing previous notes, test results and face to face with the patient discussing the diagnosis and importance of compliance with the treatment plan as well as documenting on the day of the visit.       Padmaja Thibodeaux MD   1/19/22

## 2022-02-01 ENCOUNTER — OFFICE VISIT (OUTPATIENT)
Dept: PRIMARY CARE CLINIC | Age: 47
End: 2022-02-01
Payer: COMMERCIAL

## 2022-02-01 VITALS
HEIGHT: 63 IN | OXYGEN SATURATION: 92 % | DIASTOLIC BLOOD PRESSURE: 80 MMHG | HEART RATE: 64 BPM | WEIGHT: 146.5 LBS | SYSTOLIC BLOOD PRESSURE: 118 MMHG | TEMPERATURE: 97.4 F | BODY MASS INDEX: 25.96 KG/M2

## 2022-02-01 DIAGNOSIS — Z12.31 BREAST CANCER SCREENING BY MAMMOGRAM: ICD-10-CM

## 2022-02-01 DIAGNOSIS — K58.1 IRRITABLE BOWEL SYNDROME WITH CONSTIPATION: Primary | ICD-10-CM

## 2022-02-01 DIAGNOSIS — M51.36 DEGENERATION OF LUMBAR INTERVERTEBRAL DISC: ICD-10-CM

## 2022-02-01 DIAGNOSIS — Z23 FLU VACCINE NEED: ICD-10-CM

## 2022-02-01 PROCEDURE — G8484 FLU IMMUNIZE NO ADMIN: HCPCS | Performed by: INTERNAL MEDICINE

## 2022-02-01 PROCEDURE — 90471 IMMUNIZATION ADMIN: CPT | Performed by: INTERNAL MEDICINE

## 2022-02-01 PROCEDURE — G8419 CALC BMI OUT NRM PARAM NOF/U: HCPCS | Performed by: INTERNAL MEDICINE

## 2022-02-01 PROCEDURE — 99213 OFFICE O/P EST LOW 20 MIN: CPT | Performed by: INTERNAL MEDICINE

## 2022-02-01 PROCEDURE — 90694 VACC AIIV4 NO PRSRV 0.5ML IM: CPT | Performed by: INTERNAL MEDICINE

## 2022-02-01 PROCEDURE — 1036F TOBACCO NON-USER: CPT | Performed by: INTERNAL MEDICINE

## 2022-02-01 PROCEDURE — G8427 DOCREV CUR MEDS BY ELIG CLIN: HCPCS | Performed by: INTERNAL MEDICINE

## 2022-02-01 NOTE — PROGRESS NOTES
Chief Complaint   Patient presents with    2 Week Follow-Up     Pt is here as a 2 week F/U, with no recent known exposure to Covid. Pt states she is feeling better at this time, and has now increased her fiber and feels this helps. Pt is now inquiring of not seeing her Pain Management D/T cost, and wanting PCP to fill all meds. HPI:  Patient is here for follow-up of chronic constipation and irritable bowel syndrome. She had started adding a Benefiber supplement to the diet every day and adding serving of fruit every day and that has helped her a lot. She has a bowel movement now every day or at the most every other day. No diarrhea no constipation no bloating. She is requesting for us to take care of the pain medication as it will be less costly for her but patient was advised that she has to continue with the pain clinic if she is contemplating all these medications for long-term and she understands and agrees. She is due for her screening mammogram will order one today  She was counseled to get the flu vaccine and she will receive it today. Past Medical History, Surgical History, and Family History has been reviewed and updated.     Review of Systems:  Constitutional:  No fever, no fatigue, no chills, no headaches, no weight change  Dermatology:  No rash, no mole, no dry or sensitive skin  ENT:  No cough, no sore throat, no sinus pain, no runny nose, no ear pain  Cardiology:  No chest pain, no palpitations, no leg edema, no shortness of breath, no PND  Gastroenterology:  No dysphagia, no abdominal pain, no nausea, no vomiting, no constipation, no diarrhea, no heartburn  Musculoskeletal:  No joint pain, no leg cramps, no back pain, no muscle aches  Respiratory:  No shortness of breath, no orthopnea, no wheezing, no KHALIL, no hemoptysis  Urology:  No blood in the urine, no urinary frequency, no urinary incontinence, no urinary urgency, no nocturia, no dysuria    Vitals:    02/01/22 0909   BP: 118/80 Pulse: 64   Temp: 97.4 °F (36.3 °C)   TempSrc: Temporal   SpO2: 92%   Weight: 146 lb 8 oz (66.5 kg)   Height: 5' 3\" (1.6 m)       General:  Patient alert and oriented x 3, NAD, pleasant  HEENT:  Atraumatic, normocephalic, PERRLA, EOMI, clear conjunctiva, TMs clear, nose-clear, throat - no erythema  Neck:  Supple, no goiter, no carotid bruits, no LAD  Lungs:  CTA   Heart:  RRR, no murmurs, gallops or rubs  Abdomen:  Soft/nt/nd, + bowel sounds  Extremities:  No clubbing, cyanosis or edema  Skin: unremarkable    Cholesterol, Total   Date Value Ref Range Status   03/05/2018 168 0 - 199 mg/dL Final     Triglycerides   Date Value Ref Range Status   03/05/2018 96 0 - 149 mg/dL Final     HDL   Date Value Ref Range Status   03/05/2018 45 >40 mg/dL Final     LDL Calculated   Date Value Ref Range Status   03/05/2018 104 (H) 0 - 99 mg/dL Final     VLDL Cholesterol Calculated   Date Value Ref Range Status   03/05/2018 19 mg/dL Final     Sodium   Date Value Ref Range Status   07/22/2021 139 132 - 146 mmol/L Final     Potassium   Date Value Ref Range Status   07/22/2021 4.6 3.5 - 5.0 mmol/L Final     Chloride   Date Value Ref Range Status   07/22/2021 102 98 - 107 mmol/L Final     CO2   Date Value Ref Range Status   07/22/2021 33 (H) 22 - 29 mmol/L Final     BUN   Date Value Ref Range Status   07/22/2021 11 6 - 20 mg/dL Final     CREATININE   Date Value Ref Range Status   07/22/2021 0.9 0.5 - 1.0 mg/dL Final     Glucose   Date Value Ref Range Status   07/22/2021 86 74 - 99 mg/dL Final     Calcium   Date Value Ref Range Status   07/22/2021 9.4 8.6 - 10.2 mg/dL Final     Total Protein   Date Value Ref Range Status   07/22/2021 7.4 6.4 - 8.3 g/dL Final     Albumin   Date Value Ref Range Status   07/22/2021 4.7 3.5 - 5.2 g/dL Final     Total Bilirubin   Date Value Ref Range Status   07/22/2021 0.2 0.0 - 1.2 mg/dL Final     Alkaline Phosphatase   Date Value Ref Range Status   07/22/2021 83 35 - 104 U/L Final     AST   Date Value Ref Range Status   07/22/2021 18 0 - 31 U/L Final     ALT   Date Value Ref Range Status   07/22/2021 7 0 - 32 U/L Final     GFR Non-   Date Value Ref Range Status   07/22/2021 >60 >=60 mL/min/1.73 Final     Comment:     Chronic Kidney Disease: less than 60 ml/min/1.73 sq.m. Kidney Failure: less than 15 ml/min/1.73 sq.m. Results valid for patients 18 years and older. GFR    Date Value Ref Range Status   07/22/2021 >60  Final        No results found. Assessment/Plan:      Outpatient Encounter Medications as of 2/1/2022   Medication Sig Dispense Refill    Probiotic Product (PROBIOTIC ADVANCED PO) Take 1 tablet by mouth daily      hydrOXYzine (ATARAX) 25 MG tablet Take 25 mg by mouth 2 times daily as needed for Anxiety      busPIRone (BUSPAR) 10 MG tablet Take 10 mg by mouth 2 times daily      DULoxetine (CYMBALTA) 60 MG extended release capsule take 1 capsule by mouth once daily      traMADol (ULTRAM) 50 MG tablet take 1 tablet by mouth twice a day if needed      tiZANidine (ZANAFLEX) 4 MG tablet Take 4 mg by mouth three times daily Pt takes two tabs TID      gabapentin (NEURONTIN) 400 MG capsule Take 1,200 mg by mouth 3 times daily. No facility-administered encounter medications on file as of 2/1/2022. Montse Noel was seen today for 2 week follow-up. Diagnoses and all orders for this visit:    Irritable bowel syndrome with constipation    Breast cancer screening by mammogram  -     Cancel: OSVALDO DIGITAL SCREEN W OR WO CAD BILATERAL; Future  -     OSVALDO DIGITAL SCREEN W OR WO CAD BILATERAL; Future    Flu vaccine need  -     INFLUENZA, QUADV, ADJUVANTED, 65 YRS =, IM, PF, PREFILL SYR, 0.5ML (FLUAD)    Degeneration of lumbar intervertebral disc         There are no Patient Instructions on file for this visit.      On this date 2/1/2022 I have spent 25 minutes reviewing previous notes, test results and face to face with the patient discussing the diagnosis and importance of compliance with the treatment plan as well as documenting on the day of the visit.       Bela Mistry MD   2/1/22

## 2022-03-01 ENCOUNTER — OFFICE VISIT (OUTPATIENT)
Dept: PRIMARY CARE CLINIC | Age: 47
End: 2022-03-01
Payer: COMMERCIAL

## 2022-03-01 VITALS
TEMPERATURE: 97.1 F | DIASTOLIC BLOOD PRESSURE: 78 MMHG | HEIGHT: 63 IN | OXYGEN SATURATION: 93 % | HEART RATE: 82 BPM | WEIGHT: 145.5 LBS | SYSTOLIC BLOOD PRESSURE: 112 MMHG | BODY MASS INDEX: 25.78 KG/M2

## 2022-03-01 DIAGNOSIS — R50.9 FEVER AND CHILLS: ICD-10-CM

## 2022-03-01 DIAGNOSIS — R59.0 PELVIC LYMPHADENOPATHY: ICD-10-CM

## 2022-03-01 LAB
ALBUMIN SERPL-MCNC: 4.4 G/DL (ref 3.5–5.2)
ALP BLD-CCNC: 81 U/L (ref 35–104)
ALT SERPL-CCNC: 13 U/L (ref 0–32)
ANION GAP SERPL CALCULATED.3IONS-SCNC: 11 MMOL/L (ref 7–16)
AST SERPL-CCNC: 19 U/L (ref 0–31)
BACTERIA: ABNORMAL /HPF
BASOPHILS ABSOLUTE: 0.04 E9/L (ref 0–0.2)
BASOPHILS RELATIVE PERCENT: 0.6 % (ref 0–2)
BILIRUB SERPL-MCNC: 0.5 MG/DL (ref 0–1.2)
BILIRUBIN URINE: NEGATIVE
BLOOD, URINE: NEGATIVE
BUN BLDV-MCNC: 12 MG/DL (ref 6–20)
C-REACTIVE PROTEIN: 0.3 MG/DL (ref 0–0.4)
CALCIUM SERPL-MCNC: 9.1 MG/DL (ref 8.6–10.2)
CHLORIDE BLD-SCNC: 106 MMOL/L (ref 98–107)
CLARITY: NORMAL
CO2: 26 MMOL/L (ref 22–29)
COLOR: YELLOW
CREAT SERPL-MCNC: 0.9 MG/DL (ref 0.5–1)
EOSINOPHILS ABSOLUTE: 0.06 E9/L (ref 0.05–0.5)
EOSINOPHILS RELATIVE PERCENT: 0.9 % (ref 0–6)
GFR AFRICAN AMERICAN: >60
GFR NON-AFRICAN AMERICAN: >60 ML/MIN/1.73
GLUCOSE BLD-MCNC: 82 MG/DL (ref 74–99)
GLUCOSE URINE: NEGATIVE MG/DL
HCT VFR BLD CALC: 46.7 % (ref 34–48)
HEMOGLOBIN: 14.8 G/DL (ref 11.5–15.5)
IMMATURE GRANULOCYTES #: 0.02 E9/L
IMMATURE GRANULOCYTES %: 0.3 % (ref 0–5)
KETONES, URINE: NEGATIVE MG/DL
LEUKOCYTE ESTERASE, URINE: NEGATIVE
LYMPHOCYTES ABSOLUTE: 1.17 E9/L (ref 1.5–4)
LYMPHOCYTES RELATIVE PERCENT: 17.5 % (ref 20–42)
MCH RBC QN AUTO: 28.7 PG (ref 26–35)
MCHC RBC AUTO-ENTMCNC: 31.7 % (ref 32–34.5)
MCV RBC AUTO: 90.7 FL (ref 80–99.9)
MONOCYTES ABSOLUTE: 0.28 E9/L (ref 0.1–0.95)
MONOCYTES RELATIVE PERCENT: 4.2 % (ref 2–12)
NEUTROPHILS ABSOLUTE: 5.13 E9/L (ref 1.8–7.3)
NEUTROPHILS RELATIVE PERCENT: 76.5 % (ref 43–80)
NITRITE, URINE: NEGATIVE
PDW BLD-RTO: 12.3 FL (ref 11.5–15)
PH UA: 6 (ref 5–9)
PLATELET # BLD: 186 E9/L (ref 130–450)
PMV BLD AUTO: 11.4 FL (ref 7–12)
POTASSIUM SERPL-SCNC: 4.6 MMOL/L (ref 3.5–5)
PROTEIN UA: NEGATIVE MG/DL
RBC # BLD: 5.15 E12/L (ref 3.5–5.5)
RBC UA: ABNORMAL /HPF (ref 0–2)
SEDIMENTATION RATE, ERYTHROCYTE: 3 MM/HR (ref 0–20)
SODIUM BLD-SCNC: 143 MMOL/L (ref 132–146)
SPECIFIC GRAVITY UA: 1.02 (ref 1–1.03)
TOTAL PROTEIN: 7.3 G/DL (ref 6.4–8.3)
UROBILINOGEN, URINE: 0.2 E.U./DL
WBC # BLD: 6.7 E9/L (ref 4.5–11.5)
WBC UA: ABNORMAL /HPF (ref 0–5)

## 2022-03-01 PROCEDURE — G8427 DOCREV CUR MEDS BY ELIG CLIN: HCPCS | Performed by: INTERNAL MEDICINE

## 2022-03-01 PROCEDURE — G8419 CALC BMI OUT NRM PARAM NOF/U: HCPCS | Performed by: INTERNAL MEDICINE

## 2022-03-01 PROCEDURE — 99213 OFFICE O/P EST LOW 20 MIN: CPT | Performed by: INTERNAL MEDICINE

## 2022-03-01 PROCEDURE — G8484 FLU IMMUNIZE NO ADMIN: HCPCS | Performed by: INTERNAL MEDICINE

## 2022-03-01 PROCEDURE — 1036F TOBACCO NON-USER: CPT | Performed by: INTERNAL MEDICINE

## 2022-03-01 RX ORDER — AMOXICILLIN 500 MG/1
500 CAPSULE ORAL 3 TIMES DAILY
Qty: 21 CAPSULE | Refills: 0 | Status: SHIPPED | OUTPATIENT
Start: 2022-03-01 | End: 2022-03-08

## 2022-03-01 NOTE — PROGRESS NOTES
Chief Complaint   Patient presents with    Neck Pain     Pt is here as an acute visit with no recent known exposure to Covid. Pt states she woke up 3 days ago, and has a knot on right side of her neck and also one on her left flank area, that is uncortable and she has tried numerous creams and remedies with no relief.  Lower Back Pain       HPI:  Patient is here complaining about swellings that she noticed on the base of the neck on the right side 4 days ago and it has been tender worse when she moves her head from 1 direction to another. Also she found another spot on her paralumbar spine on the left side again painful on bending down or moving from one side to another. She denies any abdominal pains nausea or vomiting but she has been having some fevers and chills over the last couple of days according to her although today her vitals are stable. She denies a history of STD. She had hysterectomy about 5 years ago secondary to menometrorrhagia and fibroid. She has not had a pelvic exam since then. On exam she had another spongy larger lymph node on the left inguinal area that is painful. No axillary or supraclavicular lymph nodes were appreciated. She noted that she had some sinus drainage lately over the last couple of days only. .    Past Medical History, Surgical History, and Family History has been reviewed and updated.     Review of Systems:  Constitutional:  No fever, no fatigue, no chills, no headaches, no weight change  Dermatology:  No rash, no mole, no dry or sensitive skin  ENT:  No cough, no sore throat, no sinus pain, no runny nose, no ear pain  Cardiology:  No chest pain, no palpitations, no leg edema, no shortness of breath, no PND  Gastroenterology:  No dysphagia, no abdominal pain, no nausea, no vomiting, no constipation, no diarrhea, no heartburn  Musculoskeletal:  No joint pain, no leg cramps, no back pain, no muscle aches  Respiratory:  No shortness of breath, no orthopnea, no wheezing, no KHALIL, no hemoptysis  Urology:  No blood in the urine, no urinary frequency, no urinary incontinence, no urinary urgency, no nocturia, no dysuria    Vitals:    03/01/22 0955   BP: 112/78   Pulse: 82   Temp: 97.1 °F (36.2 °C)   TempSrc: Temporal   SpO2: 93%   Weight: 145 lb 8 oz (66 kg)   Height: 5' 3\" (1.6 m)       General:  Patient alert and oriented x 3, NAD, pleasant  HEENT:  Atraumatic, normocephalic, PERRLA, EOMI, clear conjunctiva, TMs clear, nose-clear drainage with mild congestion, throat - no erythema  Neck:  Supple, no goiter, no carotid bruits, no LAD  Positive small lymph nodes on the posterior cervical line on the right side that is movable and mildly tender  Lungs:  CTA   Heart:  RRR, no murmurs, gallops or rubs  Abdomen:  Soft/nt/nd, + bowel sounds  Back: Positive small nodule on the left paralumbar area that is tender to deep palpation. Left inguinal lymph node that spongy about 1 cm in diameter and tender to touch.   Extremities:  No clubbing, cyanosis or edema  Skin: unremarkable    Cholesterol, Total   Date Value Ref Range Status   03/05/2018 168 0 - 199 mg/dL Final     Triglycerides   Date Value Ref Range Status   03/05/2018 96 0 - 149 mg/dL Final     HDL   Date Value Ref Range Status   03/05/2018 45 >40 mg/dL Final     LDL Calculated   Date Value Ref Range Status   03/05/2018 104 (H) 0 - 99 mg/dL Final     VLDL Cholesterol Calculated   Date Value Ref Range Status   03/05/2018 19 mg/dL Final     Sodium   Date Value Ref Range Status   07/22/2021 139 132 - 146 mmol/L Final     Potassium   Date Value Ref Range Status   07/22/2021 4.6 3.5 - 5.0 mmol/L Final     Chloride   Date Value Ref Range Status   07/22/2021 102 98 - 107 mmol/L Final     CO2   Date Value Ref Range Status   07/22/2021 33 (H) 22 - 29 mmol/L Final     BUN   Date Value Ref Range Status   07/22/2021 11 6 - 20 mg/dL Final     CREATININE   Date Value Ref Range Status   07/22/2021 0.9 0.5 - 1.0 mg/dL Final     Glucose   Date Value Ref Range Status   07/22/2021 86 74 - 99 mg/dL Final     Calcium   Date Value Ref Range Status   07/22/2021 9.4 8.6 - 10.2 mg/dL Final     Total Protein   Date Value Ref Range Status   07/22/2021 7.4 6.4 - 8.3 g/dL Final     Albumin   Date Value Ref Range Status   07/22/2021 4.7 3.5 - 5.2 g/dL Final     Total Bilirubin   Date Value Ref Range Status   07/22/2021 0.2 0.0 - 1.2 mg/dL Final     Alkaline Phosphatase   Date Value Ref Range Status   07/22/2021 83 35 - 104 U/L Final     AST   Date Value Ref Range Status   07/22/2021 18 0 - 31 U/L Final     ALT   Date Value Ref Range Status   07/22/2021 7 0 - 32 U/L Final     GFR Non-   Date Value Ref Range Status   07/22/2021 >60 >=60 mL/min/1.73 Final     Comment:     Chronic Kidney Disease: less than 60 ml/min/1.73 sq.m. Kidney Failure: less than 15 ml/min/1.73 sq.m. Results valid for patients 18 years and older. GFR    Date Value Ref Range Status   07/22/2021 >60  Final        No results found. Assessment/Plan:      Outpatient Encounter Medications as of 3/1/2022   Medication Sig Dispense Refill    amoxicillin (AMOXIL) 500 MG capsule Take 1 capsule by mouth 3 times daily for 7 days 21 capsule 0    Probiotic Product (PROBIOTIC ADVANCED PO) Take 1 tablet by mouth daily      hydrOXYzine (ATARAX) 25 MG tablet Take 25 mg by mouth 2 times daily as needed for Anxiety      busPIRone (BUSPAR) 10 MG tablet Take 10 mg by mouth 2 times daily      DULoxetine (CYMBALTA) 60 MG extended release capsule take 1 capsule by mouth once daily      traMADol (ULTRAM) 50 MG tablet take 1 tablet by mouth twice a day if needed      tiZANidine (ZANAFLEX) 4 MG tablet Take 4 mg by mouth three times daily Pt takes two tabs TID      gabapentin (NEURONTIN) 400 MG capsule Take 1,200 mg by mouth 3 times daily. No facility-administered encounter medications on file as of 3/1/2022.         Liang Manzanares was seen today for neck pain and lower back pain. Diagnoses and all orders for this visit:    Pelvic lymphadenopathy  -     Sedimentation Rate; Future  -     C-Reactive Protein; Future  -     CT ABDOMEN PELVIS W IV CONTRAST Additional Contrast? Oral; Future    Fever and chills  -     CBC with Auto Differential; Future  -     Comprehensive Metabolic Panel; Future  -     Urinalysis; Future  -     Sedimentation Rate; Future  -     C-Reactive Protein; Future  -     amoxicillin (AMOXIL) 500 MG capsule; Take 1 capsule by mouth 3 times daily for 7 days         There are no Patient Instructions on file for this visit. On this date 3/1/2022 I have spent 25 minutes reviewing previous notes, test results and face to face with the patient discussing the diagnosis and importance of compliance with the treatment plan as well as documenting on the day of the visit.       Luci Forman MD   3/1/22

## 2022-03-14 ENCOUNTER — HOSPITAL ENCOUNTER (OUTPATIENT)
Dept: MAMMOGRAPHY | Age: 47
Discharge: HOME | End: 2022-03-16
Payer: COMMERCIAL

## 2022-03-14 ENCOUNTER — HOSPITAL ENCOUNTER (OUTPATIENT)
Dept: CT IMAGING | Age: 47
Discharge: HOME | End: 2022-03-14
Payer: COMMERCIAL

## 2022-03-14 VITALS — WEIGHT: 145 LBS | HEIGHT: 63 IN | BODY MASS INDEX: 25.69 KG/M2

## 2022-03-14 DIAGNOSIS — Z12.31 BREAST CANCER SCREENING BY MAMMOGRAM: ICD-10-CM

## 2022-03-14 PROCEDURE — 77067 SCR MAMMO BI INCL CAD: CPT

## 2022-03-14 PROCEDURE — 74177 CT ABD & PELVIS W/CONTRAST: CPT

## 2022-03-14 PROCEDURE — 6360000004 HC RX CONTRAST MEDICATION: Performed by: RADIOLOGY

## 2022-03-14 RX ADMIN — IOPAMIDOL 75 ML: 755 INJECTION, SOLUTION INTRAVENOUS at 09:33

## 2022-03-14 RX ADMIN — IOHEXOL 50 ML: 240 INJECTION, SOLUTION INTRATHECAL; INTRAVASCULAR; INTRAVENOUS; ORAL at 09:33

## 2022-03-15 ENCOUNTER — OFFICE VISIT (OUTPATIENT)
Dept: PRIMARY CARE CLINIC | Age: 47
End: 2022-03-15
Payer: COMMERCIAL

## 2022-03-15 VITALS
OXYGEN SATURATION: 97 % | RESPIRATION RATE: 16 BRPM | SYSTOLIC BLOOD PRESSURE: 120 MMHG | TEMPERATURE: 97.3 F | HEART RATE: 74 BPM | HEIGHT: 63 IN | BODY MASS INDEX: 25.91 KG/M2 | DIASTOLIC BLOOD PRESSURE: 68 MMHG | WEIGHT: 146.2 LBS

## 2022-03-15 DIAGNOSIS — M46.1 SACROILIITIS (HCC): Chronic | ICD-10-CM

## 2022-03-15 DIAGNOSIS — F41.9 ANXIETY DISORDER, UNSPECIFIED TYPE: ICD-10-CM

## 2022-03-15 DIAGNOSIS — F32.A DEPRESSIVE DISORDER: Primary | ICD-10-CM

## 2022-03-15 DIAGNOSIS — T78.40XA ALLERGIC REACTION, INITIAL ENCOUNTER: ICD-10-CM

## 2022-03-15 DIAGNOSIS — F32.1 CURRENT MODERATE EPISODE OF MAJOR DEPRESSIVE DISORDER WITHOUT PRIOR EPISODE (HCC): ICD-10-CM

## 2022-03-15 PROBLEM — F11.20 CONTINUOUS OPIOID DEPENDENCE (HCC): Status: ACTIVE | Noted: 2022-03-15

## 2022-03-15 PROBLEM — F32.9 MAJOR DEPRESSIVE DISORDER, SINGLE EPISODE, UNSPECIFIED: Status: ACTIVE | Noted: 2018-05-22

## 2022-03-15 PROBLEM — M25.512 LEFT SHOULDER PAIN: Status: ACTIVE | Noted: 2022-03-15

## 2022-03-15 PROBLEM — M25.511 RIGHT SHOULDER PAIN: Status: ACTIVE | Noted: 2022-03-15

## 2022-03-15 PROBLEM — M75.100 TEAR OF ROTATOR CUFF: Status: ACTIVE | Noted: 2022-03-15

## 2022-03-15 PROBLEM — M47.812 SPONDYLOSIS OF CERVICAL REGION WITHOUT MYELOPATHY OR RADICULOPATHY: Status: ACTIVE | Noted: 2022-03-15

## 2022-03-15 PROCEDURE — 99213 OFFICE O/P EST LOW 20 MIN: CPT | Performed by: INTERNAL MEDICINE

## 2022-03-15 PROCEDURE — G8484 FLU IMMUNIZE NO ADMIN: HCPCS | Performed by: INTERNAL MEDICINE

## 2022-03-15 PROCEDURE — 1036F TOBACCO NON-USER: CPT | Performed by: INTERNAL MEDICINE

## 2022-03-15 PROCEDURE — G8427 DOCREV CUR MEDS BY ELIG CLIN: HCPCS | Performed by: INTERNAL MEDICINE

## 2022-03-15 PROCEDURE — G8419 CALC BMI OUT NRM PARAM NOF/U: HCPCS | Performed by: INTERNAL MEDICINE

## 2022-03-15 RX ORDER — NALOXONE HYDROCHLORIDE 4 MG/.1ML
SPRAY NASAL
COMMUNITY
Start: 2022-02-09 | End: 2022-04-20

## 2022-03-15 NOTE — PROGRESS NOTES
Chief Complaint   Patient presents with    Discuss Labs    Rash     Patient woke up this morning itching under her chin she feels bumps and she thinks it may be from the iv contrast she had yesterday for her CT of Abdomen        HPI:  Patient is here for follow-up of blood work and CT scan of abdomen and pelvis evaluate for lymphadenopathy. Blood work was discussed with patient all appears within normal limits no suggestion of any hematopoietic disorder. CAT scan of the abdomen and pelvis showed no lymphadenopathy or acute intraabdominal or pelvic pathology. Patient is seen gynecology and had a pelvic exam which according to her was within normal limits. She also had a mammogram yesterday but results are still pending. Lymph node on the right posterior auricular chain is still within the same size movable very small about less than 0.2 mm in diameter. The lump on the left paralumbar area feels more like a tendinitis as it is tender to touch and pain is radiating down the lower extremity. The inguinal lymph node has completely disappeared. Patient was reassured. Patient complains of symptoms of depression and anxiety lately since she has a lot of major changes in her household at this point and she has lost her job. She would like to go counseling in the meantime we will refer her. .  Complaining about itching on the base of the neck bilaterally with very fine rash that started this morning. She had a CT scan with the dye yesterday and this is the only change that she made. Questionable allergic reaction to the dye. We will start Claritin 10 mg daily or Benadryl as prescribed. Past Medical History, Surgical History, and Family History has been reviewed and updated.     Review of Systems:  Constitutional:  No fever, no fatigue, no chills, no headaches, no weight change  Dermatology:  No rash, no mole, no dry or sensitive skin  ENT:  No cough, no sore throat, no sinus pain, no runny nose, no ear pain  Cardiology:  No chest pain, no palpitations, no leg edema, no shortness of breath, no PND  Gastroenterology:  No dysphagia, no abdominal pain, no nausea, no vomiting, no constipation, no diarrhea, no heartburn  Musculoskeletal:  No joint pain, no leg cramps, no back pain, no muscle aches  Respiratory:  No shortness of breath, no orthopnea, no wheezing, no KHALIL, no hemoptysis  Urology:  No blood in the urine, no urinary frequency, no urinary incontinence, no urinary urgency, no nocturia, no dysuria    Vitals:    03/15/22 1207   BP: 120/68   Pulse: 74   Resp: 16   Temp: 97.3 °F (36.3 °C)   TempSrc: Temporal   SpO2: 97%   Weight: 146 lb 3.2 oz (66.3 kg)   Height: 5' 3\" (1.6 m)       General:  Patient alert and oriented x 3, NAD, appears depressed today  HEENT:  Atraumatic, normocephalic, PERRLA, EOMI, clear conjunctiva, TMs clear, nose-clear, throat - no erythema   Neck:  Supple, no goiter, no carotid bruits, no LAD except for a positive small right posterior auricular lymph node as described above  Lungs:  CTA   Heart:  RRR, no murmurs, gallops or rubs  Abdomen:  Soft/nt/nd, + bowel sounds  Extremities:  No clubbing, cyanosis or edema  Low back: Positive mild tenderness on the paralumbar spine on the left side  Skin: unremarkable    Cholesterol, Total   Date Value Ref Range Status   03/05/2018 168 0 - 199 mg/dL Final     Triglycerides   Date Value Ref Range Status   03/05/2018 96 0 - 149 mg/dL Final     HDL   Date Value Ref Range Status   03/05/2018 45 >40 mg/dL Final     LDL Calculated   Date Value Ref Range Status   03/05/2018 104 (H) 0 - 99 mg/dL Final     VLDL Cholesterol Calculated   Date Value Ref Range Status   03/05/2018 19 mg/dL Final     Sodium   Date Value Ref Range Status   03/01/2022 143 132 - 146 mmol/L Final     Potassium   Date Value Ref Range Status   03/01/2022 4.6 3.5 - 5.0 mmol/L Final     Chloride   Date Value Ref Range Status   03/01/2022 106 98 - 107 mmol/L Final     CO2   Date Value Ref Range Status   03/01/2022 26 22 - 29 mmol/L Final     BUN   Date Value Ref Range Status   03/01/2022 12 6 - 20 mg/dL Final     CREATININE   Date Value Ref Range Status   03/01/2022 0.9 0.5 - 1.0 mg/dL Final     Glucose   Date Value Ref Range Status   03/01/2022 82 74 - 99 mg/dL Final     Calcium   Date Value Ref Range Status   03/01/2022 9.1 8.6 - 10.2 mg/dL Final     Total Protein   Date Value Ref Range Status   03/01/2022 7.3 6.4 - 8.3 g/dL Final     Albumin   Date Value Ref Range Status   03/01/2022 4.4 3.5 - 5.2 g/dL Final     Total Bilirubin   Date Value Ref Range Status   03/01/2022 0.5 0.0 - 1.2 mg/dL Final     Alkaline Phosphatase   Date Value Ref Range Status   03/01/2022 81 35 - 104 U/L Final     AST   Date Value Ref Range Status   03/01/2022 19 0 - 31 U/L Final     ALT   Date Value Ref Range Status   03/01/2022 13 0 - 32 U/L Final     GFR Non-   Date Value Ref Range Status   03/01/2022 >60 >=60 mL/min/1.73 Final     Comment:     Chronic Kidney Disease: less than 60 ml/min/1.73 sq.m. Kidney Failure: less than 15 ml/min/1.73 sq.m. Results valid for patients 18 years and older. GFR    Date Value Ref Range Status   03/01/2022 >60  Final        CT ABDOMEN PELVIS W IV CONTRAST Additional Contrast? Oral    Result Date: 3/14/2022  EXAMINATION: CT OF THE ABDOMEN AND PELVIS WITH CONTRAST 3/14/2022 9:18 am TECHNIQUE: CT of the abdomen and pelvis was performed with the administration of intravenous contrast. Multiplanar reformatted images are provided for review. Dose modulation, iterative reconstruction, and/or weight based adjustment of the mA/kV was utilized to reduce the radiation dose to as low as reasonably achievable. COMPARISON: None. HISTORY: ORDERING SYSTEM PROVIDED HISTORY: Pelvic lymphadenopathy TECHNOLOGIST PROVIDED HISTORY: Additional Contrast?->Oral STAT Creatinine as needed:->Yes FINDINGS: Lower Chest: Lung bases are grossly clear.  Organs: Liver is homogeneous in appearance. The gallbladder reveals no evidence of stones. No underlying mass within the liver. No intrahepatic biliary ductal dilatation. No extrahepatic biliary ductal dilatation. Pancreas is homogeneous. Both adrenal glands are unremarkable. Symmetric enhancement identified of the renal parenchyma. No underlying mass or lesion. The spleen is homogeneous. GI/Bowel: The stomach is unremarkable. The small bowel is unremarkable. No wall thickening identified. No pelvic adenopathy. Stool seen diffusely throughout the colon to suggest clinical presentation of mild constipation. No evidence of obvious obstruction or obstructing lesion. Pelvis: The pelvic organs are unremarkable. The uterus has been surgically removed. The bladder is incompletely distended. No gross mass or lesion. The ovaries are both unremarkable in appearance. Cyst identified in the right adnexa suggesting an ovarian cyst.  The small cystic structure measures 2 cm in size. Peritoneum/Retroperitoneum: There is no abdominal or retroperitoneal lymphadenopathy. No free fluid or free air. No inguinal adenopathy identified. There is no pelvic adenopathy. There is no significant atherosclerotic disease within the abdominal aorta or iliac vessels. Bones/Soft Tissues: Bony structures reveal hardware identified within the lumbar spine. No acute bony abnormality identified. No ventral abdominal wall mass. There is no pelvic adenopathy or inguinal adenopathy identified. No CT evidence of acute intra-abdominal or pelvic abnormality.         Assessment/Plan:      Outpatient Encounter Medications as of 3/15/2022   Medication Sig Dispense Refill    naloxone 4 MG/0.1ML LIQD nasal spray by Nasal route      Probiotic Product (PROBIOTIC ADVANCED PO) Take 1 tablet by mouth daily      hydrOXYzine (ATARAX) 25 MG tablet Take 25 mg by mouth 2 times daily as needed for Anxiety      busPIRone (BUSPAR) 10 MG tablet Take 10 mg by mouth 2 times daily      DULoxetine (CYMBALTA) 60 MG extended release capsule take 1 capsule by mouth once daily      traMADol (ULTRAM) 50 MG tablet take 1 tablet by mouth twice a day if needed      tiZANidine (ZANAFLEX) 4 MG tablet Take 4 mg by mouth three times daily Pt takes two tabs TID      gabapentin (NEURONTIN) 400 MG capsule Take 1,200 mg by mouth 3 times daily. No facility-administered encounter medications on file as of 3/15/2022. Lizbeth Thierryjennifer was seen today for discuss labs and rash. Diagnoses and all orders for this visit:    Depressive disorder  -     External Referral To Psychology    Anxiety disorder, unspecified type  -     External Referral To Psychology    Sacroiliitis [facility])    Current moderate episode of major depressive disorder without prior episode (HCC)    Allergic reaction, initial encounter         Patient Instructions   claritin 10 mg daily or benadryl 50 mg x1 now then 25 mg daily       On this date 3/15/2022 I have spent 25 minutes reviewing previous notes, test results and face to face with the patient discussing the diagnosis and importance of compliance with the treatment plan as well as documenting on the day of the visit.       Valentin Arias MD   3/15/22

## 2022-04-20 ENCOUNTER — OFFICE VISIT (OUTPATIENT)
Dept: PRIMARY CARE CLINIC | Age: 47
End: 2022-04-20
Payer: COMMERCIAL

## 2022-04-20 VITALS
WEIGHT: 146.2 LBS | TEMPERATURE: 97.2 F | SYSTOLIC BLOOD PRESSURE: 114 MMHG | BODY MASS INDEX: 25.91 KG/M2 | OXYGEN SATURATION: 94 % | HEART RATE: 100 BPM | HEIGHT: 63 IN | DIASTOLIC BLOOD PRESSURE: 70 MMHG

## 2022-04-20 DIAGNOSIS — A46 ERYSIPELAS: Primary | ICD-10-CM

## 2022-04-20 PROCEDURE — 99213 OFFICE O/P EST LOW 20 MIN: CPT | Performed by: STUDENT IN AN ORGANIZED HEALTH CARE EDUCATION/TRAINING PROGRAM

## 2022-04-20 PROCEDURE — G8419 CALC BMI OUT NRM PARAM NOF/U: HCPCS | Performed by: STUDENT IN AN ORGANIZED HEALTH CARE EDUCATION/TRAINING PROGRAM

## 2022-04-20 PROCEDURE — G8427 DOCREV CUR MEDS BY ELIG CLIN: HCPCS | Performed by: STUDENT IN AN ORGANIZED HEALTH CARE EDUCATION/TRAINING PROGRAM

## 2022-04-20 PROCEDURE — 1036F TOBACCO NON-USER: CPT | Performed by: STUDENT IN AN ORGANIZED HEALTH CARE EDUCATION/TRAINING PROGRAM

## 2022-04-20 RX ORDER — FLUCONAZOLE 150 MG/1
150 TABLET ORAL ONCE
Qty: 1 TABLET | Refills: 0 | Status: SHIPPED | OUTPATIENT
Start: 2022-04-20 | End: 2022-04-20

## 2022-04-20 RX ORDER — CEPHALEXIN 500 MG/1
500 CAPSULE ORAL 2 TIMES DAILY
Qty: 14 CAPSULE | Refills: 0 | Status: SHIPPED | OUTPATIENT
Start: 2022-04-20 | End: 2022-04-27

## 2022-04-20 ASSESSMENT — ENCOUNTER SYMPTOMS: RESPIRATORY NEGATIVE: 1

## 2022-04-20 NOTE — PROGRESS NOTES
Renate Lamas (:  1975) is a 48 y.o. female,Established patient, here for evaluation of the following chief complaint(s): Mass (Pt presents with a lump that is reddened under right ear, that pt states started with a pimple. Pt. states this started 7 days ago. Pt went to Urgent Care yesterday and was  given an antibiotic to fill and pt chose to schedule PCP visit. )         ASSESSMENT/PLAN:  1. Erysipelas  -     cephALEXin (KEFLEX) 500 MG capsule; Take 1 capsule by mouth 2 times daily for 7 days, Disp-14 capsule, R-0Normal      No follow-ups on file. Advised patient to stop using peroxide; will try antibiotics and advised patient to take ibuprofen for inflammation; to call office if not improvement       Subjective   SUBJECTIVE/OBJECTIVE:  HPI     Patient is a 47 y/o F who presents for acute visit for lump under her ear. She noticed a pimple in her L ear about a week ago and then a few days later, began to experience pain and redness of her ear. She then has noticed redness behind her ear and an enlarged lymph node. She went to urgent care yesterday and was told to wash ear with peroxide and gave her an antibiotic to take in a few days if no improvement, however patient unable to  antibiotic yet. Denies any drainage from the ear or changes to hearing; no exposure to any allergens; no fever or chills; has been taking tylenol without relief     Review of Systems   Constitutional: Negative. HENT: Positive for ear pain. Respiratory: Negative. Cardiovascular: Negative. Skin: Positive for rash. Objective   Physical Exam  Vitals reviewed. Constitutional:       General: She is not in acute distress. Appearance: Normal appearance. HENT:      Head: Normocephalic and atraumatic. Right Ear: Tympanic membrane, ear canal and external ear normal. There is no impacted cerumen.       Left Ear: Tympanic membrane, ear canal and external ear normal. There is no impacted cerumen. Ears:      Comments: Right outer ear erythematous  Confluent erythematous rash behind the ear   No drainage or lesion noted     Nose: Nose normal.   Eyes:      General:         Right eye: No discharge. Left eye: No discharge. Cardiovascular:      Rate and Rhythm: Normal rate and regular rhythm. Pulses: Normal pulses. Heart sounds: Normal heart sounds. Pulmonary:      Effort: Pulmonary effort is normal.      Breath sounds: Normal breath sounds. Lymphadenopathy:      Cervical: Cervical adenopathy (post auricular adenopathy on R side) present. Neurological:      Mental Status: She is alert. An electronic signature was used to authenticate this note.     --My Mc MD

## 2022-07-11 ENCOUNTER — OFFICE VISIT (OUTPATIENT)
Dept: PRIMARY CARE CLINIC | Age: 47
End: 2022-07-11
Payer: COMMERCIAL

## 2022-07-11 VITALS
HEIGHT: 63 IN | TEMPERATURE: 97.9 F | DIASTOLIC BLOOD PRESSURE: 70 MMHG | OXYGEN SATURATION: 94 % | HEART RATE: 74 BPM | WEIGHT: 150.9 LBS | BODY MASS INDEX: 26.74 KG/M2 | SYSTOLIC BLOOD PRESSURE: 108 MMHG

## 2022-07-11 DIAGNOSIS — M25.461 EFFUSION OF BURSA OF RIGHT KNEE: ICD-10-CM

## 2022-07-11 DIAGNOSIS — M25.561 ACUTE PAIN OF RIGHT KNEE: ICD-10-CM

## 2022-07-11 PROBLEM — M25.569 KNEE PAIN: Status: ACTIVE | Noted: 2017-05-01

## 2022-07-11 PROCEDURE — 1036F TOBACCO NON-USER: CPT | Performed by: INTERNAL MEDICINE

## 2022-07-11 PROCEDURE — G8419 CALC BMI OUT NRM PARAM NOF/U: HCPCS | Performed by: INTERNAL MEDICINE

## 2022-07-11 PROCEDURE — G8427 DOCREV CUR MEDS BY ELIG CLIN: HCPCS | Performed by: INTERNAL MEDICINE

## 2022-07-11 PROCEDURE — 99213 OFFICE O/P EST LOW 20 MIN: CPT | Performed by: INTERNAL MEDICINE

## 2022-07-11 RX ORDER — QUETIAPINE FUMARATE 50 MG/1
TABLET, FILM COATED ORAL
COMMUNITY
Start: 2022-07-08 | End: 2022-09-29

## 2022-07-11 RX ORDER — NAPROXEN 500 MG/1
500 TABLET ORAL 2 TIMES DAILY WITH MEALS
Qty: 60 TABLET | Refills: 3 | Status: SHIPPED
Start: 2022-07-11 | End: 2022-09-29

## 2022-07-11 SDOH — ECONOMIC STABILITY: FOOD INSECURITY: WITHIN THE PAST 12 MONTHS, THE FOOD YOU BOUGHT JUST DIDN'T LAST AND YOU DIDN'T HAVE MONEY TO GET MORE.: NEVER TRUE

## 2022-07-11 SDOH — ECONOMIC STABILITY: FOOD INSECURITY: WITHIN THE PAST 12 MONTHS, YOU WORRIED THAT YOUR FOOD WOULD RUN OUT BEFORE YOU GOT MONEY TO BUY MORE.: NEVER TRUE

## 2022-07-11 ASSESSMENT — SOCIAL DETERMINANTS OF HEALTH (SDOH): HOW HARD IS IT FOR YOU TO PAY FOR THE VERY BASICS LIKE FOOD, HOUSING, MEDICAL CARE, AND HEATING?: NOT HARD AT ALL

## 2022-08-10 ENCOUNTER — OFFICE VISIT (OUTPATIENT)
Dept: PRIMARY CARE CLINIC | Age: 47
End: 2022-08-10
Payer: COMMERCIAL

## 2022-08-10 VITALS
BODY MASS INDEX: 27.39 KG/M2 | DIASTOLIC BLOOD PRESSURE: 77 MMHG | HEART RATE: 75 BPM | TEMPERATURE: 97.5 F | SYSTOLIC BLOOD PRESSURE: 111 MMHG | HEIGHT: 63 IN | WEIGHT: 154.6 LBS | OXYGEN SATURATION: 97 %

## 2022-08-10 DIAGNOSIS — M25.561 ACUTE PAIN OF RIGHT KNEE: Primary | ICD-10-CM

## 2022-08-10 PROBLEM — F13.930 BENZODIAZEPINE WITHDRAWAL WITHOUT COMPLICATION (HCC): Status: RESOLVED | Noted: 2018-05-09 | Resolved: 2022-08-10

## 2022-08-10 PROBLEM — F11.20 CONTINUOUS OPIOID DEPENDENCE (HCC): Status: RESOLVED | Noted: 2022-03-15 | Resolved: 2022-08-10

## 2022-08-10 PROCEDURE — 99213 OFFICE O/P EST LOW 20 MIN: CPT | Performed by: INTERNAL MEDICINE

## 2022-08-10 PROCEDURE — G8419 CALC BMI OUT NRM PARAM NOF/U: HCPCS | Performed by: INTERNAL MEDICINE

## 2022-08-10 PROCEDURE — G8427 DOCREV CUR MEDS BY ELIG CLIN: HCPCS | Performed by: INTERNAL MEDICINE

## 2022-08-10 PROCEDURE — 1036F TOBACCO NON-USER: CPT | Performed by: INTERNAL MEDICINE

## 2022-08-10 NOTE — PROGRESS NOTES
Chief Complaint   Patient presents with    Knee Pain     Pt is here as a F/U, from her right knee injury and states it feels better but when she removes the brace at night, it feels weak. HPI:  Patient is here for follow-up of right knee pain and effusion. She has been using the knee brace and that has been helping her a lot stabilizing the knee. She has not been very compliant with the Naprosyn but when she takes it she feels a lot better. On exam she still have a little bit of effusion on the inferior aspect of the patella with some crepitus especially laterally and some tenderness around the area. We will check an x-ray of the knee to ensure stabilization    Past Medical History, Surgical History, and Family History has been reviewed and updated.     Review of Systems:  Constitutional:  No fever, no fatigue, no chills, no headaches, no weight change  Dermatology:  No rash, no mole, no dry or sensitive skin  ENT:  No cough, no sore throat, no sinus pain, no runny nose, no ear pain  Cardiology:  No chest pain, no palpitations, no leg edema, no shortness of breath, no PND  Gastroenterology:  No dysphagia, no abdominal pain, no nausea, no vomiting, no constipation, no diarrhea, no heartburn  Musculoskeletal:  No joint pain, no leg cramps, no back pain, no muscle aches  Respiratory:  No shortness of breath, no orthopnea, no wheezing, no KHALIL, no hemoptysis  Urology:  No blood in the urine, no urinary frequency, no urinary incontinence, no urinary urgency, no nocturia, no dysuria    Vitals:    08/10/22 1146   BP: 111/77   Pulse: 75   Temp: 97.5 °F (36.4 °C)   TempSrc: Temporal   SpO2: 97%   Weight: 154 lb 9.6 oz (70.1 kg)   Height: 5' 3\" (1.6 m)       General:  Patient alert and oriented x 3, NAD, pleasant  HEENT:  Atraumatic, normocephalic, PERRLA, EOMI, clear conjunctiva, TMs clear, nose-clear, throat - no erythema  Neck:  Supple, no goiter, no carotid bruits, no LAD  Lungs:  CTA   Heart:  RRR, no murmurs, gallops or rubs  Abdomen:  Soft/nt/nd, + bowel sounds  Extremities:  No clubbing, cyanosis or edema  Right knee: Mild effusion with bursitis as described above  Skin: unremarkable    Cholesterol, Total   Date Value Ref Range Status   03/05/2018 168 0 - 199 mg/dL Final     Triglycerides   Date Value Ref Range Status   03/05/2018 96 0 - 149 mg/dL Final     HDL   Date Value Ref Range Status   03/05/2018 45 >40 mg/dL Final     LDL Calculated   Date Value Ref Range Status   03/05/2018 104 (H) 0 - 99 mg/dL Final     VLDL Cholesterol Calculated   Date Value Ref Range Status   03/05/2018 19 mg/dL Final     Sodium   Date Value Ref Range Status   03/01/2022 143 132 - 146 mmol/L Final     Potassium   Date Value Ref Range Status   03/01/2022 4.6 3.5 - 5.0 mmol/L Final     Chloride   Date Value Ref Range Status   03/01/2022 106 98 - 107 mmol/L Final     CO2   Date Value Ref Range Status   03/01/2022 26 22 - 29 mmol/L Final     BUN   Date Value Ref Range Status   03/01/2022 12 6 - 20 mg/dL Final     Creatinine   Date Value Ref Range Status   03/01/2022 0.9 0.5 - 1.0 mg/dL Final     Glucose   Date Value Ref Range Status   03/01/2022 82 74 - 99 mg/dL Final     Calcium   Date Value Ref Range Status   03/01/2022 9.1 8.6 - 10.2 mg/dL Final     Total Protein   Date Value Ref Range Status   03/01/2022 7.3 6.4 - 8.3 g/dL Final     Albumin   Date Value Ref Range Status   03/01/2022 4.4 3.5 - 5.2 g/dL Final     Total Bilirubin   Date Value Ref Range Status   03/01/2022 0.5 0.0 - 1.2 mg/dL Final     Alkaline Phosphatase   Date Value Ref Range Status   03/01/2022 81 35 - 104 U/L Final     AST   Date Value Ref Range Status   03/01/2022 19 0 - 31 U/L Final     ALT   Date Value Ref Range Status   03/01/2022 13 0 - 32 U/L Final     GFR Non-   Date Value Ref Range Status   03/01/2022 >60 >=60 mL/min/1.73 Final     Comment:     Chronic Kidney Disease: less than 60 ml/min/1.73 sq.m.           Kidney Failure: less than 15 ml/min/1.73 sq.m.  Results valid for patients 18 years and older. GFR    Date Value Ref Range Status   03/01/2022 >60  Final        No results found. Assessment/Plan:      Outpatient Encounter Medications as of 8/10/2022   Medication Sig Dispense Refill    QUEtiapine (SEROQUEL) 50 MG tablet take 1 tablet by mouth at bedtime      naproxen (NAPROSYN) 500 MG tablet Take 1 tablet by mouth 2 times daily (with meals) 60 tablet 3    Probiotic Product (PROBIOTIC ADVANCED PO) Take 1 tablet by mouth daily      hydrOXYzine (ATARAX) 25 MG tablet Take 25 mg by mouth 2 times daily as needed for Anxiety      busPIRone (BUSPAR) 10 MG tablet Take 10 mg by mouth 2 times daily      DULoxetine (CYMBALTA) 60 MG extended release capsule Take 60 mg by mouth 2 times daily       traMADol (ULTRAM) 50 MG tablet take 1 tablet by mouth twice a day if needed      tiZANidine (ZANAFLEX) 4 MG tablet Take 4 mg by mouth three times daily Pt takes two tabs TID      gabapentin (NEURONTIN) 400 MG capsule Take 1,200 mg by mouth 3 times daily. No facility-administered encounter medications on file as of 8/10/2022. José Ogden was seen today for knee pain. Diagnoses and all orders for this visit:    Acute pain of right knee  -     XR KNEE RIGHT (1-2 VIEWS); Future    Continue Naprosyn twice a day with food    There are no Patient Instructions on file for this visit. On this date 8/10/2022 I have spent 25 minutes reviewing previous notes, test results and face to face with the patient discussing the diagnosis and importance of compliance with the treatment plan as well as documenting on the day of the visit.       Lalita Vincent MD   8/10/22

## 2022-08-11 ENCOUNTER — HOSPITAL ENCOUNTER (OUTPATIENT)
Age: 47
Discharge: HOME | End: 2022-08-13
Payer: COMMERCIAL

## 2022-08-11 ENCOUNTER — HOSPITAL ENCOUNTER (OUTPATIENT)
Dept: GENERAL RADIOLOGY | Age: 47
Discharge: HOME | End: 2022-08-13
Payer: COMMERCIAL

## 2022-08-11 DIAGNOSIS — M25.561 ACUTE PAIN OF RIGHT KNEE: ICD-10-CM

## 2022-08-11 PROCEDURE — 73562 X-RAY EXAM OF KNEE 3: CPT

## 2022-09-29 ENCOUNTER — OFFICE VISIT (OUTPATIENT)
Dept: PRIMARY CARE CLINIC | Age: 47
End: 2022-09-29
Payer: COMMERCIAL

## 2022-09-29 VITALS
SYSTOLIC BLOOD PRESSURE: 103 MMHG | OXYGEN SATURATION: 97 % | HEART RATE: 65 BPM | DIASTOLIC BLOOD PRESSURE: 70 MMHG | HEIGHT: 63 IN | TEMPERATURE: 97.7 F | WEIGHT: 158.5 LBS | BODY MASS INDEX: 28.08 KG/M2

## 2022-09-29 DIAGNOSIS — M62.838 MUSCLE SPASM: ICD-10-CM

## 2022-09-29 PROCEDURE — 1036F TOBACCO NON-USER: CPT | Performed by: INTERNAL MEDICINE

## 2022-09-29 PROCEDURE — 99213 OFFICE O/P EST LOW 20 MIN: CPT | Performed by: INTERNAL MEDICINE

## 2022-09-29 PROCEDURE — G8419 CALC BMI OUT NRM PARAM NOF/U: HCPCS | Performed by: INTERNAL MEDICINE

## 2022-09-29 PROCEDURE — G8427 DOCREV CUR MEDS BY ELIG CLIN: HCPCS | Performed by: INTERNAL MEDICINE

## 2022-09-29 RX ORDER — NAPROXEN SODIUM 220 MG
TABLET ORAL
COMMUNITY
Start: 2022-09-24

## 2022-09-29 RX ORDER — MELOXICAM 15 MG/1
15 TABLET ORAL DAILY
Qty: 90 TABLET | Refills: 1 | Status: SHIPPED | OUTPATIENT
Start: 2022-09-29

## 2022-09-29 RX ORDER — POLYETHYLENE GLYCOL 3350 17 G/17G
POWDER, FOR SOLUTION ORAL
COMMUNITY
Start: 2022-09-26

## 2022-09-29 RX ORDER — CLONIDINE HYDROCHLORIDE 0.1 MG/1
TABLET ORAL
COMMUNITY
Start: 2022-09-07

## 2022-09-29 NOTE — PROGRESS NOTES
Chief Complaint   Patient presents with    Back Pain     Pt is here as an acute visit with left mid back pain that started a couple days ago, and feels it might be from lifting her dad when he fell. Pt was taking PRN medications last night and this am was feeling the same. HPI:  Patient is here complaining of left-sided thoracic pain that started few days ago when she was trying to lift her dad of the ground. Pain is worse on bending or moving around especially to the left side. She denies any direct trauma to the area. On exam she has a muscular spasm on the parathoracic spine area which is tender to palpation. This is at the level of T8-T12. Past Medical History, Surgical History, and Family History has been reviewed and updated.     Review of Systems:  Constitutional:  No fever, no fatigue, no chills, no headaches, no weight change  Dermatology:  No rash, no mole, no dry or sensitive skin  ENT:  No cough, no sore throat, no sinus pain, no runny nose, no ear pain  Cardiology:  No chest pain, no palpitations, no leg edema, no shortness of breath, no PND  Gastroenterology:  No dysphagia, no abdominal pain, no nausea, no vomiting, no constipation, no diarrhea, no heartburn  Musculoskeletal:  No joint pain, no leg cramps, no back pain, no muscle aches  Respiratory:  No shortness of breath, no orthopnea, no wheezing, no KHALIL, no hemoptysis  Urology:  No blood in the urine, no urinary frequency, no urinary incontinence, no urinary urgency, no nocturia, no dysuria    Vitals:    09/29/22 1343   BP: 103/70   Pulse: 65   Temp: 97.7 °F (36.5 °C)   TempSrc: Temporal   SpO2: 97%   Weight: 158 lb 8 oz (71.9 kg)   Height: 5' 3\" (1.6 m)       General:  Patient alert and oriented x 3, NAD, pleasant  HEENT:  Atraumatic, normocephalic, PERRLA, EOMI, clear conjunctiva, TMs clear, nose-clear, throat - no erythema  Neck:  Supple, no goiter, no carotid bruits, no LAD  Lungs:  CTA   Heart:  RRR, no murmurs, gallops or rubs  Abdomen:  Soft/nt/nd, + bowel sounds  Back: Muscular spasm on the left side as described above  Extremities:  No clubbing, cyanosis or edema  Skin: unremarkable    Cholesterol, Total   Date Value Ref Range Status   03/05/2018 168 0 - 199 mg/dL Final     Triglycerides   Date Value Ref Range Status   03/05/2018 96 0 - 149 mg/dL Final     HDL   Date Value Ref Range Status   03/05/2018 45 >40 mg/dL Final     LDL Calculated   Date Value Ref Range Status   03/05/2018 104 (H) 0 - 99 mg/dL Final     VLDL Cholesterol Calculated   Date Value Ref Range Status   03/05/2018 19 mg/dL Final     Sodium   Date Value Ref Range Status   03/01/2022 143 132 - 146 mmol/L Final     Potassium   Date Value Ref Range Status   03/01/2022 4.6 3.5 - 5.0 mmol/L Final     Chloride   Date Value Ref Range Status   03/01/2022 106 98 - 107 mmol/L Final     CO2   Date Value Ref Range Status   03/01/2022 26 22 - 29 mmol/L Final     BUN   Date Value Ref Range Status   03/01/2022 12 6 - 20 mg/dL Final     Creatinine   Date Value Ref Range Status   03/01/2022 0.9 0.5 - 1.0 mg/dL Final     Glucose   Date Value Ref Range Status   03/01/2022 82 74 - 99 mg/dL Final     Calcium   Date Value Ref Range Status   03/01/2022 9.1 8.6 - 10.2 mg/dL Final     Total Protein   Date Value Ref Range Status   03/01/2022 7.3 6.4 - 8.3 g/dL Final     Albumin   Date Value Ref Range Status   03/01/2022 4.4 3.5 - 5.2 g/dL Final     Total Bilirubin   Date Value Ref Range Status   03/01/2022 0.5 0.0 - 1.2 mg/dL Final     Alkaline Phosphatase   Date Value Ref Range Status   03/01/2022 81 35 - 104 U/L Final     AST   Date Value Ref Range Status   03/01/2022 19 0 - 31 U/L Final     ALT   Date Value Ref Range Status   03/01/2022 13 0 - 32 U/L Final     GFR Non-   Date Value Ref Range Status   03/01/2022 >60 >=60 mL/min/1.73 Final     Comment:     Chronic Kidney Disease: less than 60 ml/min/1.73 sq.m.           Kidney Failure: less than 15 ml/min/1.73 needed      tiZANidine (ZANAFLEX) 4 MG tablet Take 4 mg by mouth three times daily Pt takes two tabs TID      gabapentin (NEURONTIN) 400 MG capsule Take 1,200 mg by mouth 3 times daily. [DISCONTINUED] QUEtiapine (SEROQUEL) 50 MG tablet take 1 tablet by mouth at bedtime      [DISCONTINUED] naproxen (NAPROSYN) 500 MG tablet Take 1 tablet by mouth 2 times daily (with meals) 60 tablet 3     No facility-administered encounter medications on file as of 9/29/2022. Alexis Sandy was seen today for back pain. Diagnoses and all orders for this visit:    Muscle spasm    Other orders  -     meloxicam (MOBIC) 15 MG tablet; Take 1 tablet by mouth daily    Tizanidine twice a day  Heating pad and massage    There are no Patient Instructions on file for this visit. On this date 9/29/2022 I have spent 25 minutes reviewing previous notes, test results and face to face with the patient discussing the diagnosis and importance of compliance with the treatment plan as well as documenting on the day of the visit.       Jasmin Lucas MD   9/29/22

## 2022-11-02 DIAGNOSIS — M25.561 ACUTE PAIN OF RIGHT KNEE: ICD-10-CM

## 2022-11-02 RX ORDER — NAPROXEN 500 MG/1
TABLET ORAL
COMMUNITY
Start: 2022-10-02 | End: 2022-11-02

## 2022-11-03 RX ORDER — NAPROXEN 500 MG/1
TABLET ORAL
Qty: 60 TABLET | Refills: 3 | OUTPATIENT
Start: 2022-11-03

## 2022-11-20 ENCOUNTER — APPOINTMENT (OUTPATIENT)
Dept: GENERAL RADIOLOGY | Age: 47
End: 2022-11-20
Payer: COMMERCIAL

## 2022-11-20 ENCOUNTER — HOSPITAL ENCOUNTER (EMERGENCY)
Age: 47
Discharge: HOME | End: 2022-11-20
Payer: COMMERCIAL

## 2022-11-20 VITALS
TEMPERATURE: 98 F | HEART RATE: 70 BPM | OXYGEN SATURATION: 100 % | RESPIRATION RATE: 18 BRPM | WEIGHT: 150 LBS | BODY MASS INDEX: 26.57 KG/M2 | SYSTOLIC BLOOD PRESSURE: 134 MMHG | DIASTOLIC BLOOD PRESSURE: 73 MMHG

## 2022-11-20 DIAGNOSIS — S63.91XA HAND SPRAIN, RIGHT, INITIAL ENCOUNTER: Primary | ICD-10-CM

## 2022-11-20 PROCEDURE — 73130 X-RAY EXAM OF HAND: CPT

## 2022-11-20 PROCEDURE — 99211 OFF/OP EST MAY X REQ PHY/QHP: CPT

## 2022-11-20 ASSESSMENT — PAIN SCALES - GENERAL: PAINLEVEL_OUTOF10: 4

## 2022-11-20 ASSESSMENT — PAIN DESCRIPTION - PAIN TYPE: TYPE: CHRONIC PAIN

## 2022-11-20 ASSESSMENT — PAIN DESCRIPTION - ORIENTATION: ORIENTATION: RIGHT

## 2022-11-20 ASSESSMENT — PAIN DESCRIPTION - DESCRIPTORS: DESCRIPTORS: ACHING

## 2022-11-20 ASSESSMENT — PAIN DESCRIPTION - FREQUENCY: FREQUENCY: INTERMITTENT

## 2022-11-20 ASSESSMENT — PAIN DESCRIPTION - LOCATION: LOCATION: HAND

## 2022-11-20 ASSESSMENT — PAIN - FUNCTIONAL ASSESSMENT: PAIN_FUNCTIONAL_ASSESSMENT: 0-10

## 2022-11-21 NOTE — ED PROVIDER NOTES
[address] [facility]  Department of Emergency Medicine  UC Encounter Note  22   10:56 PM EST      NAME: Kelvin Santa  :  1975  MRN:  [id number]    Chief Complaint: Hand Pain (Right thumb pain, no injury, started Friday, hx of fibromyalgia)      This is a 40-year-old female the presents to urgent care complaining of right hand pain mostly in the area of her thumb and thenar eminence. She states pain is worse with movement no numbness or tingling. On first contact patient she appears to be in no acute distress. Review of Systems  Pertinent positives and negatives are stated within HPI, all other systems reviewed and are negative. Physical Exam  Vitals and nursing note reviewed. Constitutional:       Appearance: She is well-developed. HENT:      Head: Normocephalic and atraumatic. Right Ear: Hearing and external ear normal.      Left Ear: Hearing and external ear normal.      Nose: Nose normal.      Mouth/Throat:      Pharynx: Uvula midline. Eyes:      General: Lids are normal.      Conjunctiva/sclera: Conjunctivae normal.      Pupils: Pupils are equal, round, and reactive to light. Cardiovascular:      Rate and Rhythm: Normal rate and regular rhythm. Heart sounds: Normal heart sounds. No murmur heard. Pulmonary:      Effort: Pulmonary effort is normal.      Breath sounds: Normal breath sounds. Abdominal:      General: Bowel sounds are normal.      Palpations: Abdomen is soft. Abdomen is not rigid. Tenderness: There is no abdominal tenderness. There is no guarding or rebound. Musculoskeletal:      Cervical back: Normal range of motion and neck supple. Comments: Tenderness to right hand specially in the IP joint and the right proximal phalanx and the right thenar eminence area. No wrist pain. Has palpable radial pulse. No open area no cyanosis. Has brisk capillary refill. Skin:     General: Skin is warm and dry.       Findings: No abrasion or rash. Neurological:      General: No focal deficit present. Mental Status: She is alert and oriented to person, place, and time. GCS: GCS eye subscore is 4. GCS verbal subscore is 5. GCS motor subscore is 6. Cranial Nerves: No cranial nerve deficit. Sensory: No sensory deficit. Coordination: Coordination normal.      Gait: Gait normal.       Procedures    MDM  Number of Diagnoses or Management Options  Hand sprain, right, initial encounter  Diagnosis management comments: Patient in no acute distress. X-ray was reviewed. Treat for sprain in the right hand especially around the right thumb. Placed in a removable Velcro thumb spica splint told to use that when she is not at work. Instructions given. Return if worse             --------------------------------------------- PAST HISTORY ---------------------------------------------  Past Medical History:  has a past medical history of Anemia, Anxiety, Arthritis, Asthma, Chronic back pain, DDD (degenerative disc disease), Depression, Fibromyalgia, Fibromyalgia, IBS (irritable bowel syndrome), Migraines, Pain, and PTSD (post-traumatic stress disorder). Past Surgical History:  has a past surgical history that includes Tonsillectomy; knee surgery; shoulder surgery; Abdominal exploration surgery; other surgical history; other surgical history (1/10/13); Spine surgery (1/10/13); Nerve Block (Bilateral, 10 29 13); Nerve Block (11/05/13); Abdomen surgery; Colonoscopy; Hysterectomy; and Appendectomy. Social History:  reports that she has never smoked. She has never used smokeless tobacco. She reports current alcohol use. She reports that she does not use drugs. Family History: family history includes Diabetes in her father; Emphysema in her father; High Blood Pressure in her father. The patients home medications have been reviewed.     Allergies: Codeine, Hydrocodone, Oxycodone, and Oxycontin [oxycodone hcl]    -------------------------------------------------- RESULTS -------------------------------------------------  No results found for this visit on 11/20/22. XR HAND RIGHT (MIN 3 VIEWS)   Final Result   No acute osseous findings about the right hand. Normal alignment. Minimal   right thumb osteoarthritis similar to prior exam.             ------------------------- NURSING NOTES AND VITALS REVIEWED ---------------------------   The nursing notes within the ED encounter and vital signs as below have been reviewed. /73   Pulse 70   Temp 98 °F (36.7 °C)   Resp 18   Wt 150 lb (68 kg)   LMP 03/23/2016   SpO2 100%   BMI 26.57 kg/m²   Oxygen Saturation Interpretation: Normal      ------------------------------------------ PROGRESS NOTES ------------------------------------------   I have spoken with the patient and discussed todays results, in addition to providing specific details for the plan of care and counseling regarding the diagnosis and prognosis. Their questions are answered at this time and they are agreeable with the plan.      --------------------------------- ADDITIONAL PROVIDER NOTES ---------------------------------     This patient is stable for discharge. I have shared the specific conditions for return, as well as the importance of follow-up. * NOTE: This report was transcribed using voice recognition software. Every effort was made to ensure accuracy; however, inadvertent computerized transcription errors may be present.    --------------------------------- IMPRESSION AND DISPOSITION ---------------------------------    IMPRESSION  1.  Hand sprain, right, initial encounter        DISPOSITION  Disposition: Discharge to home  Patient condition is good       Sophie RATNA Paulson  11/20/22 9830

## 2022-11-28 ENCOUNTER — HOSPITAL ENCOUNTER (OUTPATIENT)
Age: 47
Discharge: HOME | End: 2022-11-28
Payer: COMMERCIAL

## 2022-11-28 LAB
ALBUMIN SERPL-MCNC: 4.2 G/DL (ref 3.5–5.2)
ALP BLD-CCNC: 81 U/L (ref 35–104)
ALT SERPL-CCNC: 15 U/L (ref 0–32)
ANION GAP SERPL CALCULATED.3IONS-SCNC: 8 MMOL/L (ref 7–16)
AST SERPL-CCNC: 14 U/L (ref 0–31)
BASOPHILS ABSOLUTE: 0.04 E9/L (ref 0–0.2)
BASOPHILS RELATIVE PERCENT: 0.5 % (ref 0–2)
BILIRUB SERPL-MCNC: 0.2 MG/DL (ref 0–1.2)
BUN BLDV-MCNC: 10 MG/DL (ref 6–20)
CALCIUM SERPL-MCNC: 8.8 MG/DL (ref 8.6–10.2)
CHLORIDE BLD-SCNC: 104 MMOL/L (ref 98–107)
CHOLESTEROL, FASTING: 187 MG/DL (ref 0–199)
CO2: 28 MMOL/L (ref 22–29)
CREAT SERPL-MCNC: 0.8 MG/DL (ref 0.5–1)
EOSINOPHILS ABSOLUTE: 0.17 E9/L (ref 0.05–0.5)
EOSINOPHILS RELATIVE PERCENT: 2.3 % (ref 0–6)
GFR SERPL CREATININE-BSD FRML MDRD: >60 ML/MIN/1.73
GLUCOSE BLD-MCNC: 80 MG/DL (ref 74–99)
HBA1C MFR BLD: 5.6 % (ref 4–5.6)
HCT VFR BLD CALC: 40.1 % (ref 34–48)
HDLC SERPL-MCNC: 52 MG/DL
HEMOGLOBIN: 13.1 G/DL (ref 11.5–15.5)
IMMATURE GRANULOCYTES #: 0.02 E9/L
IMMATURE GRANULOCYTES %: 0.3 % (ref 0–5)
LDL CHOLESTEROL CALCULATED: 100 MG/DL (ref 0–99)
LYMPHOCYTES ABSOLUTE: 1.77 E9/L (ref 1.5–4)
LYMPHOCYTES RELATIVE PERCENT: 23.4 % (ref 20–42)
MCH RBC QN AUTO: 29 PG (ref 26–35)
MCHC RBC AUTO-ENTMCNC: 32.7 % (ref 32–34.5)
MCV RBC AUTO: 88.9 FL (ref 80–99.9)
MONOCYTES ABSOLUTE: 0.44 E9/L (ref 0.1–0.95)
MONOCYTES RELATIVE PERCENT: 5.8 % (ref 2–12)
NEUTROPHILS ABSOLUTE: 5.11 E9/L (ref 1.8–7.3)
NEUTROPHILS RELATIVE PERCENT: 67.7 % (ref 43–80)
PDW BLD-RTO: 12.2 FL (ref 11.5–15)
PLATELET # BLD: 217 E9/L (ref 130–450)
PMV BLD AUTO: 10.9 FL (ref 7–12)
POTASSIUM SERPL-SCNC: 4 MMOL/L (ref 3.5–5)
RBC # BLD: 4.51 E12/L (ref 3.5–5.5)
SODIUM BLD-SCNC: 140 MMOL/L (ref 132–146)
TOTAL PROTEIN: 6.5 G/DL (ref 6.4–8.3)
TRIGLYCERIDE, FASTING: 174 MG/DL (ref 0–149)
TSH SERPL DL<=0.05 MIU/L-ACNC: 1.83 UIU/ML (ref 0.27–4.2)
VLDLC SERPL CALC-MCNC: 35 MG/DL
WBC # BLD: 7.6 E9/L (ref 4.5–11.5)

## 2022-11-28 PROCEDURE — 80061 LIPID PANEL: CPT

## 2022-11-28 PROCEDURE — 80053 COMPREHEN METABOLIC PANEL: CPT

## 2022-11-28 PROCEDURE — 36415 COLL VENOUS BLD VENIPUNCTURE: CPT

## 2022-11-28 PROCEDURE — 84443 ASSAY THYROID STIM HORMONE: CPT

## 2022-11-28 PROCEDURE — 83036 HEMOGLOBIN GLYCOSYLATED A1C: CPT

## 2022-11-28 PROCEDURE — 85025 COMPLETE CBC W/AUTO DIFF WBC: CPT

## 2022-12-06 ENCOUNTER — OFFICE VISIT (OUTPATIENT)
Dept: PRIMARY CARE CLINIC | Age: 47
End: 2022-12-06
Payer: COMMERCIAL

## 2022-12-06 VITALS
BODY MASS INDEX: 27.91 KG/M2 | TEMPERATURE: 97.3 F | SYSTOLIC BLOOD PRESSURE: 112 MMHG | OXYGEN SATURATION: 96 % | HEART RATE: 75 BPM | DIASTOLIC BLOOD PRESSURE: 80 MMHG | HEIGHT: 63 IN | WEIGHT: 157.5 LBS

## 2022-12-06 DIAGNOSIS — M47.812 SPONDYLOSIS OF CERVICAL REGION WITHOUT MYELOPATHY OR RADICULOPATHY: ICD-10-CM

## 2022-12-06 DIAGNOSIS — M54.12 CERVICAL RADICULITIS: Primary | ICD-10-CM

## 2022-12-06 PROCEDURE — G8419 CALC BMI OUT NRM PARAM NOF/U: HCPCS | Performed by: INTERNAL MEDICINE

## 2022-12-06 PROCEDURE — G8484 FLU IMMUNIZE NO ADMIN: HCPCS | Performed by: INTERNAL MEDICINE

## 2022-12-06 PROCEDURE — G8427 DOCREV CUR MEDS BY ELIG CLIN: HCPCS | Performed by: INTERNAL MEDICINE

## 2022-12-06 PROCEDURE — 99213 OFFICE O/P EST LOW 20 MIN: CPT | Performed by: INTERNAL MEDICINE

## 2022-12-06 PROCEDURE — 1036F TOBACCO NON-USER: CPT | Performed by: INTERNAL MEDICINE

## 2022-12-06 RX ORDER — METHYLPREDNISOLONE 4 MG/1
TABLET ORAL
Qty: 1 KIT | Refills: 0 | Status: SHIPPED | OUTPATIENT
Start: 2022-12-06 | End: 2022-12-12

## 2022-12-06 RX ORDER — ACETAMINOPHEN 160 MG
1 TABLET,DISINTEGRATING ORAL DAILY
COMMUNITY

## 2022-12-06 RX ORDER — ARIPIPRAZOLE 2 MG/1
TABLET ORAL
COMMUNITY
Start: 2022-11-30

## 2022-12-06 RX ORDER — RAMELTEON 8 MG/1
TABLET ORAL
COMMUNITY
Start: 2022-11-30

## 2022-12-06 RX ORDER — GRAPE SEED EXT/BIOFLAV,CITRUS 50MG-250MG
1 CAPSULE ORAL DAILY
COMMUNITY

## 2022-12-06 RX ORDER — BUSPIRONE HYDROCHLORIDE 15 MG/1
TABLET ORAL
COMMUNITY
Start: 2022-11-30

## 2022-12-06 RX ORDER — CHOLECALCIFEROL (VITAMIN D3) 125 MCG
500 CAPSULE ORAL DAILY
COMMUNITY

## 2022-12-06 NOTE — PROGRESS NOTES
Chief Complaint   Patient presents with    Neck Pain     Pt is an acute visit with neck discomfort and thinks it might be a pinched nerve that started a few weeks ago. HPI:  Patient is here complaining of right-sided neck pain radiating to the right upper extremity. This has been going on for a couple of weeks but got progressively worse over the last few days. She has tried a massager and she got a couple of massage therapies without much help. As a matter fact the pain is going all the way to her palms at 1 point with the burning zapping sensation that she is calling an electric shock. This sounds more like a neuropathic pain. Patient had a CT scan of the neck back in 2018 which was within normal limits. She denies any trauma or any lifting. She is already on meloxicam 15 mg daily and gabapentin maximum dose. We will try a Medrol Dosepak with alternating heat and cold. Patient is also wondering about chiropractor visit and she was advised to hold off on that for another week or 2 until the inflammation subsides. .    Past Medical History, Surgical History, and Family History has been reviewed and updated.     Review of Systems:  Constitutional:  No fever, no fatigue, no chills, no headaches, no weight change  Dermatology:  No rash, no mole, no dry or sensitive skin  ENT:  No cough, no sore throat, no sinus pain, no runny nose, no ear pain  Cardiology:  No chest pain, no palpitations, no leg edema, no shortness of breath, no PND  Gastroenterology:  No dysphagia, no abdominal pain, no nausea, no vomiting, no constipation, no diarrhea, no heartburn  Musculoskeletal:  No joint pain, no leg cramps, no back pain, no muscle aches  Respiratory:  No shortness of breath, no orthopnea, no wheezing, no KHALIL, no hemoptysis  Urology:  No blood in the urine, no urinary frequency, no urinary incontinence, no urinary urgency, no nocturia, no dysuria    Vitals:    12/06/22 1204   BP: 112/80   Pulse: 75   Temp: 97.3 °F (36.3 °C)   TempSrc: Temporal   SpO2: 96%   Weight: 157 lb 8 oz (71.4 kg)   Height: 5' 3\" (1.6 m)       General:  Patient alert and oriented x 3, NAD, pleasant  HEENT:  Atraumatic, normocephalic, PERRLA, EOMI, clear conjunctiva, TMs clear, nose-clear, throat - no erythema  Neck:  Supple, no goiter, no carotid bruits, no LAD  Tenderness on the right C7 pedicle with the pain as a burning sensation radiating all the way down to the mid forearm suggestive of radiculopathy.   Lungs:  CTA   Heart:  RRR, no murmurs, gallops or rubs  Abdomen:  Soft/nt/nd, + bowel sounds  Lymph node examination: unremarkable  Neurological exam : unremarkable  Extremities:  No clubbing, cyanosis or edema  Skin: unremarkable    Hemoglobin A1C   Date Value Ref Range Status   11/28/2022 5.6 4.0 - 5.6 % Final     Cholesterol, Total   Date Value Ref Range Status   03/05/2018 168 0 - 199 mg/dL Final     Triglycerides   Date Value Ref Range Status   03/05/2018 96 0 - 149 mg/dL Final     HDL   Date Value Ref Range Status   11/28/2022 52 >40 mg/dL Final     LDL Calculated   Date Value Ref Range Status   11/28/2022 100 (H) 0 - 99 mg/dL Final     VLDL Cholesterol Calculated   Date Value Ref Range Status   11/28/2022 35 mg/dL Final     Sodium   Date Value Ref Range Status   11/28/2022 140 132 - 146 mmol/L Final     Potassium   Date Value Ref Range Status   11/28/2022 4.0 3.5 - 5.0 mmol/L Final     Chloride   Date Value Ref Range Status   11/28/2022 104 98 - 107 mmol/L Final     CO2   Date Value Ref Range Status   11/28/2022 28 22 - 29 mmol/L Final     BUN   Date Value Ref Range Status   11/28/2022 10 6 - 20 mg/dL Final     Creatinine   Date Value Ref Range Status   11/28/2022 0.8 0.5 - 1.0 mg/dL Final     Glucose   Date Value Ref Range Status   11/28/2022 80 74 - 99 mg/dL Final     Calcium   Date Value Ref Range Status   11/28/2022 8.8 8.6 - 10.2 mg/dL Final     Total Protein   Date Value Ref Range Status   11/28/2022 6.5 6.4 - 8.3 g/dL Final     Albumin Date Value Ref Range Status   11/28/2022 4.2 3.5 - 5.2 g/dL Final     Total Bilirubin   Date Value Ref Range Status   11/28/2022 0.2 0.0 - 1.2 mg/dL Final     Alkaline Phosphatase   Date Value Ref Range Status   11/28/2022 81 35 - 104 U/L Final     AST   Date Value Ref Range Status   11/28/2022 14 0 - 31 U/L Final     ALT   Date Value Ref Range Status   11/28/2022 15 0 - 32 U/L Final     Est, Glom Filt Rate   Date Value Ref Range Status   11/28/2022 >60 >=60 mL/min/1.73 Final     Comment:     Pediatric calculator link  [email or web address] [email or web address]_calculatorped  Effective Oct 3, 2022  These results are not intended for use in patients  <25years of age. eGFR results are calculated without  a race factor using the 2021 CKD-EPI equation. Careful  clinical correlation is recommended, particularly when  comparing to results calculated using previous equations. The CKD-EPI equation is less accurate in patients with  extremes of muscle mass, extra-renal metabolism of  creatinine, excessive creatinine ingestion, or following  therapy that affects renal tubular secretion. GFR    Date Value Ref Range Status   03/01/2022 >60  Final        XR HAND RIGHT (MIN 3 VIEWS)    Result Date: 11/20/2022  EXAMINATION: THREE XRAY VIEWS OF THE RIGHT HAND 11/20/2022 7:55 pm COMPARISON: Right hand series from June 27, 2021 HISTORY: ORDERING SYSTEM PROVIDED HISTORY: pain TECHNOLOGIST PROVIDED HISTORY: Reason for exam:->pain FINDINGS: Radiographs of the right hand demonstrate no fractures with preserved alignment. Normal appearance of the radiocarpal joint. Minimal right thumb CMC and interphalangeal joint degenerative spurring. No erosive changes. Osseous mineralization is normal.  No soft tissue abnormality. No acute osseous findings about the right hand. Normal alignment.   Minimal right thumb osteoarthritis similar to prior exam.        Assessment/Plan:      Outpatient Encounter Medications as of 12/6/2022   Medication Sig Dispense Refill    ramelteon (ROZEREM) 8 MG tablet take 1 tablet by mouth at bedtime AVOID ADMINISTERING WITH HIGH FAT MEAL      ARIPiprazole (ABILIFY) 2 MG tablet take 1 tablet by mouth every evening with food      busPIRone (BUSPAR) 15 MG tablet take 1 tablet by mouth twice a day      Cholecalciferol (VITAMIN D3) 50 MCG (2000 UT) CAPS Take 1 capsule by mouth daily      vitamin B-12 (CYANOCOBALAMIN) 500 MCG tablet Take 500 mcg by mouth daily      Black Cohosh 540 MG CAPS Take 1 each by mouth daily      methylPREDNISolone (MEDROL DOSEPACK) 4 MG tablet Take by mouth. 1 kit 0    cloNIDine (CATAPRES) 0.1 MG tablet take 1 tablet by mouth at bedtime if needed      diclofenac sodium (VOLTAREN) 1 % GEL apply 4 grams to affected area four times a day DO NOT APPLY MORE. ..  (REFER TO PRESCRIPTION NOTES). RA SENNA 8.6 MG tablet take 2 tablets by mouth at bedtime      GAVILAX 17 GM/SCOOP powder take 17GM (DISSOLVED IN WATER) by mouth once daily      meloxicam (MOBIC) 15 MG tablet Take 1 tablet by mouth daily 90 tablet 1    Probiotic Product (PROBIOTIC ADVANCED PO) Take 1 tablet by mouth daily      hydrOXYzine (ATARAX) 25 MG tablet Take 25 mg by mouth 2 times daily as needed for Anxiety      DULoxetine (CYMBALTA) 60 MG extended release capsule Take 60 mg by mouth 2 times daily       traMADol (ULTRAM) 50 MG tablet take 1 tablet by mouth twice a day if needed      tiZANidine (ZANAFLEX) 4 MG tablet Take 4 mg by mouth three times daily Pt takes two tabs TID      gabapentin (NEURONTIN) 400 MG capsule Take 1,200 mg by mouth 3 times daily. [DISCONTINUED] busPIRone (BUSPAR) 10 MG tablet Take 10 mg by mouth 2 times daily       No facility-administered encounter medications on file as of 12/6/2022. Rudy Justiner was seen today for neck pain.     Diagnoses and all orders for this visit:    Cervical radiculitis    Spondylosis of cervical region without myelopathy or radiculopathy    Other orders  -     methylPREDNISolone (MEDROL DOSEPACK) 4 MG tablet; Take by mouth. There are no Patient Instructions on file for this visit. On this date 12/6/2022 I have spent 25 minutes reviewing previous notes, test results and face to face with the patient discussing the diagnosis and importance of compliance with the treatment plan as well as documenting on the day of the visit.       Macy Veliz MD   12/6/22

## 2023-03-09 ENCOUNTER — OFFICE VISIT (OUTPATIENT)
Dept: PRIMARY CARE CLINIC | Age: 48
End: 2023-03-09
Payer: COMMERCIAL

## 2023-03-09 VITALS
DIASTOLIC BLOOD PRESSURE: 78 MMHG | BODY MASS INDEX: 28.23 KG/M2 | WEIGHT: 159.3 LBS | SYSTOLIC BLOOD PRESSURE: 128 MMHG | HEART RATE: 62 BPM | TEMPERATURE: 97.5 F | HEIGHT: 63 IN | OXYGEN SATURATION: 97 %

## 2023-03-09 DIAGNOSIS — H65.92 LEFT NON-SUPPURATIVE OTITIS MEDIA: ICD-10-CM

## 2023-03-09 DIAGNOSIS — J02.8 ACUTE PHARYNGITIS DUE TO OTHER SPECIFIED ORGANISMS: ICD-10-CM

## 2023-03-09 PROCEDURE — G8427 DOCREV CUR MEDS BY ELIG CLIN: HCPCS | Performed by: INTERNAL MEDICINE

## 2023-03-09 PROCEDURE — G8484 FLU IMMUNIZE NO ADMIN: HCPCS | Performed by: INTERNAL MEDICINE

## 2023-03-09 PROCEDURE — G8419 CALC BMI OUT NRM PARAM NOF/U: HCPCS | Performed by: INTERNAL MEDICINE

## 2023-03-09 PROCEDURE — 1036F TOBACCO NON-USER: CPT | Performed by: INTERNAL MEDICINE

## 2023-03-09 PROCEDURE — 99213 OFFICE O/P EST LOW 20 MIN: CPT | Performed by: INTERNAL MEDICINE

## 2023-03-09 RX ORDER — PREGABALIN 150 MG/1
CAPSULE ORAL
COMMUNITY
Start: 2023-03-08

## 2023-03-09 RX ORDER — AMOXICILLIN 500 MG/1
500 CAPSULE ORAL 3 TIMES DAILY
Qty: 21 CAPSULE | Refills: 0 | Status: SHIPPED | OUTPATIENT
Start: 2023-03-09 | End: 2023-03-16

## 2023-03-09 RX ORDER — CETIRIZINE HYDROCHLORIDE 10 MG/1
10 TABLET ORAL DAILY
Qty: 30 TABLET | Refills: 0 | Status: SHIPPED | OUTPATIENT
Start: 2023-03-09

## 2023-03-09 SDOH — ECONOMIC STABILITY: FOOD INSECURITY: WITHIN THE PAST 12 MONTHS, THE FOOD YOU BOUGHT JUST DIDN'T LAST AND YOU DIDN'T HAVE MONEY TO GET MORE.: NEVER TRUE

## 2023-03-09 SDOH — ECONOMIC STABILITY: FOOD INSECURITY: WITHIN THE PAST 12 MONTHS, YOU WORRIED THAT YOUR FOOD WOULD RUN OUT BEFORE YOU GOT MONEY TO BUY MORE.: NEVER TRUE

## 2023-03-09 SDOH — ECONOMIC STABILITY: INCOME INSECURITY: HOW HARD IS IT FOR YOU TO PAY FOR THE VERY BASICS LIKE FOOD, HOUSING, MEDICAL CARE, AND HEATING?: NOT HARD AT ALL

## 2023-03-09 SDOH — ECONOMIC STABILITY: HOUSING INSECURITY
IN THE LAST 12 MONTHS, WAS THERE A TIME WHEN YOU DID NOT HAVE A STEADY PLACE TO SLEEP OR SLEPT IN A SHELTER (INCLUDING NOW)?: NO

## 2023-03-09 ASSESSMENT — PATIENT HEALTH QUESTIONNAIRE - PHQ9
9. THOUGHTS THAT YOU WOULD BE BETTER OFF DEAD, OR OF HURTING YOURSELF: 0
6. FEELING BAD ABOUT YOURSELF - OR THAT YOU ARE A FAILURE OR HAVE LET YOURSELF OR YOUR FAMILY DOWN: 2
SUM OF ALL RESPONSES TO PHQ QUESTIONS 1-9: 15
5. POOR APPETITE OR OVEREATING: 2
SUM OF ALL RESPONSES TO PHQ9 QUESTIONS 1 & 2: 4
7. TROUBLE CONCENTRATING ON THINGS, SUCH AS READING THE NEWSPAPER OR WATCHING TELEVISION: 2
3. TROUBLE FALLING OR STAYING ASLEEP: 2
10. IF YOU CHECKED OFF ANY PROBLEMS, HOW DIFFICULT HAVE THESE PROBLEMS MADE IT FOR YOU TO DO YOUR WORK, TAKE CARE OF THINGS AT HOME, OR GET ALONG WITH OTHER PEOPLE: 1
2. FEELING DOWN, DEPRESSED OR HOPELESS: 2
SUM OF ALL RESPONSES TO PHQ QUESTIONS 1-9: 15
8. MOVING OR SPEAKING SO SLOWLY THAT OTHER PEOPLE COULD HAVE NOTICED. OR THE OPPOSITE, BEING SO FIGETY OR RESTLESS THAT YOU HAVE BEEN MOVING AROUND A LOT MORE THAN USUAL: 0
1. LITTLE INTEREST OR PLEASURE IN DOING THINGS: 2
SUM OF ALL RESPONSES TO PHQ QUESTIONS 1-9: 15
SUM OF ALL RESPONSES TO PHQ QUESTIONS 1-9: 15
4. FEELING TIRED OR HAVING LITTLE ENERGY: 3

## 2023-03-09 NOTE — PROGRESS NOTES
Chief Complaint   Patient presents with    Ear Fullness     Pt is an acute visit with c/o bilat. Ear fullness, lethargy, and sore throat daily x3 days. Home Covid test negative yesterday. HPI:  Patient is here complaining of sore throat nasal congestion and bilateral aching ears worse on the left side for the last 3 to 4 days getting progressively worse. She has been trying Mucinex and Vicks over-the-counter without much help. She denies any fevers or any shortness of breath or any coughing. She had very mild chills and some body aches and some nausea and decreased appetite  On exam she has mild congestion of the bilateral sinuses with mild sore throat and mild irritation of the left tympanic membrane with some fluid buildup behind it. Also some very tiny cervical and periauricular lymph nodes were noted. .  She tested herself yesterday for COVID which was negative    Past Medical History, Surgical History, and Family History has been reviewed and updated.     Review of Systems:  Constitutional:  No fever, no fatigue, no chills, no headaches, no weight change  Dermatology:  No rash, no mole, no dry or sensitive skin  ENT:  No cough, no sore throat, no sinus pain, no runny nose, no ear pain  Cardiology:  No chest pain, no palpitations, no leg edema, no shortness of breath, no PND  Gastroenterology:  No dysphagia, no abdominal pain, no nausea, no vomiting, no constipation, no diarrhea, no heartburn  Musculoskeletal:  No joint pain, no leg cramps, no back pain, no muscle aches  Respiratory:  No shortness of breath, no orthopnea, no wheezing, no KHALIL, no hemoptysis  Urology:  No blood in the urine, no urinary frequency, no urinary incontinence, no urinary urgency, no nocturia, no dysuria    Vitals:    03/09/23 1243   BP: 128/78   Pulse: 62   Temp: 97.5 °F (36.4 °C)   TempSrc: Temporal   SpO2: 97%   Weight: 159 lb 4.8 oz (72.3 kg)   Height: 5' 3\" (1.6 m)       General:  Patient alert and oriented x 3, NAD, pleasant  HEENT:  Atraumatic, normocephalic, PERRLA, EOMI, clear conjunctiva, TMs clear on the right but with mild fluid buildup on the left, nose-clear drainage with mild congestion of the turbinates, throat -mild erythema  Neck:  Supple, no goiter, no carotid bruits, bilateral cervical and periauricular tiny LAD  Lungs: Few scattered harsh rhonchi no wheezing  Heart:  RRR, no murmurs, gallops or rubs  Abdomen:  Soft/nt/nd, + bowel sounds  Lymph node examination: unremarkable  Neurological exam : unremarkable  Extremities:  No clubbing, cyanosis or edema  Skin: unremarkable    Hemoglobin A1C   Date Value Ref Range Status   11/28/2022 5.6 4.0 - 5.6 % Final     Cholesterol, Total   Date Value Ref Range Status   03/05/2018 168 0 - 199 mg/dL Final     Triglycerides   Date Value Ref Range Status   03/05/2018 96 0 - 149 mg/dL Final     HDL   Date Value Ref Range Status   11/28/2022 52 >40 mg/dL Final     LDL Calculated   Date Value Ref Range Status   11/28/2022 100 (H) 0 - 99 mg/dL Final     VLDL Cholesterol Calculated   Date Value Ref Range Status   11/28/2022 35 mg/dL Final     Sodium   Date Value Ref Range Status   11/28/2022 140 132 - 146 mmol/L Final     Potassium   Date Value Ref Range Status   11/28/2022 4.0 3.5 - 5.0 mmol/L Final     Chloride   Date Value Ref Range Status   11/28/2022 104 98 - 107 mmol/L Final     CO2   Date Value Ref Range Status   11/28/2022 28 22 - 29 mmol/L Final     BUN   Date Value Ref Range Status   11/28/2022 10 6 - 20 mg/dL Final     Creatinine   Date Value Ref Range Status   11/28/2022 0.8 0.5 - 1.0 mg/dL Final     Glucose   Date Value Ref Range Status   11/28/2022 80 74 - 99 mg/dL Final     Calcium   Date Value Ref Range Status   11/28/2022 8.8 8.6 - 10.2 mg/dL Final     Total Protein   Date Value Ref Range Status   11/28/2022 6.5 6.4 - 8.3 g/dL Final     Albumin   Date Value Ref Range Status   11/28/2022 4.2 3.5 - 5.2 g/dL Final     Total Bilirubin   Date Value Ref Range Status 11/28/2022 0.2 0.0 - 1.2 mg/dL Final     Alkaline Phosphatase   Date Value Ref Range Status   11/28/2022 81 35 - 104 U/L Final     AST   Date Value Ref Range Status   11/28/2022 14 0 - 31 U/L Final     ALT   Date Value Ref Range Status   11/28/2022 15 0 - 32 U/L Final     Est, Glom Filt Rate   Date Value Ref Range Status   11/28/2022 >60 >=60 mL/min/1.73 Final     Comment:     Pediatric calculator link  [employer].[email or web address] [email or web address]_calculatorped  Effective Oct 3, 2022  These results are not intended for use in patients  <25years of age. eGFR results are calculated without  a race factor using the 2021 CKD-EPI equation. Careful  clinical correlation is recommended, particularly when  comparing to results calculated using previous equations. The CKD-EPI equation is less accurate in patients with  extremes of muscle mass, extra-renal metabolism of  creatinine, excessive creatinine ingestion, or following  therapy that affects renal tubular secretion. GFR    Date Value Ref Range Status   03/01/2022 >60  Final        No results found.      Assessment/Plan:      Outpatient Encounter Medications as of 3/9/2023   Medication Sig Dispense Refill    pregabalin (LYRICA) 150 MG capsule take 1 capsule by mouth three times a day      amoxicillin (AMOXIL) 500 MG capsule Take 1 capsule by mouth 3 times daily for 7 days 21 capsule 0    cetirizine (ZYRTEC) 10 MG tablet Take 1 tablet by mouth daily 30 tablet 0    ramelteon (ROZEREM) 8 MG tablet take 1 tablet by mouth at bedtime AVOID ADMINISTERING WITH HIGH FAT MEAL      ARIPiprazole (ABILIFY) 2 MG tablet take 1 tablet by mouth every evening with food      busPIRone (BUSPAR) 15 MG tablet take 1 tablet by mouth twice a day      Cholecalciferol (VITAMIN D3) 50 MCG (2000 UT) CAPS Take 1 capsule by mouth daily      vitamin B-12 (CYANOCOBALAMIN) 500 MCG tablet Take 500 mcg by mouth daily      Black Cohosh 540 MG CAPS Take 1 each by mouth daily    cloNIDine (CATAPRES) 0.1 MG tablet take 1 tablet by mouth at bedtime if needed      diclofenac sodium (VOLTAREN) 1 % GEL apply 4 grams to affected area four times a day DO NOT APPLY MORE...  (REFER TO PRESCRIPTION NOTES).      RA SENNA 8.6 MG tablet take 2 tablets by mouth at bedtime      GAVILAX 17 GM/SCOOP powder take 17GM (DISSOLVED IN WATER) by mouth once daily      meloxicam (MOBIC) 15 MG tablet Take 1 tablet by mouth daily 90 tablet 1    Probiotic Product (PROBIOTIC ADVANCED PO) Take 1 tablet by mouth daily      hydrOXYzine (ATARAX) 25 MG tablet Take 25 mg by mouth 2 times daily as needed for Anxiety      DULoxetine (CYMBALTA) 60 MG extended release capsule Take 60 mg by mouth 2 times daily       traMADol (ULTRAM) 50 MG tablet take 1 tablet by mouth twice a day if needed      tiZANidine (ZANAFLEX) 4 MG tablet Take 4 mg by mouth three times daily Pt takes two tabs TID      [DISCONTINUED] gabapentin (NEURONTIN) 400 MG capsule Take 1,200 mg by mouth 3 times daily.        No facility-administered encounter medications on file as of 3/9/2023.        Gualberto was seen today for ear fullness.    Diagnoses and all orders for this visit:    Acute pharyngitis due to other specified organisms    Left non-suppurative otitis media    Other orders  -     amoxicillin (AMOXIL) 500 MG capsule; Take 1 capsule by mouth 3 times daily for 7 days  -     cetirizine (ZYRTEC) 10 MG tablet; Take 1 tablet by mouth daily       There are no Patient Instructions on file for this visit.     On this date 3/9/2023 I have spent 25 minutes reviewing previous notes, test results and face to face with the patient discussing the diagnosis and importance of compliance with the treatment plan as well as documenting on the day of the visit.      Fidelina Lam MD   3/9/23

## 2023-03-23 ENCOUNTER — TELEPHONE (OUTPATIENT)
Dept: PRIMARY CARE CLINIC | Age: 48
End: 2023-03-23

## 2023-03-23 NOTE — TELEPHONE ENCOUNTER
Patient states still having ear pain and voice comes and goes    Finished antibiotic couple days ago

## 2023-03-27 ENCOUNTER — OFFICE VISIT (OUTPATIENT)
Dept: PRIMARY CARE CLINIC | Age: 48
End: 2023-03-27
Payer: COMMERCIAL

## 2023-03-27 VITALS
TEMPERATURE: 97 F | OXYGEN SATURATION: 98 % | HEART RATE: 62 BPM | DIASTOLIC BLOOD PRESSURE: 74 MMHG | BODY MASS INDEX: 28.35 KG/M2 | SYSTOLIC BLOOD PRESSURE: 128 MMHG | HEIGHT: 63 IN | WEIGHT: 160 LBS

## 2023-03-27 DIAGNOSIS — M79.18 MYOFASCIAL PAIN SYNDROME: Primary | Chronic | ICD-10-CM

## 2023-03-27 DIAGNOSIS — M62.838 NECK MUSCLE SPASM: ICD-10-CM

## 2023-03-27 DIAGNOSIS — F32.4 MAJOR DEPRESSIVE DISORDER WITH SINGLE EPISODE, IN PARTIAL REMISSION (HCC): ICD-10-CM

## 2023-03-27 PROCEDURE — G8484 FLU IMMUNIZE NO ADMIN: HCPCS | Performed by: INTERNAL MEDICINE

## 2023-03-27 PROCEDURE — 99213 OFFICE O/P EST LOW 20 MIN: CPT | Performed by: INTERNAL MEDICINE

## 2023-03-27 PROCEDURE — 1036F TOBACCO NON-USER: CPT | Performed by: INTERNAL MEDICINE

## 2023-03-27 PROCEDURE — G8419 CALC BMI OUT NRM PARAM NOF/U: HCPCS | Performed by: INTERNAL MEDICINE

## 2023-03-27 PROCEDURE — G8427 DOCREV CUR MEDS BY ELIG CLIN: HCPCS | Performed by: INTERNAL MEDICINE

## 2023-04-26 ENCOUNTER — OFFICE VISIT (OUTPATIENT)
Dept: PRIMARY CARE CLINIC | Age: 48
End: 2023-04-26
Payer: COMMERCIAL

## 2023-04-26 VITALS
TEMPERATURE: 97.5 F | HEART RATE: 73 BPM | DIASTOLIC BLOOD PRESSURE: 76 MMHG | WEIGHT: 160.2 LBS | BODY MASS INDEX: 28.39 KG/M2 | OXYGEN SATURATION: 98 % | SYSTOLIC BLOOD PRESSURE: 130 MMHG | HEIGHT: 63 IN

## 2023-04-26 DIAGNOSIS — G89.29 CHRONIC MIDLINE LOW BACK PAIN, UNSPECIFIED WHETHER SCIATICA PRESENT: ICD-10-CM

## 2023-04-26 DIAGNOSIS — M54.50 CHRONIC MIDLINE LOW BACK PAIN, UNSPECIFIED WHETHER SCIATICA PRESENT: ICD-10-CM

## 2023-04-26 DIAGNOSIS — M99.51 INTERVERTEBRAL DISC STENOSIS OF NEURAL CANAL OF CERVICAL REGION: ICD-10-CM

## 2023-04-26 DIAGNOSIS — R51.9 CHRONIC DAILY HEADACHE: ICD-10-CM

## 2023-04-26 DIAGNOSIS — K58.9 IRRITABLE BOWEL SYNDROME, UNSPECIFIED TYPE: ICD-10-CM

## 2023-04-26 DIAGNOSIS — M79.7 FIBROMYALGIA: Primary | Chronic | ICD-10-CM

## 2023-04-26 DIAGNOSIS — M96.1 LUMBAR POSTLAMINECTOMY SYNDROME: ICD-10-CM

## 2023-04-26 PROCEDURE — G8419 CALC BMI OUT NRM PARAM NOF/U: HCPCS | Performed by: INTERNAL MEDICINE

## 2023-04-26 PROCEDURE — 1036F TOBACCO NON-USER: CPT | Performed by: INTERNAL MEDICINE

## 2023-04-26 PROCEDURE — 99213 OFFICE O/P EST LOW 20 MIN: CPT | Performed by: INTERNAL MEDICINE

## 2023-04-26 PROCEDURE — G8427 DOCREV CUR MEDS BY ELIG CLIN: HCPCS | Performed by: INTERNAL MEDICINE

## 2023-04-26 NOTE — PROGRESS NOTES
Chief Complaint   Patient presents with    Other     Pt is here for a referral to Rheumatology as her Pain Management  Is retiring and suggested this. HPI:  Patient is here for follow-up of fibromyalgia and depression. .  Patient to be under the care of pain clinic physician for management of the fibromyalgia but unfortunately she is retiring and she is in need for a new rheumatologist she has been diagnosed with fibromyalgia for few years and has been on multiple medications for that. We will refer her to rheumatology. Patient complains of shortness on the big toe on the left foot but on exam there is no skin changes there is no tenderness and there is no open lesions or ulceration. This is most probably secondary to her neuropathy from her back surgery. She has been to gynecology and was evaluated for menopause and had her [address] and TSH checked and they were within normal limits. .    Past Medical History, Surgical History, and Family History has been reviewed and updated.     Review of Systems:  Constitutional:  No fever, no fatigue, no chills, no headaches, no weight change  Dermatology:  No rash, no mole, no dry or sensitive skin  ENT:  No cough, no sore throat, no sinus pain, no runny nose, no ear pain  Cardiology:  No chest pain, no palpitations, no leg edema, no shortness of breath, no PND  Gastroenterology:  No dysphagia, no abdominal pain, no nausea, no vomiting, no constipation, no diarrhea, no heartburn  Musculoskeletal:  No joint pain, no leg cramps, no back pain, no muscle aches  Respiratory:  No shortness of breath, no orthopnea, no wheezing, no KHALIL, no hemoptysis  Urology:  No blood in the urine, no urinary frequency, no urinary incontinence, no urinary urgency, no nocturia, no dysuria    Vitals:    04/26/23 0918   BP: 130/76   Pulse: 73   Temp: 97.5 °F (36.4 °C)   TempSrc: Temporal   SpO2: 98%   Weight: 160 lb 3.2 oz (72.7 kg)   Height: 5' 3\" (1.6 m)       General:  Patient alert and

## 2023-07-31 ENCOUNTER — HOSPITAL ENCOUNTER (EMERGENCY)
Age: 48
Discharge: HOME | End: 2023-07-31
Payer: COMMERCIAL

## 2023-07-31 VITALS
HEART RATE: 68 BPM | OXYGEN SATURATION: 100 % | TEMPERATURE: 97.9 F | RESPIRATION RATE: 20 BRPM | DIASTOLIC BLOOD PRESSURE: 78 MMHG | HEIGHT: 63 IN | BODY MASS INDEX: 28.88 KG/M2 | WEIGHT: 163 LBS | SYSTOLIC BLOOD PRESSURE: 143 MMHG

## 2023-07-31 DIAGNOSIS — S39.012A BACK STRAIN, INITIAL ENCOUNTER: Primary | ICD-10-CM

## 2023-07-31 PROCEDURE — 99211 OFF/OP EST MAY X REQ PHY/QHP: CPT

## 2023-07-31 PROCEDURE — 6360000002 HC RX W HCPCS: Performed by: NURSE PRACTITIONER

## 2023-07-31 PROCEDURE — 96372 THER/PROPH/DIAG INJ SC/IM: CPT

## 2023-07-31 RX ORDER — MENTHOL 40 MG/ML
GEL TOPICAL
Qty: 1 EACH | Refills: 0 | Status: SHIPPED | OUTPATIENT
Start: 2023-07-31

## 2023-07-31 RX ORDER — NAPROXEN 500 MG/1
500 TABLET ORAL 2 TIMES DAILY PRN
Qty: 14 TABLET | Refills: 0 | Status: SHIPPED | OUTPATIENT
Start: 2023-07-31 | End: 2023-08-07

## 2023-07-31 RX ORDER — KETOROLAC TROMETHAMINE 30 MG/ML
30 INJECTION, SOLUTION INTRAMUSCULAR; INTRAVENOUS ONCE
Status: COMPLETED | OUTPATIENT
Start: 2023-07-31 | End: 2023-07-31

## 2023-07-31 RX ORDER — CYCLOBENZAPRINE HCL 10 MG
10 TABLET ORAL 2 TIMES DAILY PRN
Qty: 14 TABLET | Refills: 0 | Status: SHIPPED | OUTPATIENT
Start: 2023-07-31 | End: 2023-08-07

## 2023-07-31 RX ADMIN — KETOROLAC TROMETHAMINE 30 MG: 30 INJECTION, SOLUTION INTRAMUSCULAR; INTRAVENOUS at 17:17

## 2023-07-31 ASSESSMENT — PAIN DESCRIPTION - LOCATION: LOCATION: RIB CAGE

## 2023-07-31 ASSESSMENT — PAIN SCALES - GENERAL: PAINLEVEL_OUTOF10: 7

## 2023-07-31 ASSESSMENT — PAIN DESCRIPTION - DESCRIPTORS: DESCRIPTORS: ACHING;DISCOMFORT

## 2023-07-31 ASSESSMENT — PAIN DESCRIPTION - ORIENTATION: ORIENTATION: LEFT

## 2023-07-31 ASSESSMENT — PAIN - FUNCTIONAL ASSESSMENT: PAIN_FUNCTIONAL_ASSESSMENT: 0-10

## 2023-07-31 NOTE — ED PROVIDER NOTES
Department of Emergency [facility] [address] [place] [address]  Provider Note  Admit Date/RoomTime: 2023  4:54 PM  Room:   NAME: Sarah Maradiaga  : 1975  MRN: [id number]     Chief Complaint:  Rib Pain (injury) (Lower left-no injury )    History of Present Illness       Sarah Maradiaga is a 46 y.o. old female with a prior history of recurrent intermittent self limited episodes of low back pain, presents to the urgent care center by private vehicle, for left flank pain. She said it is worse when she moves and changes positions she had to get in and out of her car a lot today and that flared it up. She said she has a history of fibromyalgia. She said last week she was having problems with sciatica on the left side it was in the left hip area radiating down the left leg and now today it is in the left flank area. She is denying any urinary symptoms denying any chest pain or shortness of breath. There was no injury. She does not have any fever or urinary symptoms numbness or tingling or weakness in her legs or any other complaints. She works as a  and has to do a lot of cleaning on her hands and knees and she said she feels that is aggravating it      ROS   Pertinent positives and negatives are stated within HPI, all other systems reviewed and are negative. Past Medical History:  has a past medical history of Anemia, Anxiety, Arthritis, Asthma, Chronic back pain, DDD (degenerative disc disease), Depression, Fibromyalgia, Fibromyalgia, IBS (irritable bowel syndrome), Migraines, Pain, and PTSD (post-traumatic stress disorder). Surgical History:  has a past surgical history that includes Tonsillectomy; knee surgery; shoulder surgery; Abdominal exploration surgery; other surgical history; other surgical history (1/10/13); Spine surgery (1/10/13); Nerve Block (Bilateral, 10 29 13); Nerve Block (13); Abdomen surgery; Colonoscopy;  Hysterectomy; and

## 2023-08-03 ENCOUNTER — OFFICE VISIT (OUTPATIENT)
Dept: PRIMARY CARE CLINIC | Age: 48
End: 2023-08-03
Payer: COMMERCIAL

## 2023-08-03 VITALS
TEMPERATURE: 97.2 F | DIASTOLIC BLOOD PRESSURE: 78 MMHG | SYSTOLIC BLOOD PRESSURE: 128 MMHG | OXYGEN SATURATION: 99 % | WEIGHT: 166.3 LBS | BODY MASS INDEX: 29.46 KG/M2 | HEART RATE: 80 BPM | HEIGHT: 63 IN

## 2023-08-03 DIAGNOSIS — M62.830 BACK MUSCLE SPASM: ICD-10-CM

## 2023-08-03 DIAGNOSIS — F32.1 CURRENT MODERATE EPISODE OF MAJOR DEPRESSIVE DISORDER WITHOUT PRIOR EPISODE (HCC): ICD-10-CM

## 2023-08-03 DIAGNOSIS — M54.50 CHRONIC MIDLINE LOW BACK PAIN, UNSPECIFIED WHETHER SCIATICA PRESENT: Primary | ICD-10-CM

## 2023-08-03 DIAGNOSIS — G89.29 CHRONIC MIDLINE LOW BACK PAIN, UNSPECIFIED WHETHER SCIATICA PRESENT: Primary | ICD-10-CM

## 2023-08-03 DIAGNOSIS — M79.7 FIBROMYALGIA: Chronic | ICD-10-CM

## 2023-08-03 PROCEDURE — 3017F COLORECTAL CA SCREEN DOC REV: CPT | Performed by: INTERNAL MEDICINE

## 2023-08-03 PROCEDURE — G8419 CALC BMI OUT NRM PARAM NOF/U: HCPCS | Performed by: INTERNAL MEDICINE

## 2023-08-03 PROCEDURE — 1036F TOBACCO NON-USER: CPT | Performed by: INTERNAL MEDICINE

## 2023-08-03 PROCEDURE — 99213 OFFICE O/P EST LOW 20 MIN: CPT | Performed by: INTERNAL MEDICINE

## 2023-08-03 PROCEDURE — G8427 DOCREV CUR MEDS BY ELIG CLIN: HCPCS | Performed by: INTERNAL MEDICINE

## 2023-08-03 RX ORDER — ARIPIPRAZOLE 5 MG/1
5 TABLET ORAL NIGHTLY
COMMUNITY
Start: 2023-07-27

## 2023-08-03 NOTE — PROGRESS NOTES
bedtime AVOID ADMINISTERING WITH HIGH FAT MEAL      busPIRone (BUSPAR) 15 MG tablet take 1 tablet by mouth twice a day      Cholecalciferol (VITAMIN D3) 50 MCG (2000 UT) CAPS Take 1 capsule by mouth daily      vitamin B-12 (CYANOCOBALAMIN) 500 MCG tablet Take 1 tablet by mouth daily      Black Cohosh 540 MG CAPS Take 1 each by mouth daily      RA SENNA 8.6 MG tablet take 2 tablets by mouth at bedtime      GAVILAX 17 GM/SCOOP powder take 17GM (DISSOLVED IN WATER) by mouth once daily      Probiotic Product (PROBIOTIC ADVANCED PO) Take 1 tablet by mouth daily      hydrOXYzine (ATARAX) 25 MG tablet Take 1 tablet by mouth 2 times daily as needed for Anxiety      DULoxetine (CYMBALTA) 60 MG extended release capsule Take 1 capsule by mouth 2 times daily      traMADol (ULTRAM) 50 MG tablet take 1 tablet by mouth twice a day if needed      tiZANidine (ZANAFLEX) 4 MG tablet Take 1 tablet by mouth three times daily Pt takes two tabs TID      [DISCONTINUED] ARIPiprazole (ABILIFY) 2 MG tablet take 1 tablet by mouth every evening with food       No facility-administered encounter medications on file as of 8/3/2023. Reanna Salomon was seen today for follow-up. Diagnoses and all orders for this visit:    Chronic midline low back pain, unspecified whether sciatica present    Back muscle spasm    Current moderate episode of major depressive disorder without prior episode ([address])    Fibromyalgia         There are no Patient Instructions on file for this visit. On this date 8/3/2023 I have spent 28 minutes reviewing previous notes, test results and face to face with the patient discussing the diagnosis and importance of compliance with the treatment plan as well as documenting on the day of the visit.       Juancho Lopez MD   8/3/23

## 2023-08-31 ENCOUNTER — OFFICE VISIT (OUTPATIENT)
Dept: PRIMARY CARE CLINIC | Age: 48
End: 2023-08-31
Payer: COMMERCIAL

## 2023-08-31 VITALS
HEART RATE: 95 BPM | WEIGHT: 168.6 LBS | OXYGEN SATURATION: 98 % | SYSTOLIC BLOOD PRESSURE: 110 MMHG | RESPIRATION RATE: 16 BRPM | HEIGHT: 63 IN | DIASTOLIC BLOOD PRESSURE: 68 MMHG | TEMPERATURE: 98.3 F | BODY MASS INDEX: 29.88 KG/M2

## 2023-08-31 DIAGNOSIS — F41.9 ANXIETY: Primary | ICD-10-CM

## 2023-08-31 PROCEDURE — 3017F COLORECTAL CA SCREEN DOC REV: CPT | Performed by: STUDENT IN AN ORGANIZED HEALTH CARE EDUCATION/TRAINING PROGRAM

## 2023-08-31 PROCEDURE — 1036F TOBACCO NON-USER: CPT | Performed by: STUDENT IN AN ORGANIZED HEALTH CARE EDUCATION/TRAINING PROGRAM

## 2023-08-31 PROCEDURE — 99212 OFFICE O/P EST SF 10 MIN: CPT | Performed by: STUDENT IN AN ORGANIZED HEALTH CARE EDUCATION/TRAINING PROGRAM

## 2023-08-31 PROCEDURE — G8419 CALC BMI OUT NRM PARAM NOF/U: HCPCS | Performed by: STUDENT IN AN ORGANIZED HEALTH CARE EDUCATION/TRAINING PROGRAM

## 2023-08-31 PROCEDURE — G8427 DOCREV CUR MEDS BY ELIG CLIN: HCPCS | Performed by: STUDENT IN AN ORGANIZED HEALTH CARE EDUCATION/TRAINING PROGRAM

## 2023-08-31 RX ORDER — HYDROXYZINE 50 MG/1
50 TABLET, FILM COATED ORAL EVERY 8 HOURS PRN
Qty: 30 TABLET | Refills: 0
Start: 2023-08-31 | End: 2023-09-10

## 2023-08-31 ASSESSMENT — ENCOUNTER SYMPTOMS
RESPIRATORY NEGATIVE: 1
DIARRHEA: 1

## 2023-08-31 NOTE — PROGRESS NOTES
Scarlett Suh (:  1975) is a 46 y.o. female,Established patient, here for evaluation of the following chief complaint(s): Anxiety (Has been getting worse and unable to get legs to calm down, very irritable, x 1 week)         ASSESSMENT/PLAN:  1. Anxiety  -     hydrOXYzine HCl (ATARAX) 50 MG tablet; Take 1 tablet by mouth every 8 hours as needed for Itching or Anxiety, Disp-30 tablet, R-0NO PRINT      No follow-ups on file. Will have her increase atarax to 50 mg TID and try OTC magnesium; she is awaiting a call back from [address] [place] [address] St:  HPI    Patient is a 45 y/o F with a PMHx of anxiety, fibromyalgia and chronic back pain who presents for acute visit for anxiety. She is a patient of Dr. Lorene Croft. She follows with psychiatry and is on buspar 15 mg BID, abilify 5 mg at night, ramelteon 8 mg at night, cymbalta 60 mg BID and atarax 25 mg BID; [facility] and did call them yesterday and has not heard     She reports that for the past week, she had been more irritable and restless; her daughter got  last week but denies any other stressors; she reports that she has taken atarax TID and this helped take the edge off      Review of Systems   Constitutional: Negative. HENT: Negative. Respiratory: Negative. Cardiovascular: Negative. Gastrointestinal:  Positive for diarrhea. Psychiatric/Behavioral:  Positive for agitation. The patient is nervous/anxious. Objective   Physical Exam  Vitals reviewed. Constitutional:       General: She is not in acute distress. HENT:      Head: Normocephalic and atraumatic. Eyes:      General:         Right eye: No discharge. Left eye: No discharge. Cardiovascular:      Rate and Rhythm: Normal rate and regular rhythm. Pulses: Normal pulses. Heart sounds: Normal heart sounds. Neurological:      Mental Status: She is alert.    Psychiatric:         Mood

## 2023-09-14 ENCOUNTER — HOSPITAL ENCOUNTER (EMERGENCY)
Age: 48
Discharge: HOME | End: 2023-09-14
Attending: EMERGENCY MEDICINE
Payer: COMMERCIAL

## 2023-09-14 ENCOUNTER — OFFICE VISIT (OUTPATIENT)
Dept: PRIMARY CARE CLINIC | Age: 48
End: 2023-09-14

## 2023-09-14 VITALS
BODY MASS INDEX: 30.65 KG/M2 | HEIGHT: 63 IN | SYSTOLIC BLOOD PRESSURE: 138 MMHG | DIASTOLIC BLOOD PRESSURE: 84 MMHG | OXYGEN SATURATION: 93 % | HEART RATE: 64 BPM | WEIGHT: 173 LBS | TEMPERATURE: 97.1 F

## 2023-09-14 VITALS
HEART RATE: 73 BPM | RESPIRATION RATE: 18 BRPM | HEIGHT: 64 IN | BODY MASS INDEX: 29.53 KG/M2 | DIASTOLIC BLOOD PRESSURE: 75 MMHG | OXYGEN SATURATION: 93 % | TEMPERATURE: 97.5 F | WEIGHT: 173 LBS | SYSTOLIC BLOOD PRESSURE: 116 MMHG

## 2023-09-14 DIAGNOSIS — F41.1 ANXIETY STATE: Primary | ICD-10-CM

## 2023-09-14 DIAGNOSIS — F33.2 SEVERE EPISODE OF RECURRENT MAJOR DEPRESSIVE DISORDER, WITHOUT PSYCHOTIC FEATURES (HCC): ICD-10-CM

## 2023-09-14 DIAGNOSIS — F41.0 PANIC ATTACK AS REACTION TO STRESS: Primary | ICD-10-CM

## 2023-09-14 DIAGNOSIS — Z76.89 ENCOUNTER FOR PSYCHIATRIC ASSESSMENT: ICD-10-CM

## 2023-09-14 DIAGNOSIS — F43.0 PANIC ATTACK AS REACTION TO STRESS: Primary | ICD-10-CM

## 2023-09-14 DIAGNOSIS — F41.9 SEVERE ANXIETY: ICD-10-CM

## 2023-09-14 LAB
ALBUMIN SERPL-MCNC: 4.4 G/DL (ref 3.5–5.2)
ALP SERPL-CCNC: 98 U/L (ref 35–104)
ALT SERPL-CCNC: 15 U/L (ref 0–32)
AMPHET UR QL SCN: NEGATIVE
ANION GAP SERPL CALCULATED.3IONS-SCNC: 6 MMOL/L (ref 7–16)
APAP SERPL-MCNC: <5 UG/ML (ref 10–30)
AST SERPL-CCNC: 17 U/L (ref 0–31)
BACTERIA URNS QL MICRO: ABNORMAL
BARBITURATES UR QL SCN: NEGATIVE
BASOPHILS # BLD: 0.05 K/UL (ref 0–0.2)
BASOPHILS NFR BLD: 0 % (ref 0–2)
BENZODIAZ UR QL: NEGATIVE
BILIRUB SERPL-MCNC: 0.2 MG/DL (ref 0–1.2)
BILIRUB UR QL STRIP: NEGATIVE
BUN SERPL-MCNC: 15 MG/DL (ref 6–20)
BUPRENORPHINE UR QL: NEGATIVE
CALCIUM SERPL-MCNC: 9.1 MG/DL (ref 8.6–10.2)
CANNABINOIDS UR QL SCN: NEGATIVE
CHLORIDE SERPL-SCNC: 100 MMOL/L (ref 98–107)
CLARITY UR: ABNORMAL
CO2 SERPL-SCNC: 28 MMOL/L (ref 22–29)
COCAINE UR QL SCN: NEGATIVE
COLOR UR: YELLOW
CREAT SERPL-MCNC: 0.9 MG/DL (ref 0.5–1)
EOSINOPHIL # BLD: 0.1 K/UL (ref 0.05–0.5)
EOSINOPHILS RELATIVE PERCENT: 1 % (ref 0–6)
EPI CELLS #/AREA URNS HPF: ABNORMAL /HPF
ERYTHROCYTE [DISTWIDTH] IN BLOOD BY AUTOMATED COUNT: 13 % (ref 11.5–15)
ETHANOLAMINE SERPL-MCNC: <10 MG/DL
FENTANYL UR QL: NEGATIVE
GFR SERPL CREATININE-BSD FRML MDRD: >60 ML/MIN/1.73M2
GLUCOSE SERPL-MCNC: 88 MG/DL (ref 74–99)
GLUCOSE UR STRIP-MCNC: NEGATIVE MG/DL
HCT VFR BLD AUTO: 41 % (ref 34–48)
HGB BLD-MCNC: 13.1 G/DL (ref 11.5–15.5)
HGB UR QL STRIP.AUTO: NEGATIVE
IMM GRANULOCYTES # BLD AUTO: 0.06 K/UL (ref 0–0.58)
IMM GRANULOCYTES NFR BLD: 1 % (ref 0–5)
KETONES UR STRIP-MCNC: NEGATIVE MG/DL
LEUKOCYTE ESTERASE UR QL STRIP: ABNORMAL
LYMPHOCYTES NFR BLD: 1.54 K/UL (ref 1.5–4)
LYMPHOCYTES RELATIVE PERCENT: 14 % (ref 20–42)
MCH RBC QN AUTO: 28.4 PG (ref 26–35)
MCHC RBC AUTO-ENTMCNC: 32 G/DL (ref 32–34.5)
MCV RBC AUTO: 88.9 FL (ref 80–99.9)
METHADONE UR QL: NEGATIVE
MONOCYTES NFR BLD: 0.48 K/UL (ref 0.1–0.95)
MONOCYTES NFR BLD: 4 % (ref 2–12)
NEUTROPHILS NFR BLD: 80 % (ref 43–80)
NEUTS SEG NFR BLD: 9.1 K/UL (ref 1.8–7.3)
NITRITE UR QL STRIP: NEGATIVE
OPIATES UR QL SCN: NEGATIVE
OXYCODONE UR QL SCN: NEGATIVE
PCP UR QL SCN: NEGATIVE
PH UR STRIP: 6 [PH] (ref 5–9)
PLATELET # BLD AUTO: 228 K/UL (ref 130–450)
PMV BLD AUTO: 10.6 FL (ref 7–12)
POTASSIUM SERPL-SCNC: 4.3 MMOL/L (ref 3.5–5)
PROT SERPL-MCNC: 7 G/DL (ref 6.4–8.3)
PROT UR STRIP-MCNC: NEGATIVE MG/DL
RBC # BLD AUTO: 4.61 M/UL (ref 3.5–5.5)
RBC #/AREA URNS HPF: ABNORMAL /HPF
SALICYLATES SERPL-MCNC: <0.3 MG/DL (ref 0–30)
SODIUM SERPL-SCNC: 134 MMOL/L (ref 132–146)
SP GR UR STRIP: <1.005 (ref 1–1.03)
TEST INFORMATION: NORMAL
TOXIC TRICYCLIC SC,BLOOD: NEGATIVE
UROBILINOGEN UR STRIP-ACNC: 0.2 EU/DL (ref 0–1)
WBC #/AREA URNS HPF: ABNORMAL /HPF
WBC OTHER # BLD: 11.3 K/UL (ref 4.5–11.5)

## 2023-09-14 PROCEDURE — 80053 COMPREHEN METABOLIC PANEL: CPT

## 2023-09-14 PROCEDURE — 80179 DRUG ASSAY SALICYLATE: CPT

## 2023-09-14 PROCEDURE — 99284 EMERGENCY DEPT VISIT MOD MDM: CPT

## 2023-09-14 PROCEDURE — 81001 URINALYSIS AUTO W/SCOPE: CPT

## 2023-09-14 PROCEDURE — 6370000000 HC RX 637 (ALT 250 FOR IP): Performed by: EMERGENCY MEDICINE

## 2023-09-14 PROCEDURE — G0480 DRUG TEST DEF 1-7 CLASSES: HCPCS

## 2023-09-14 PROCEDURE — 93005 ELECTROCARDIOGRAM TRACING: CPT | Performed by: STUDENT IN AN ORGANIZED HEALTH CARE EDUCATION/TRAINING PROGRAM

## 2023-09-14 PROCEDURE — 80307 DRUG TEST PRSMV CHEM ANLYZR: CPT

## 2023-09-14 PROCEDURE — 80143 DRUG ASSAY ACETAMINOPHEN: CPT

## 2023-09-14 PROCEDURE — 85025 COMPLETE CBC W/AUTO DIFF WBC: CPT

## 2023-09-14 RX ORDER — CLONIDINE HYDROCHLORIDE 0.1 MG/1
0.1 TABLET ORAL ONCE
Status: COMPLETED | OUTPATIENT
Start: 2023-09-14 | End: 2023-09-14

## 2023-09-14 RX ORDER — HYDROXYZINE PAMOATE 25 MG/1
25 CAPSULE ORAL 3 TIMES DAILY PRN
Status: DISCONTINUED | OUTPATIENT
Start: 2023-09-14 | End: 2023-09-14 | Stop reason: HOSPADM

## 2023-09-14 RX ORDER — LORAZEPAM 1 MG/1
1 TABLET ORAL EVERY 6 HOURS PRN
Qty: 12 TABLET | Refills: 0 | Status: SHIPPED | OUTPATIENT
Start: 2023-09-14 | End: 2023-09-17

## 2023-09-14 RX ORDER — BUSPIRONE HYDROCHLORIDE 10 MG/1
20 TABLET ORAL 2 TIMES DAILY
COMMUNITY
Start: 2023-09-05

## 2023-09-14 RX ADMIN — CLONIDINE HYDROCHLORIDE 0.1 MG: 0.1 TABLET ORAL at 14:15

## 2023-09-14 RX ADMIN — HYDROXYZINE PAMOATE 25 MG: 25 CAPSULE ORAL at 17:26

## 2023-09-14 NOTE — ED PROVIDER NOTES
HISTORY      epidurals    OTHER SURGICAL HISTORY  1/10/13    plif L4-5, L5-S1 with cages rods and screws    SHOULDER SURGERY      right    SPINE SURGERY  1/10/13    l4-5 & s1    TONSILLECTOMY         CURRENTMEDICATIONS       Discharge Medication List as of 9/14/2023  6:30 PM        CONTINUE these medications which have NOT CHANGED    Details   busPIRone (BUSPAR) 10 MG tablet Take 2 tablets by mouth 2 times dailyHistorical Med      ARIPiprazole (ABILIFY) 5 MG tablet Take 1.5 tablets by mouth nightly Taking 1.5 tablets at nightHistorical Med      naproxen (NAPROSYN) 500 MG tablet Take 1 tablet by mouth 2 times daily as needed for Pain, Disp-14 tablet, R-0Normal      Menthol, Topical Analgesic, (BIOFREEZE) 4 % GEL Apply to painful areas on back bid prn, Disp-1 each, R-0Normal      pregabalin (LYRICA) 150 MG capsule take 1 capsule by mouth three times a dayHistorical Med      ramelteon (ROZEREM) 8 MG tablet take 1 tablet by mouth at bedtime AVOID ADMINISTERING WITH HIGH FAT MEALHistorical Med      Cholecalciferol (VITAMIN D3) 50 MCG (2000 UT) CAPS Take 1 capsule by mouth dailyHistorical Med      vitamin B-12 (CYANOCOBALAMIN) 500 MCG tablet Take 1 tablet by mouth dailyHistorical Med      Black Cohosh 540 MG CAPS Take 1 each by mouth dailyHistorical Med      RA SENNA 8.6 MG tablet take 2 tablets by mouth at bedtime, DAWHistorical Med      GAVILAX 17 GM/SCOOP powder take 17GM (DISSOLVED IN WATER) by mouth once daily, DAWHistorical Med      Probiotic Product (PROBIOTIC ADVANCED PO) Take 1 tablet by mouth dailyHistorical Med      hydrOXYzine (ATARAX) 25 MG tablet Take 1 tablet by mouth 2 times daily as needed for AnxietyHistorical Med      DULoxetine (CYMBALTA) 60 MG extended release capsule Take 1 capsule by mouth 2 times dailyHistorical Med      traMADol (ULTRAM) 50 MG tablet take 1 tablet by mouth twice a day if neededHistorical Med      tiZANidine (ZANAFLEX) 4 MG tablet Take 1 tablet by mouth three times daily Pt takes assigned to [address] [place] [address]. I have personally performed and/or participated in the history, exam, medical decision making, and procedures and agree with all pertinent clinical information. I have reviewed my findings and recommendations with [facility] and members of family present at the time of disposition. I have personally reviewed all laboratory radiology results from today's visit. I have personally reviewed and agree with resident interpretation of EKG for all EKGs from today's visit. I, Dr. Manav Hart am the primary provider of record         My findings/plan: The primary encounter diagnosis was Anxiety state. A diagnosis of Encounter for psychiatric assessment was also pertinent to this visit. Discharge Medication List as of 9/14/2023  6:30 PM        START taking these medications    Details   LORazepam (ATIVAN) 1 MG tablet Take 1 tablet by mouth every 6 hours as needed for Anxiety for up to 3 days.  Max Daily Amount: 4 mg, Disp-12 tablet, R-0Normal           Donalee Dakins, DO Donalee Dakins, DO  09/14/23 2048
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
## 2023-09-14 NOTE — DISCHARGE INSTRUCTIONS
Follow up appointment:       [facility]  Intensive Outpatient treatment- Group therapy 3 days a week   Monday, Tuesday, Thursday  8am-12:15pm   9 to 12 hours a week     [facility] [phone]  Before arriving to program please go to the registration office on the 1st floor  Once registration is complete take elevator B to the 7th floor  Check in at the intake office on [facility]   If you are unable to attend on the date listed above please contact the department to reschedule your intake.    Call as needed with any additional questions related to discharge instructions
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
## 2023-09-14 NOTE — ED NOTES
Stuart Guzamn from [facility] states that chemistry ran the urine drug screen but did not send urine over to [facility] for urinalysis to be run. Stuart Guzman states she will run UA now.       Desi Ricci RN  09/14/23 8500

## 2023-09-16 LAB
EKG ATRIAL RATE: 67 BPM
EKG P AXIS: 72 DEGREES
EKG P-R INTERVAL: 116 MS
EKG Q-T INTERVAL: 388 MS
EKG QRS DURATION: 84 MS
EKG QTC CALCULATION (BAZETT): 409 MS
EKG R AXIS: 47 DEGREES
EKG T AXIS: 35 DEGREES
EKG VENTRICULAR RATE: 67 BPM

## 2023-09-16 PROCEDURE — 93010 ELECTROCARDIOGRAM REPORT: CPT | Performed by: INTERNAL MEDICINE

## 2023-09-25 ENCOUNTER — HOSPITAL ENCOUNTER (OUTPATIENT)
Dept: PSYCHIATRY | Age: 48
Setting detail: THERAPIES SERIES
Discharge: HOME | End: 2023-09-25
Payer: COMMERCIAL

## 2023-09-25 PROCEDURE — 90791 PSYCH DIAGNOSTIC EVALUATION: CPT

## 2023-09-25 RX ORDER — LORAZEPAM 1 MG/1
1 TABLET ORAL EVERY 6 HOURS PRN
COMMUNITY

## 2023-09-25 ASSESSMENT — SLEEP AND FATIGUE QUESTIONNAIRES
AVERAGE NUMBER OF SLEEP HOURS: 12
DO YOU HAVE DIFFICULTY SLEEPING: YES
SLEEP PATTERN: DIFFICULTY FALLING ASLEEP;DISTURBED/INTERRUPTED SLEEP;RESTLESSNESS
DO YOU USE A SLEEP AID: YES

## 2023-09-25 ASSESSMENT — PATIENT HEALTH QUESTIONNAIRE - PHQ9
5. POOR APPETITE OR OVEREATING: 3
SUM OF ALL RESPONSES TO PHQ QUESTIONS 1-9: 24
3. TROUBLE FALLING OR STAYING ASLEEP: 3
1. LITTLE INTEREST OR PLEASURE IN DOING THINGS: 3
8. MOVING OR SPEAKING SO SLOWLY THAT OTHER PEOPLE COULD HAVE NOTICED. OR THE OPPOSITE, BEING SO FIGETY OR RESTLESS THAT YOU HAVE BEEN MOVING AROUND A LOT MORE THAN USUAL: 3
4. FEELING TIRED OR HAVING LITTLE ENERGY: 3
9. THOUGHTS THAT YOU WOULD BE BETTER OFF DEAD, OR OF HURTING YOURSELF: 0
SUM OF ALL RESPONSES TO PHQ QUESTIONS 1-9: 24
6. FEELING BAD ABOUT YOURSELF - OR THAT YOU ARE A FAILURE OR HAVE LET YOURSELF OR YOUR FAMILY DOWN: 3
2. FEELING DOWN, DEPRESSED OR HOPELESS: 3
SUM OF ALL RESPONSES TO PHQ QUESTIONS 1-9: 24
SUM OF ALL RESPONSES TO PHQ9 QUESTIONS 1 & 2: 6
7. TROUBLE CONCENTRATING ON THINGS, SUCH AS READING THE NEWSPAPER OR WATCHING TELEVISION: 3
10. IF YOU CHECKED OFF ANY PROBLEMS, HOW DIFFICULT HAVE THESE PROBLEMS MADE IT FOR YOU TO DO YOUR WORK, TAKE CARE OF THINGS AT HOME, OR GET ALONG WITH OTHER PEOPLE: 3
SUM OF ALL RESPONSES TO PHQ QUESTIONS 1-9: 24

## 2023-09-25 ASSESSMENT — ANXIETY QUESTIONNAIRES
IF YOU CHECKED OFF ANY PROBLEMS ON THIS QUESTIONNAIRE, HOW DIFFICULT HAVE THESE PROBLEMS MADE IT FOR YOU TO DO YOUR WORK, TAKE CARE OF THINGS AT HOME, OR GET ALONG WITH OTHER PEOPLE: EXTREMELY DIFFICULT
5. BEING SO RESTLESS THAT IT IS HARD TO SIT STILL: 3
6. BECOMING EASILY ANNOYED OR IRRITABLE: 3
1. FEELING NERVOUS, ANXIOUS, OR ON EDGE: 3
GAD7 TOTAL SCORE: 20
7. FEELING AFRAID AS IF SOMETHING AWFUL MIGHT HAPPEN: 2
3. WORRYING TOO MUCH ABOUT DIFFERENT THINGS: 3
4. TROUBLE RELAXING: 3
2. NOT BEING ABLE TO STOP OR CONTROL WORRYING: 3
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
## 2023-09-28 ENCOUNTER — HOSPITAL ENCOUNTER (OUTPATIENT)
Dept: PSYCHIATRY | Age: 48
Setting detail: THERAPIES SERIES
Discharge: HOME | End: 2023-09-28
Payer: COMMERCIAL

## 2023-09-28 PROCEDURE — H2020 THER BEHAV SVC, PER DIEM: HCPCS

## 2023-09-28 PROCEDURE — 90792 PSYCH DIAG EVAL W/MED SRVCS: CPT
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
## 2023-09-28 NOTE — GROUP NOTE
Group Therapy Note     Date: 9/28/2023    Group Start Time:  8:45 AM  Group End Time: 10:30 AM  Group Topic: Psychotherapy    [facility] [address] [facility], [facility] [facility]    Number of participants:6  Type of group: Psychotherapy  Mode of intervention: Education, Support, Socialization, Exploration, Clarifying, Problem-solving, Activity, Confrontation, and Limit-setting       Patient's Goal:  To increase socialization and improve interpersonal relationships. Notes: Patient was active in her first group focusing on expression of feelings related to stressors and how they impact mental health symptoms. Themes of group centered on current symptom presentation, interpersonal relationships, and communication. There were other new members in the group so a group exercise was presented on self-disclosure to help everyone get to know each other. Pt able to share how her anxiety and depression interfere with her daily functioning and how coming her is going to assist her. She was able to ask questions and was receptive to the feedback from the group. She completed the exercise on positive affirmations and stated saying \"I am strong enough\" is a saying that will help her to keep going. She did make some positive connections with others through discussing personal issues. Status After Intervention:  Improved    Participation Level:  Active Listener    Participation Quality: Appropriate, Attentive, Sharing, and Supportive      Speech:  normal      Thought Process/Content: Logical      Affective Functioning: Congruent      Mood: anxious      Level of consciousness:  Alert, Oriented x4, and Attentive      Response to Learning: Able to verbalize current knowledge/experience, Able to verbalize/acknowledge new learning, and Able to retain information      Endings: None Reported    Modes of Intervention: Education, Support, Socialization, Exploration,

## 2023-09-28 NOTE — GROUP NOTE
Group Therapy Note    Date: 9/28/2023    Group Start Time: 10:30 AM  Group End Time: 12:00 PM  Group Topic: Psychoeducation    [facility] [address] [facility], [facility]      Number of participants:6  Type of group: Psychoeducation  Mode of intervention: Education, Support, Exploration, Problem-solving, and Activity  Topic: \"Communicating Well\"  Objective: Improve interpersonal communication and develop effective communication strategies      Type of Group: Psychoeducation     Topic:  Develop methods to improve interpersonal communication by organizing speech into \"I see, I feel, and I want. \" This exercise derives from the Depression workbook, Exercise 20. Patient's Goal:   Pt will be able to participate in group discussion, provide reflection, and develop techniques according to psychoeducation topic     Notes:  Pt participated active in group discussion regarding ways to improve communication and develop effective forms to convey thoughts. She was able to identify a recent event that may need communication adjustment via \"I see, I feel, I want. \" She reports her event concerned getting her screen door fixed and adjust the porch to her home, she reports that she texted the contractor after group to begin the process while using her new found techniques. She was then able to acknowledge a more alternate form to express her thoughts. She engaged without difficulty, was able to share own experiences, and was open to learning new information. She was able to recognize an event in which she could thank herself and reports she thanks herself for getting the help she needed and recognizing that she needed help        Status After Intervention:  Improved    Participation Level:  Active Listener and Interactive    Participation Quality: Appropriate, Attentive, Sharing, and Supportive      Speech:  normal      Thought Process/Content:

## 2023-10-09 ENCOUNTER — HOSPITAL ENCOUNTER (OUTPATIENT)
Dept: PSYCHIATRY | Age: 48
Setting detail: THERAPIES SERIES
Discharge: HOME | End: 2023-10-09
Payer: COMMERCIAL

## 2023-10-09 DIAGNOSIS — F41.1 GAD (GENERALIZED ANXIETY DISORDER): Primary | ICD-10-CM

## 2023-10-09 PROCEDURE — H2020 THER BEHAV SVC, PER DIEM: HCPCS

## 2023-10-09 PROCEDURE — 99214 OFFICE O/P EST MOD 30 MIN: CPT | Performed by: PSYCHIATRY & NEUROLOGY

## 2023-10-09 RX ORDER — RAMELTEON 8 MG/1
TABLET ORAL
Qty: 30 TABLET | Refills: 0 | Status: SHIPPED | OUTPATIENT
Start: 2023-10-09

## 2023-10-09 RX ORDER — DULOXETIN HYDROCHLORIDE 60 MG/1
60 CAPSULE, DELAYED RELEASE ORAL 2 TIMES DAILY
Qty: 60 CAPSULE | Refills: 0 | Status: SHIPPED | OUTPATIENT
Start: 2023-10-09

## 2023-10-09 RX ORDER — LORAZEPAM 1 MG/1
1 TABLET ORAL DAILY PRN
Qty: 30 TABLET | Refills: 0 | Status: SHIPPED | OUTPATIENT
Start: 2023-10-09 | End: 2023-11-08

## 2023-10-09 RX ORDER — BUSPIRONE HYDROCHLORIDE 10 MG/1
20 TABLET ORAL 2 TIMES DAILY
Qty: 120 TABLET | Refills: 0 | Status: SHIPPED | OUTPATIENT
Start: 2023-10-09

## 2023-10-09 RX ORDER — ARIPIPRAZOLE 10 MG/1
10 TABLET ORAL NIGHTLY
Qty: 30 TABLET | Refills: 0 | Status: SHIPPED | OUTPATIENT
Start: 2023-10-09

## 2023-10-09 NOTE — PROGRESS NOTES
PSYCHIATRY ATTENDING NOTE    CC: \"I was anxious this morning. \"    S: Patient being seen at [address] [place] [address],[address] [place] in follow-up for MDD, JAMAAL and PTSD. Reviewed initial evaluation completed by Ayla MCNAMARA - no medication changes made. Met with patient to discuss progress with treatment. In good spirits  Reviewed treatment history  Had been seeing NP at [facility] but may switch to [facility] just started individual therapy there as well  Had to miss IOP last week due to work opportunity but plans to come regularly now  Was having physical anxiety this morning but able to work through and come to group   Reviewed medications - feels Ativan more helpful than Atarax    MSE: White female appears age. Pleasant, cooperative, forthcoming. Normal psychomotor activity, gait, strength, tone, eye contact. Mood euthymic. Affect flexible. Speech clear. Thoughts organized. Content future-oriented. No suicidal or homicidal ideations. No paranoia, delusions or hallucinations. Orientation, concentration, recent and remote memory are grossly intact. Fund of knowledge fair. Language use fair. Insight and judgment fair. MEDICATIONS:   Ativan 1 mg daily PRN anxiety (continue)  Buspar 20 mg BID (continue)  Abilify 10 mg nightly (continue)  Cymbalta 60 mg BID (continue)  Ramelteon 8 mg bedtime (continue)   Stop Atarax    ASSESSMENT: JAMAAL with panic attacks  MDD, recurrent, moderate  PTSD  Fibromyalgia    PLAN: Continue IOP and current medications but stop Atarax for streamlining purposes. Continue to monitor symptoms, side effects and response to medications. See back two weeks.  Discuss with team.                           Electronically signed by Vane Arroyo MD on 10/9/2023 at 10:58 AM

## 2023-10-09 NOTE — PROGRESS NOTES
Group Therapy Note      Date: 10/09/2023     Group Start Time:  8:45 AM  Group End Time: 10:30 AM  Number of participants: 7     SEYZ 7S OP RENETTA Garza      Type of Group: Psychotherapy     Patient's Goal:  To increase socialization and improve interpersonal relationships. Notes:  Pt was an active participant in group focusing on expression of feelings related to stressors and how they impact mental health symptoms. Themes of group centered on current symptom presentation, interpersonal relationships, and communication. Group also discussed unhealthy v healthy coping strategies they use and then identified healthy coping strategies they could use instead. She stated \"I slept most of weekend so I didn't have to deal with life\". She shared she sleeps a lot, withdraws from others, and overeats as unhealthy coping skills. She shared that she could use deep breathing, eat healthier and see a counselor for healthier coping skills. She was able to start making some connections with others through discussing personal issues. Status After Intervention:  Unchanged     Participation Level:  Active Listener and Interactive     Participation Quality: Appropriate, Attentive, Sharing, and Supportive      Speech:  normal      Thought Process/Content: Logical      Affective Functioning; Congruent      Mood: Anxious and depressed      Level of consciousness:  Alert, Oriented x4, and Attentive      Response to Learning: Able to verbalize current knowledge/experience, and Able to verbalize/acknowledge new learning      Endings: None Reported     Modes of Intervention: Education, Support, Socialization, Exploration, Clarifying, and Problem-solving     Check in Reviewed: Yes     Intervention Required: No

## 2023-10-09 NOTE — PROGRESS NOTES
Group Therapy Note     Date: 10/09/23  Start Time: 1045  End Time: 12:00  Number of Participants:6     Type of Group: Psychoeducation     Topic:Coping skills for Depression, Behavioral Activation and Social Support     Patient's Goal:  Pt will be able to participate in group discussion and reflect on principles re: how to cope with depression through behavioral Activation and increasing social support. Notes: Pt participated actively in group discussion regarding how to cope with depression and ways to increase activity to help counteract depression taking away our energy to do anything. She shared that likes spending time with her dogs and having a home spa day with a girlfriend. She engaged without difficulty, was able to share own experiences, and was open to learning new information. Status After Intervention: Unchanged     Participation Level: Active Listener and Interactive     Participation Quality: Appropriate, Attentive, Sharing, and Supportive      Speech: normal      Thought Process/Content: Logical and linear and organized     Affective Functioning: Restricted/constricted     Mood: depressed and anxious     Level of consciousness:  Alert, Oriented x4, and Attentive      Response to Learning: Able to verbalize current knowledge/experience, Able to verbalize/acknowledge new learning, Able to retain information, Capable of insight, and Progressing to goal.     Endings: None Reported     Modes of Intervention: Socialization, installation of hope, universality, Education, and Support     Discipline Responsible: /Counselor    X Check in completed and reviewed. Intervention required.  no

## 2023-10-12 ENCOUNTER — HOSPITAL ENCOUNTER (OUTPATIENT)
Dept: PSYCHIATRY | Age: 48
Setting detail: THERAPIES SERIES
Discharge: HOME | End: 2023-10-12
Payer: COMMERCIAL

## 2023-10-12 PROCEDURE — H2020 THER BEHAV SVC, PER DIEM: HCPCS

## 2023-10-12 NOTE — PROGRESS NOTES
Group Therapy Note      Date: 10/12/2023     Group Start Time:  8:45 AM  Group End Time: 10:30 AM  Number of participants: 4     SEYZ 7S OP     RENETTA Carvalho      Type of Group: Psychotherapy     Patient's Goal:  To increase socialization and improve interpersonal relationships. Notes:  Pt was an active participant in group focusing on expression of feelings related to stressors and how they impact mental health symptoms. Themes of group centered on current symptom presentation, interpersonal relationships, and communication. Group also reviewed importance of deep breathing and grounding exercises. Additional education was provided re: deep breathing exercises and grounding strategies and these were practiced in group. She shared re: newfound struggles with anxiety and how she can continue to work on taking care of herself when there is increased need for her to help her father with his increased health and care needs. She reported some improvement in mood following participation in group discussion and deep breathing and grounding exercises. Status After Intervention:  Unchanged     Participation Level:  Active Listener and Interactive     Participation Quality: Appropriate, Attentive, Sharing, and Supportive      Speech:  normal      Thought Process/Content: Logical and linear     Affective Functioning; Congruent     Mood: Anxious and depressed      Level of consciousness:  Alert, Oriented x4, and Attentive      Response to Learning: Able to verbalize current knowledge/experience, Able to verbalize/acknowledge new learning, Able to retain information, Able to change behaviors, capable of insight, and progressing to goal.      Endings: None Reported     Modes of Intervention: Activity, Education, Support, Socialization, Exploration, Clarifying, and Problem-solving     Check in Reviewed: Yes     Intervention Required: No

## 2023-10-12 NOTE — GROUP NOTE
Group Therapy Note    Date: 10/12/2023    Group Start Time: 10:45 AM  Group End Time: 12:00 PM  Group Topic: Psychoeducation    KARMEN 7S OP Marilee Esquivel LISW-S Bartholome Mowers, RN        Group Therapy Note    Attendees: 4     Group Topic: Alcohol-Medication Interactions   Patient's Goal:  Pt will be an active listener in class re: potential for harmful medication-alcohol interactions. Pt will be able to identify potential for adverse events when alcohol is combined with certain medications, particularly those with sedative effects. The patient expressed concern regarding past alcohol use and psychotropic medication use. She reports that in the past she had experienced hallucinations while mixing alcohol and psychotropic medications. She reports that her goal is to not drink while taking her behavioral health medications ever again. Notes: Christie Newman was an active participant in group. She frequently asked questions and was an active listener. She provided support for her peers and shared personal experiences with the group. The group was focused on medication interactions with alcohol and following completion she noted that she had learned a lot and the information presented was helpful     Patient's Goal:  To not drink while taking her behavioral health medication with alcohol    Notes:  Christie Newmna was an active participant in group. She frequently asked questions and was an active listener. She provided support for her peers and shared personal experiences with the group. The group was focused on medication interactions with alcohol and following completion she noted that she had learned a lot and the information presented was helpful. She noted that she learned a considerable amount of knowledge from today's psychoeducation group    Status After Intervention:  Improved    Participation Level:  Active Listener and Interactive    Participation

## 2023-10-16 ENCOUNTER — HOSPITAL ENCOUNTER (OUTPATIENT)
Dept: PSYCHIATRY | Age: 48
Setting detail: THERAPIES SERIES
Discharge: HOME | End: 2023-10-16
Payer: COMMERCIAL

## 2023-10-17 ENCOUNTER — HOSPITAL ENCOUNTER (OUTPATIENT)
Dept: PSYCHIATRY | Age: 48
Setting detail: THERAPIES SERIES
Discharge: HOME | End: 2023-10-17
Payer: COMMERCIAL

## 2023-10-17 PROCEDURE — H2020 THER BEHAV SVC, PER DIEM: HCPCS

## 2023-10-17 NOTE — PLAN OF CARE
Group Therapy Note     Date: 10/17/23  Start Time: 1045  End Time: 12:00  Number of Participants:10     Type of Group: Psychoeducation     Topic: Intrinsic Motivation: How Internal Rewards Drive Behavior     Patient's Goal:  Pt will be able to participate in group discussion and reflect on principles re: intrinsic vs extrinsic motivation and importance of engaging in behaviors for the satisfaction and enjoyment of the activity. Notes: Pt participated actively in group discussion regarding ways to increase intrinsic motivation. She was able to identify things she enjoys including spending time with family and friends. She engaged without difficulty, was able to share own experiences, and was open to learning new information. Status After Intervention: Unchanged     Participation Level: Active Listener and Interactive     Participation Quality: Appropriate, Attentive, Sharing, and Supportive      Speech: normal      Thought Process/Content: Logical and linear and organized     Affective Functioning: Restricted/constricted     Mood: depressed and anxious     Level of consciousness:  Alert, Oriented x4, and Attentive      Response to Learning: Able to verbalize current knowledge/experience, Able to verbalize/acknowledge new learning, Able to retain information, Capable of insight, and Progressing to goal.     Endings: None Reported     Modes of Intervention: Socialization, installation of hope, universality, Education, and Support     Discipline Responsible: /Counselor    X Check in completed and reviewed. Intervention required.  no

## 2023-10-17 NOTE — PLAN OF CARE
Group Therapy Note      Date: 10/17/2023     Group Start Time:  8:45 AM  Group End Time: 10:30 AM  Number of participants: 4     SEYZ 7S OP RENETTA Garza      Type of Group: Psychotherapy     Patient's Goal:  To increase socialization and improve interpersonal relationships. Notes:  Pt was an active participant in group focusing on expression of feelings related to stressors and how they impact mental health symptoms. Themes of group centered on current symptom presentation, interpersonal relationships, and communication. Group also explored 4 types of communication (aggressive, passive, passive aggressive, and assertive). Pt shared she has been more motivated to doing things. She went to corn maze with a friend, asked son re: eating together or spending time together, and was looking forward to coming to group today. She seems to have made connections with group members through sharing personal info. Status After Intervention:  Unchanged     Participation Level:  Active Listener and Interactive     Participation Quality: Appropriate, Attentive, Sharing, and Supportive      Speech:  normal      Thought Process/Content: Logical and linear     Affective Functioning; Blunted     Mood: Anxious and depressed      Level of consciousness:  Alert, Oriented x4, and Attentive      Response to Learning: Able to verbalize current knowledge/experience, Able to verbalize/acknowledge new learning, Able to retain information, Able to change behaviors, capable of insight, and progressing to goal.      Endings: None Reported     Modes of Intervention: Activity, Education, Support, Socialization, Exploration, Clarifying, and Problem-solving     Check in Reviewed: Yes     Intervention Required: No

## 2023-10-20 ENCOUNTER — HOSPITAL ENCOUNTER (EMERGENCY)
Age: 48
Discharge: HOME | End: 2023-10-20
Payer: COMMERCIAL

## 2023-10-20 VITALS
OXYGEN SATURATION: 98 % | BODY MASS INDEX: 31.56 KG/M2 | WEIGHT: 181 LBS | DIASTOLIC BLOOD PRESSURE: 79 MMHG | SYSTOLIC BLOOD PRESSURE: 132 MMHG | TEMPERATURE: 97.8 F | RESPIRATION RATE: 18 BRPM | HEART RATE: 73 BPM

## 2023-10-20 DIAGNOSIS — S39.012A STRAIN OF LUMBAR REGION, INITIAL ENCOUNTER: Primary | ICD-10-CM

## 2023-10-20 PROCEDURE — 99211 OFF/OP EST MAY X REQ PHY/QHP: CPT

## 2023-10-20 RX ORDER — METHYLPREDNISOLONE 4 MG/1
TABLET ORAL
Qty: 1 KIT | Refills: 0 | Status: SHIPPED | OUTPATIENT
Start: 2023-10-20

## 2023-10-20 ASSESSMENT — PAIN DESCRIPTION - FREQUENCY: FREQUENCY: CONTINUOUS

## 2023-10-20 ASSESSMENT — PAIN DESCRIPTION - DESCRIPTORS: DESCRIPTORS: ACHING;THROBBING;SORE

## 2023-10-20 ASSESSMENT — PAIN SCALES - GENERAL: PAINLEVEL_OUTOF10: 6

## 2023-10-20 ASSESSMENT — PAIN DESCRIPTION - ORIENTATION: ORIENTATION: LEFT;LOWER

## 2023-10-20 ASSESSMENT — PAIN DESCRIPTION - PAIN TYPE: TYPE: ACUTE PAIN

## 2023-10-20 ASSESSMENT — PAIN DESCRIPTION - LOCATION: LOCATION: BACK

## 2023-10-20 ASSESSMENT — PAIN - FUNCTIONAL ASSESSMENT: PAIN_FUNCTIONAL_ASSESSMENT: 0-10

## 2023-10-20 ASSESSMENT — PAIN DESCRIPTION - ONSET: ONSET: GRADUAL

## 2023-10-20 NOTE — ED PROVIDER NOTES
[facility] URGENT CARE  EMERGENCY DEPARTMENT ENCOUNTER        NAME: Melany Zepeda  :  1975  MRN:  [id number]  Date of evaluation: 10/20/2023  Provider: Ashish Bolivar PA-C  PCP: Rahul Ta MD  Note Started : 9:21 AM EDT 10/20/23    Chief Complaint: Back Pain (Having pain in left lower back since yesterday. State it feels like a \"pulled muscle\". )      This is a 47-year-old female that presents to urgent care complaint of left lower back pain x1 day. States that she was lifting laundry yesterday and felt pain in her back. Pain is staying mostly in the left lower back. She denies any bowel or bladder dysfunction. No chest pain or shortness of breath. No numbness or tingling. She does have history of fibromyalgia. She has been taking tramadol for the pain as well as Zanaflex. She does have a roll-on medication as well. First contact patient she appears to be in no acute distress. Review of Systems  Pertinent positives and negatives are stated within HPI, all other systems reviewed and are negative. Allergies: Codeine, Hydrocodone, Oxycodone, and Oxycontin [oxycodone hcl]     --------------------------------------------- PAST HISTORY ---------------------------------------------  Past Medical History:  has a past medical history of Anemia, Anxiety, Arthritis, Asthma, Chronic back pain, DDD (degenerative disc disease), Depression, Fibromyalgia, Fibromyalgia, IBS (irritable bowel syndrome), Migraines, Pain, and PTSD (post-traumatic stress disorder). Past Surgical History:  has a past surgical history that includes Tonsillectomy; knee surgery; shoulder surgery; Abdominal exploration surgery; other surgical history; other surgical history (1/10/13); Spine surgery (1/10/13); Nerve Block (Bilateral, 10 29 13); Nerve Block (13); Abdomen surgery; Colonoscopy; Hysterectomy; and Appendectomy. Social History:  reports that she has never smoked.  She has been exposed to

## 2023-10-23 ENCOUNTER — HOSPITAL ENCOUNTER (OUTPATIENT)
Dept: PSYCHIATRY | Age: 48
Setting detail: THERAPIES SERIES
Discharge: HOME | End: 2023-10-23
Payer: COMMERCIAL

## 2023-10-23 PROCEDURE — H2020 THER BEHAV SVC, PER DIEM: HCPCS

## 2023-10-23 NOTE — PLAN OF CARE
Group Therapy Note      Date: 10/23/2023     Group Start Time:  8:45 AM  Group End Time: 10:30 AM  Number of participants: 5     SEYZ 7S OP RENETTA Garza      Type of Group: Psychotherapy     Patient's Goal:  To increase socialization and improve interpersonal relationships. Notes:  Pt was an active participant in group focusing on expression of feelings related to stressors and how they impact mental health symptoms. Themes of group centered on current symptom presentation, interpersonal relationships, and communication. Group also explored successful conflict resolution and stressed ability to stay calm, control your emotions and behavior, listen so that you can see the situation from the other person's view, and remain respectful. She was able to share she is struggling with decreased patience in helping her father who has alzheimers. She pulled muscle in back so wasn't able to do much last week. She would like to increase time spent with son. She made positive connections with others through sharing personal info and providing support and encouragement to others. Status After Intervention:  Unchanged     Participation Level:  Active Listener and Interactive     Participation Quality: Appropriate, Attentive, Sharing, and Supportive      Speech:  normal      Thought Process/Content: Logical and linear     Affective Functioning: Restricted/constricted     Mood: Anxious and depressed      Level of consciousness:  Alert, Oriented x4, and Attentive      Response to Learning: Able to verbalize current knowledge/experience, Able to verbalize/acknowledge new learning, Able to retain information, Able to change behaviors, capable of insight, and progressing to goal.      Endings: None Reported     Modes of Intervention: Activity, Education, Support, Socialization, Exploration, Clarifying, and Problem-solving     Check in Reviewed: Yes     Intervention Required: No

## 2023-10-23 NOTE — PLAN OF CARE
Group Therapy Note     Date: 10/23/23  Start Time: 1045  End Time: 12:00  Number of Participants:8     Type of Group: Psychoeducation     Topic:Assertive Communication, I feel Statements,Solution [place]    Patient's Goal:  Pt will be able to participate in group discussion and reflect on principles re: how to increase effective communication using assertiveness, I feel statements, and communication sandwich. Notes: Pt participated actively in group discussion regarding the importance of using assertive communication skills. .She shared that she would like to spend more time with son and addressed how to use principles of I feel statements and communication sandwich to address wanting to increase time spent with son. She engaged without difficulty, was able to share own experiences, and was open to learning new information. Status After Intervention: Unchanged     Participation Level: Active Listener and Interactive     Participation Quality: Appropriate, Attentive, Sharing, and Supportive      Speech: normal      Thought Process/Content: Logical and linear and organized     Affective Functioning: Restricted/constricted     Mood: depressed and anxious     Level of consciousness:  Alert, Oriented x4, and Attentive      Response to Learning: Able to verbalize current knowledge/experience, Able to verbalize/acknowledge new learning, Able to retain information, Capable of insight, and Progressing to goal.     Endings: None Reported     Modes of Intervention: Socialization, installation of hope, universality, Education, and Support     Discipline Responsible: /Counselor    X Check in completed and reviewed. Intervention required.  no

## 2023-10-24 ENCOUNTER — HOSPITAL ENCOUNTER (OUTPATIENT)
Dept: PSYCHIATRY | Age: 48
Setting detail: THERAPIES SERIES
Discharge: HOME | End: 2023-10-24
Payer: COMMERCIAL

## 2023-10-24 NOTE — PROGRESS NOTES
Pt didn't attend group today. SW called and she stated that she got up to attend group but wasn't feeling well so went back to bed. She hopes to feel well enough to attend on 10/26.

## 2023-10-26 ENCOUNTER — HOSPITAL ENCOUNTER (OUTPATIENT)
Dept: PSYCHIATRY | Age: 48
Setting detail: THERAPIES SERIES
Discharge: HOME | End: 2023-10-26
Payer: COMMERCIAL

## 2023-10-26 NOTE — PROGRESS NOTES
Pt didn't attend iop on 10/26. SW called. She stated that she helps a friend out by driving her to appts. She had 3 appts to take her to today. LARRY explained that program expectation is she attend at least twice wkly and she states she will be here 10/30 and 10/31.

## 2023-10-30 ENCOUNTER — HOSPITAL ENCOUNTER (OUTPATIENT)
Dept: PSYCHIATRY | Age: 48
Setting detail: THERAPIES SERIES
End: 2023-10-30
Payer: COMMERCIAL

## 2023-10-30 PROCEDURE — H2020 THER BEHAV SVC, PER DIEM: HCPCS

## 2023-10-30 NOTE — PLAN OF CARE
Date: 10/30/23  Start Time: 1045  End Time: 12:00  Number of Participants:7     Type of Group: Psychoeducation     Topic:The cognitive Behavioral Model; Reviewed thinking errors and encouraged usage of  thought records. Group discussed various ways to challenge negative thinking including: socratic questioning, decatastrophizing thought distortions ,putting thoughts on trial,breathing exercise, introductory to tapping, and leaves on a stream exercise. Patient's Goal:  Pt will be able to participate in group discussion and reflect on principles re: various ways to challenge negative thinking. Pt will participate in deep breathing, tapping, and leaves on a stream activities. Notes: Pt participated actively in group discussion and deep breathing, tapping, and leaves on a stream activities. She engaged with little difficulty, was able to share own experiences, and was open to learning new information. She expressed some improvement in mood following participation in group. Status After Intervention: Unchanged     Participation Level: Active Listener and Interactive     Participation Quality: Appropriate, Attentive, Sharing, and Supportive. Speech: normal      Thought Process/Content: Logical and linear and organized     Affective Functioning: Restricted/constricted     Mood: depressed and anxious     Level of consciousness:  Alert, Oriented x4, and Attentive      Response to Learning: Able to verbalize current knowledge/experience, Able to verbalize/acknowledge new learning, Able to retain information, Able to change behavior, and Progressing to goal.     Endings: None Reported     Modes of Intervention: Activity, Socialization, installation of hope, universality, Education, and Support     Discipline Responsible: /Counselor    X Check in completed and reviewed. Intervention required.  no

## 2023-10-30 NOTE — PLAN OF CARE
Group Therapy Note      Date: 10/30/2023     Group Start Time:  8:45 AM  Group End Time: 10:30 AM  Number of participants: 6     SEYZ 7S OP RENETTA Garza      Type of Group: Psychotherapy     Patient's Goal:  To increase socialization and improve interpersonal relationships. Notes:  Pt was an active participant in group focusing on expression of feelings related to stressors and how they impact mental health symptoms. Themes of group centered on current symptom presentation, interpersonal relationships, and communication. Group further reviewed CBT principals and examples. She shared continued struggles with anxiety and depression leading to crying and unable to do any thing for halloween as she couldn't make herself go to activities as planned. She shared examples of negative thoughts and was able to generate alternative positive thoughts by following cbt principals. She made positive connections with others through sharing personal info and providing support and encouragement to others. Status After Intervention:  Unchanged     Participation Level:  Active Listener and Interactive     Participation Quality: Appropriate, Attentive, Sharing, and Supportive      Speech:  normal      Thought Process/Content: Logical, linear, and organized     Affective Functioning: Restricted/constricted     Mood: Anxious and depressed      Level of consciousness:  Alert, Oriented x4, and Attentive      Response to Learning: Able to verbalize current knowledge/experience, Able to verbalize/acknowledge new learning, Able to retain information, Able to change behaviors, capable of insight, and progressing to goal.      Endings: None Reported     Modes of Intervention: Education, Support, Socialization, Exploration, Clarifying, and Problem-solving     Check in Reviewed: Yes     Intervention Required: No

## 2023-10-31 ENCOUNTER — HOSPITAL ENCOUNTER (OUTPATIENT)
Dept: PSYCHIATRY | Age: 48
Setting detail: THERAPIES SERIES
Discharge: HOME | End: 2023-10-31
Payer: COMMERCIAL

## 2023-10-31 ENCOUNTER — APPOINTMENT (OUTPATIENT)
Dept: PSYCHIATRY | Age: 48
End: 2023-10-31
Payer: COMMERCIAL

## 2023-10-31 DIAGNOSIS — F41.9 ANXIETY: Primary | ICD-10-CM

## 2023-10-31 PROCEDURE — 99214 OFFICE O/P EST MOD 30 MIN: CPT | Performed by: PSYCHIATRY & NEUROLOGY

## 2023-10-31 PROCEDURE — H2020 THER BEHAV SVC, PER DIEM: HCPCS

## 2023-10-31 RX ORDER — CLONAZEPAM 1 MG/1
1 TABLET ORAL DAILY PRN
Qty: 30 TABLET | Refills: 0 | Status: SHIPPED | OUTPATIENT
Start: 2023-10-31 | End: 2023-11-30

## 2023-10-31 RX ORDER — BUSPIRONE HYDROCHLORIDE 10 MG/1
20 TABLET ORAL 2 TIMES DAILY
Qty: 120 TABLET | Refills: 0 | Status: SHIPPED | OUTPATIENT
Start: 2023-10-31

## 2023-10-31 RX ORDER — DULOXETIN HYDROCHLORIDE 60 MG/1
60 CAPSULE, DELAYED RELEASE ORAL 2 TIMES DAILY
Qty: 60 CAPSULE | Refills: 0 | Status: SHIPPED | OUTPATIENT
Start: 2023-10-31

## 2023-10-31 RX ORDER — ARIPIPRAZOLE 10 MG/1
10 TABLET ORAL NIGHTLY
Qty: 30 TABLET | Refills: 0 | Status: SHIPPED | OUTPATIENT
Start: 2023-10-31

## 2023-10-31 RX ORDER — RAMELTEON 8 MG/1
TABLET ORAL
Qty: 30 TABLET | Refills: 0 | Status: SHIPPED | OUTPATIENT
Start: 2023-10-31

## 2023-10-31 NOTE — PROGRESS NOTES
PSYCHIATRY ATTENDING NOTE    CC: \"Everything is going fine. \"    S: Patient being seen at [address] [place] [address],[address] [facility] in follow-up for MDD, JAMAAL and PTSD. Met with patient to discuss progress with treatment. In good spirits  Groups going well  Reviewed medications - feels Ativan wears off quickly  Discussed increasing Ativan to bid prn vs changing to Klonopin  Patient would prefer to try Klonopin once daily prn  Otherwise no acute issues or concerns  Transitioning to [facility] for outpatient treatment    MSE: Jluis Stevenson female appears age. Pleasant, cooperative, forthcoming. Normal psychomotor activity, gait, strength, tone, eye contact. Mood euthymic. Affect flexible. Speech clear. Thoughts organized. Content future-oriented. No suicidal or homicidal ideations. No paranoia, delusions or hallucinations. Orientation, concentration, recent and remote memory are grossly intact. Fund of knowledge fair. Language use fair. Insight and judgment fair. MEDICATIONS:   Cymbalta 60 mg bid (cont)     Abilify 10 mg nightly (cont)     Buspar 20 mg bid (cont)     Ramelteon 8 mg at bed (cont)     Klonopin 1 mg daily prn (cont)    ASSESSMENT: MDD recurrent moderate     JAMAAL  PTSD  Fibromyalgia    PLAN: Continue IOP and current medications. Change prn anxiolytic to longer-acting Klonopin. See back two weeks.  Discuss with team.                           Electronically signed by Gisselle Varela MD on 10/31/2023 at 11:42 AM

## 2023-10-31 NOTE — PLAN OF CARE
Group Therapy Note     Date: 10/31/23  Start Time: 3764  End Time: 12:00  Number of Participants:11     Type of Group: Psychoeducation     Topic:Socialization and Recreational Therapy     Patient's Goal:  Pt will participate in recreational activities, socialize with other group members, and be able to verbalize improved mood following engagement in positive activity. Notes: Pt participated actively in recreational group activity and vocalized improved mood following participation. . She engaged without difficulty, was able to share own experiences, and was open to learning new information. Status After Intervention: Improved     Participation Level: Active Listener and Interactive     Participation Quality: Appropriate, Attentive, Sharing, and Supportive      Speech: normal      Thought Process/Content: Logical and linear and organized     Affective Functioning: Congruent     Mood: euthymic     Level of consciousness:  Alert, Oriented x4, and Attentive      Response to Learning: Able to verbalize current knowledge/experience, Able to verbalize/acknowledge new learning, Able to retain information, Capable of insight, and Progressing to goal.     Endings: None Reported     Modes of Intervention: Activity, Socialization, installation of hope, universality, Education, and Support     Discipline Responsible: /Counselor    X Check in completed and reviewed. Intervention required.  no
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
## 2023-11-06 ENCOUNTER — HOSPITAL ENCOUNTER (OUTPATIENT)
Dept: PSYCHIATRY | Age: 48
Setting detail: THERAPIES SERIES
Discharge: HOME | End: 2023-11-06
Payer: COMMERCIAL

## 2023-11-06 PROCEDURE — H2020 THER BEHAV SVC, PER DIEM: HCPCS

## 2023-11-06 NOTE — PLAN OF CARE
Group Therapy Note     Date: 11/06/23  Start Time: 1045  End Time: 12:00  Number of Participants: 9     Type of Group: Psychoeducation     Topic: Gratitude      Patient's Goal:  Pt will participate in discussion and gratitude activities and reflect on principals discussed. Notes: Pt participated actively in discussion and gratitude activities and vocalized improved mood following participation. SW also shared re: Leanna support groups and encouraged group members try LEANNA activities as a way of increasing social support and positive coping skills. She engaged without difficulty, was able to share own experiences, and was open to learning new information. Status After Intervention: Improved     Participation Level: Active Listener and Interactive     Participation Quality: Appropriate, Attentive, Sharing, and Supportive      Speech: normal      Thought Process/Content: Logical and linear and organized     Affective Functioning: Restricted/constricted     Mood: depressed and anxious     Level of consciousness:  Alert, Oriented x4, and Attentive      Response to Learning: Able to verbalize current knowledge/experience, Able to verbalize/acknowledge new learning, Able to retain information, Capable of insight, and Progressing to goal.     Endings: None Reported     Modes of Intervention: Activity, Socialization, installation of hope, universality, Education, and Support     Discipline Responsible: /Counselor    X Check in completed and reviewed. Intervention required.  no

## 2023-11-06 NOTE — PLAN OF CARE
Group Therapy Note      Date: 11/06/2023     Group Start Time:  8:45 AM  Group End Time: 10:30 AM  Number of participants: 9     SEYZ 7S OP RENETTA Garza      Type of Group: Psychotherapy     Patient's Goal:  To increase socialization and improve interpersonal relationships. Notes:  Pt was an active participant in group focusing on expression of feelings related to stressors and how they impact mental health symptoms. Themes of group centered on current symptom presentation, interpersonal relationships, and communication. Group also discussed the importance of having a balance between helping others and taking care of ourselves. We also explored healthy vs unhealthy coping skills and reviewed cognitive defusion skills. She shared she has been frustrated with her sons not helping around house and doesn't feel meds or coping skills are working. She made positive connections with others through sharing personal info and providing support and encouragement to others. Status After Intervention:  Unchanged     Participation Level:  Active Listener and Interactive     Participation Quality: Appropriate, Attentive, Sharing, and Supportive      Speech:  normal      Thought Process/Content: Logical, linear, and organized     Affective Functioning: restricted/constricted     Mood: Depressed and anxious     Level of consciousness:  Alert, Oriented x4, and Attentive      Response to Learning: Able to verbalize current knowledge/experience, Able to verbalize/acknowledge new learning,able to retain info, able to change behavior, capable of insight, and progressing to goal.      Endings: None Reported     Modes of Intervention: Education, Support, Socialization, Exploration, Clarifying, and Problem-solving     Check in Reviewed: Yes     Intervention Required: No

## 2023-11-07 ENCOUNTER — HOSPITAL ENCOUNTER (OUTPATIENT)
Dept: PSYCHIATRY | Age: 48
Setting detail: THERAPIES SERIES
Discharge: HOME | End: 2023-11-07
Payer: COMMERCIAL

## 2023-11-07 ENCOUNTER — CLINICAL DOCUMENTATION (OUTPATIENT)
Dept: SPIRITUAL SERVICES | Age: 48
End: 2023-11-07

## 2023-11-07 LAB
ALBUMIN SERPL-MCNC: NORMAL G/DL
ALP BLD-CCNC: NORMAL U/L
ALT SERPL-CCNC: NORMAL U/L
ANION GAP SERPL CALCULATED.3IONS-SCNC: NORMAL MMOL/L
AST SERPL-CCNC: NORMAL U/L
BILIRUB SERPL-MCNC: NORMAL MG/DL
BUN BLDV-MCNC: NORMAL MG/DL
C-REACTIVE PROTEIN: NORMAL
CALCIUM SERPL-MCNC: NORMAL MG/DL
CHLORIDE BLD-SCNC: NORMAL MMOL/L
CO2: NORMAL
CREAT SERPL-MCNC: NORMAL MG/DL
EGFR: NORMAL
GLUCOSE BLD-MCNC: NORMAL MG/DL
POTASSIUM SERPL-SCNC: NORMAL MMOL/L
SODIUM BLD-SCNC: NORMAL MMOL/L
TOTAL PROTEIN: NORMAL

## 2023-11-07 PROCEDURE — H2012 BEHAV HLTH DAY TREAT, PER HR: HCPCS

## 2023-11-07 NOTE — PROGRESS NOTES
Spiritual Support Group Note    Number of Participants in Group:     9                  Time:  3 pm    Goal: Relief from isolation and loneliness             Ela Sharing             Self-understanding and gain insight              Acceptance and belonging            Recognize they are not alone                Socialization             Empowerment       Encouragement    Topic:  [x] Spiritual Wellness and Self Care                  [] Hope                     [] Connecting with Divine/Others        [] Thankfulness and Gratitude               []  Meaningfulness and Purpose               [] Forgiveness               [x] Peace               [] Connect to [employer]      [] Other    Participation Level:   [x] Active Listener   [] Minimal   [] Monopolizing   [] Interactive   [] No Participation   []  Other:     Attention:   [x] Alert   [] Distractible   [] Drowsy   [] Poor   [] Other:    Manner:   [x] Cooperative   [] Suspicious   [] Withdrawn   [] Guarded   [] Irritable   [] Inhospitable   [] Other:     Others Comments from Group:

## 2023-11-07 NOTE — PLAN OF CARE
Group Therapy Note     Date: 11/07/23  Start Time: 1045  End Time: 12:00  Number of Participants: 9     Type of Group: Psychoeducation     Topic: Spirituality-Feeling Wheel and How to identify emotional triggers      Patient's Goal:  Pt will participate in discussion re: feeling wheel and importance of identifying emotional triggers and reflect on principals discussed. Notes: Pt participated actively in discussion re: emotions and importance of recognizing them and identifying emotional triggers and reflect on how spirituality can provide support and comfort. She engaged without difficulty, was able to share own experiences, and was open to learning new information. Status After Intervention: Unchanged     Participation Level: Active Listener and Interactive     Participation Quality: Appropriate, Attentive, Sharing, and Supportive      Speech: normal      Thought Process/Content: Logical and linear and organized     Affective Functioning: Restricted/constricted     Mood: depressed and anxious     Level of consciousness:  Alert, Oriented x4, and Attentive      Response to Learning: Able to verbalize current knowledge/experience, Able to verbalize/acknowledge new learning, Able to retain information, Able to change behavior, and Progressing to goal.     Endings: None Reported     Modes of Intervention: Activity, Socialization, installation of hope, universality, Education, and Support     Discipline Responsible: /Counselor    X Check in completed and reviewed. Intervention required.  no

## 2023-11-07 NOTE — PLAN OF CARE
A treatment planning meeting was previously held on 10/31 at which time pts case was discussed. Present at that time were: Dr Cornelio Lutz MD, Keven Quintana, and Dana Rai.RENETTA Tay. Pt has been working on goals:  -Will report anxiety at manageable levels  -Will verbalize concerns and demonstrate effective coping strategies    Pt has expressed frustration re: continued anxiety and depression. She states that she tries coping skills but they haven't been helping with her anxiety and depression. She shared struggles with getting adult children to help her and would like to address issues with them but they both work different shifts and often aren't home. She is to continue attending iop 3 days wkly and to continue seeing Dr Cornelio Lutz for med management.

## 2023-11-16 ENCOUNTER — HOSPITAL ENCOUNTER (OUTPATIENT)
Dept: PSYCHIATRY | Age: 48
Setting detail: THERAPIES SERIES
Discharge: HOME | End: 2023-11-16
Payer: COMMERCIAL

## 2023-11-16 PROCEDURE — H2020 THER BEHAV SVC, PER DIEM: HCPCS

## 2023-11-16 NOTE — PLAN OF CARE
Group Therapy Note      Date: 11/16/2023     Group Start Time:  8:45 AM  Group End Time: 10:30 AM  Number of participants: 6     SEYZ 7S OP     RENETTA Carvalho      Type of Group: Psychotherapy     Patient's Goal:  To increase socialization and improve interpersonal relationships. Notes:  Pt was an active participant in group focusing on expression of feelings related to stressors and how they impact mental health symptoms. Themes of group centered on current symptom presentation, interpersonal relationships, and communication. Group also reviewed coping skills, wise mind principles, and gratitudes. She shared she hasn't come to group because she has been so depressed and ruminating re: anxiety re: father's heart surgery tomorrow. She made positive connections with others through sharing personal info and providing support and encouragement to others. She reported some improvement in mood following participation in group. Status After Intervention:  Unchanged     Participation Level:  Active Listener and Interactive     Participation Quality: Appropriate, Attentive, Sharing, and Supportive      Speech:  normal      Thought Process/Content: Logical, linear and organied     Affective Functioning:Congruent     Mood: Depressed and anxious     Level of consciousness:  Alert, Oriented x4, and Attentive      Response to Learning: Able to verbalize current knowledge/experience, Able to verbalize/acknowledge new learning, able to retain info, able to change behavior, and progressing to goal.      Endings: None Reported     Modes of Intervention: Education, Support, Socialization, Exploration, Clarifying, and Problem-solving     Check in Reviewed: Yes     Intervention Required: No

## 2023-11-16 NOTE — PLAN OF CARE
Date: 11/16/2023     Group Start Time: 10:45 AM  Group End Time: 12:00 PM  Group Topic: Psychoeducation     SEY[facility] 7S [facility]    [address], [place]     Number of participants: 8  Type of group: Psychoeducation  Mode of intervention: Education, Support, Socialization, Exploration, Clarifying, and Problem-solving     Topic: Beating the holiday blues, Holiday season Self care tips, Grieving during the holidays. Objective: pt will be able to participate in discussion re: importance of self care activities and how to cope with grief during the holidays. Group Therapy Note:  Pt actively participated in the group session. She shared re: being worried re her fathers health and how that is affecting her functioning. She was interactive in the group, engaged without difficulty, was able to share own experiences, and was open to learning new information. She was able to identify committed action goal to work on today and verbalized decision to get a hotel room by [facility] as part of self care so she doesn't have drive back and forth to hospital this weekend and can be nearby if fathers health requires it. . She verbalized some improvement in mood following participation in [address] groups today. Status after intervention:Unchanged  Participation level: Active Listener and Interactive  Participation Quality: Appropriate, Attentive, and Sharing  Speech: normal  Thought Process/Content:Logical and linear  Affect: Congruent  Mood: Anxious and depressed  Self report: None Reported  Response to learning: Able to verbalize current knowledge/experience and Able to verbalize/acknowledge new learning, able to retain info, capable of insight, able to change behaviors, and progressing to goal.  Discipline Responsible: /Counselor     [x] Check in completed and reviewed. Intervention required.  No

## 2023-11-20 ENCOUNTER — HOSPITAL ENCOUNTER (OUTPATIENT)
Dept: PSYCHIATRY | Age: 48
Setting detail: THERAPIES SERIES
Discharge: HOME | End: 2023-11-20
Payer: COMMERCIAL

## 2023-11-20 DIAGNOSIS — F41.9 ANXIETY: ICD-10-CM

## 2023-11-20 PROCEDURE — 99214 OFFICE O/P EST MOD 30 MIN: CPT | Performed by: PSYCHIATRY & NEUROLOGY

## 2023-11-20 PROCEDURE — H2012 BEHAV HLTH DAY TREAT, PER HR: HCPCS

## 2023-11-20 RX ORDER — CLONAZEPAM 1 MG/1
1 TABLET ORAL 2 TIMES DAILY PRN
Qty: 60 TABLET | Refills: 0 | Status: SHIPPED | OUTPATIENT
Start: 2023-11-27 | End: 2023-12-27

## 2023-11-20 NOTE — PROGRESS NOTES
PSYCHIATRY ATTENDING NOTE    CC: \"I was more anxious this weekend. \"    S: Patient being seen at [facility] [facility] in follow-up for MDD, JAMAAL and PTSD. Met with patient to discuss progress with treatment. In fair spirits today  Father had heart valve surgery on Friday   Patient acknowledges increase in situational anxiety  She reports sometimes the Klonopin helps but not always; same with coping tools  Discussed increasing the Klonopin to bid prn  Groups have been supportive and therapeutic  No acute concerns otherwise    MSE: White female appears age. Pleasant, cooperative, forthcoming. Normal psychomotor activity, gait, strength, tone, eye contact. Mood euthymic. Affect flexible. Speech clear. Thoughts organized. Content future-oriented. No suicidal or homicidal ideations. No paranoia, delusions or hallucinations. Orientation, concentration, recent and remote memory are grossly intact. Fund of knowledge fair. Language use fair. Insight and judgment fair. MEDICATIONS:   Cymbalta 60 mg bid (cont)     Abilify 10 mg nightly (cont)     Buspar 20 mg bid (cont)     Ramelteon 8 mg at bed (cont)     Klonopin 1 mg bid prn (increasing)    ASSESSMENT: MDD recurrent moderate     JAMAAL  PTSD  Fibromyalgia    PLAN: Continue IOP and current medications. Optimize Klonopin. See back two weeks.  Discuss with team.                           Electronically signed by Liset Mcclain MD on 11/20/2023 at 11:13 AM

## 2023-11-20 NOTE — PLAN OF CARE
Group Therapy Note     Date: 11/20/2023     Group Start Time: 10:45 AM  Group End Time: 12:00 PM  Group Topic: Psychoeducation  Number of participants: 8     SEYZ 7S [facility]     Topic: Becoming psychological flexible and responding to [employer] Addiction and Depression education film. Mode of intervention: Catharsis, interpersonal learning, imparting of information, socialization, universality, and group cohesion. RENETTA Tomlin    Pts goal: Pt will participate in group discussion re: importance of becoming psychologically flexible and reflecting on concepts of: adapting to life's difficulties by learning key components of acceptance, detaching from thoughts, present focused perspective, and values clarification and enactment. Note: Pt participated actively in deep breathing activity, and discussion of above concepts. Pt watched video and able to verbalize some feedback and what she learned from film. She engaged without difficulty, was able to share own experiences, and was open to learning new information. She reports some improvement in mood following deep breathing activity and watching of video. .     Status after intervention:Improved  Participation level: Active Listener, and Interactive  Participation Quality: Appropriate, Attentive, Sharing  Speech: normal  Thought Process/Content:Logical  Affect: Restricted/constricted  Mood:Depressed and anxious  Self report: None Reported  Response to learning: Able to verbalize current knowledge/experience, Able to verbalize/acknowledge new learning, Able to retain information, and Capable of insight  Discipline Responsible: /Counselor     [x] Check in completed and reviewed. Intervention required.  no

## 2023-11-30 ENCOUNTER — HOSPITAL ENCOUNTER (EMERGENCY)
Age: 48
Discharge: HOME | End: 2023-11-30
Attending: EMERGENCY MEDICINE
Payer: COMMERCIAL

## 2023-11-30 ENCOUNTER — APPOINTMENT (OUTPATIENT)
Dept: GENERAL RADIOLOGY | Age: 48
End: 2023-11-30
Payer: COMMERCIAL

## 2023-11-30 ENCOUNTER — APPOINTMENT (OUTPATIENT)
Dept: CT IMAGING | Age: 48
End: 2023-11-30
Payer: COMMERCIAL

## 2023-11-30 VITALS
RESPIRATION RATE: 18 BRPM | OXYGEN SATURATION: 96 % | TEMPERATURE: 97.9 F | DIASTOLIC BLOOD PRESSURE: 77 MMHG | HEART RATE: 69 BPM | SYSTOLIC BLOOD PRESSURE: 128 MMHG

## 2023-11-30 DIAGNOSIS — S09.90XA CLOSED HEAD INJURY, INITIAL ENCOUNTER: ICD-10-CM
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
## 2023-11-30 LAB
ABO + RH BLD: NORMAL
ALBUMIN SERPL-MCNC: 4.3 G/DL (ref 3.5–5.2)
ALP SERPL-CCNC: 107 U/L (ref 35–104)
ALT SERPL-CCNC: 16 U/L (ref 0–32)
AMPHET UR QL SCN: NEGATIVE
ANION GAP SERPL CALCULATED.3IONS-SCNC: 13 MMOL/L (ref 7–16)
APAP SERPL-MCNC: <5 UG/ML (ref 10–30)
APAP SERPL-MCNC: <5 UG/ML (ref 10–30)
ARM BAND NUMBER: NORMAL
AST SERPL-CCNC: 15 U/L (ref 0–31)
B.E.: 2.1 MMOL/L (ref -3–3)
BARBITURATES UR QL SCN: NEGATIVE
BENZODIAZ UR QL: NEGATIVE
BILIRUB SERPL-MCNC: 0.2 MG/DL (ref 0–1.2)
BLOOD BANK SAMPLE EXPIRATION: NORMAL
BLOOD GROUP ANTIBODIES SERPL: NEGATIVE
BUN SERPL-MCNC: 10 MG/DL (ref 6–20)
BUPRENORPHINE UR QL: NEGATIVE
CALCIUM SERPL-MCNC: 9.4 MG/DL (ref 8.6–10.2)
CANNABINOIDS UR QL SCN: NEGATIVE
CHLORIDE SERPL-SCNC: 104 MMOL/L (ref 98–107)
CO2 SERPL-SCNC: 25 MMOL/L (ref 22–29)
COCAINE UR QL SCN: NEGATIVE
COHB: 0.9 % (ref 0–1.5)
CREAT SERPL-MCNC: 0.9 MG/DL (ref 0.5–1)
CRITICAL: ABNORMAL
DATE ANALYZED: ABNORMAL
DATE OF COLLECTION: ABNORMAL
ERYTHROCYTE [DISTWIDTH] IN BLOOD BY AUTOMATED COUNT: 12.4 % (ref 11.5–15)
ETHANOLAMINE SERPL-MCNC: <10 MG/DL
ETHANOLAMINE SERPL-MCNC: <10 MG/DL
FENTANYL UR QL: NEGATIVE
GFR SERPL CREATININE-BSD FRML MDRD: >60 ML/MIN/1.73M2
GLUCOSE SERPL-MCNC: 105 MG/DL (ref 74–99)
HCO3: 26.5 MMOL/L (ref 22–26)
HCT VFR BLD AUTO: 42.2 % (ref 34–48)
HGB BLD-MCNC: 13.7 G/DL (ref 11.5–15.5)
HHB: 17.5 % (ref 0–5)
INR PPP: 1
LAB: ABNORMAL
LACTATE BLDV-SCNC: 1.3 MMOL/L (ref 0.5–2.2)
Lab: 953
MCH RBC QN AUTO: 28.4 PG (ref 26–35)
MCHC RBC AUTO-ENTMCNC: 32.5 G/DL (ref 32–34.5)
MCV RBC AUTO: 87.6 FL (ref 80–99.9)
METHADONE UR QL: NEGATIVE
METHB: 0.1 % (ref 0–1.5)
MODE: ABNORMAL
O2 CONTENT: 16.3 ML/DL
O2 SATURATION: 82.3 % (ref 92–98.5)
O2HB: 81.5 % (ref 94–97)
OPERATOR ID: 7490
OPIATES UR QL SCN: NEGATIVE
OXYCODONE UR QL SCN: NEGATIVE
PARTIAL THROMBOPLASTIN TIME: 33.9 SEC (ref 24.5–35.1)
PATIENT TEMP: 37 C
PCO2: 40.9 MMHG (ref 35–45)
PCP UR QL SCN: NEGATIVE
PH BLOOD GAS: 7.43 (ref 7.35–7.45)
PLATELET # BLD AUTO: 256 K/UL (ref 130–450)
PMV BLD AUTO: 10.4 FL (ref 7–12)
PO2: 71.9 MMHG (ref 75–100)
POTASSIUM SERPL-SCNC: 3.66 MMOL/L (ref 3.5–5)
POTASSIUM SERPL-SCNC: 4 MMOL/L (ref 3.5–5)
PROT SERPL-MCNC: 7.4 G/DL (ref 6.4–8.3)
PROTHROMBIN TIME: 11.2 SEC (ref 9.3–12.4)
RBC # BLD AUTO: 4.82 M/UL (ref 3.5–5.5)
SALICYLATES SERPL-MCNC: <0.3 MG/DL (ref 0–30)
SALICYLATES SERPL-MCNC: <0.3 MG/DL (ref 0–30)
SODIUM SERPL-SCNC: 142 MMOL/L (ref 132–146)
SOURCE, BLOOD GAS: ABNORMAL
TEST INFORMATION: NORMAL
THB: 14.2 G/DL (ref 11.5–16.5)
TIME ANALYZED: 954
TOXIC TRICYCLIC SC,BLOOD: NEGATIVE
TOXIC TRICYCLIC SC,BLOOD: NEGATIVE
WBC OTHER # BLD: 8.4 K/UL (ref 4.5–11.5)

## 2023-11-30 PROCEDURE — 80143 DRUG ASSAY ACETAMINOPHEN: CPT

## 2023-11-30 PROCEDURE — 6370000000 HC RX 637 (ALT 250 FOR IP): Performed by: EMERGENCY MEDICINE

## 2023-11-30 PROCEDURE — 72125 CT NECK SPINE W/O DYE: CPT

## 2023-11-30 PROCEDURE — 80179 DRUG ASSAY SALICYLATE: CPT

## 2023-11-30 PROCEDURE — 85610 PROTHROMBIN TIME: CPT

## 2023-11-30 PROCEDURE — 70450 CT HEAD/BRAIN W/O DYE: CPT

## 2023-11-30 PROCEDURE — 84132 ASSAY OF SERUM POTASSIUM: CPT

## 2023-11-30 PROCEDURE — G0480 DRUG TEST DEF 1-7 CLASSES: HCPCS

## 2023-11-30 PROCEDURE — 82805 BLOOD GASES W/O2 SATURATION: CPT

## 2023-11-30 PROCEDURE — 80307 DRUG TEST PRSMV CHEM ANLYZR: CPT

## 2023-11-30 PROCEDURE — 80053 COMPREHEN METABOLIC PANEL: CPT

## 2023-11-30 PROCEDURE — 85027 COMPLETE CBC AUTOMATED: CPT

## 2023-11-30 PROCEDURE — 85730 THROMBOPLASTIN TIME PARTIAL: CPT

## 2023-11-30 PROCEDURE — 6810039000 HC L1 TRAUMA ALERT

## 2023-11-30 PROCEDURE — 83605 ASSAY OF LACTIC ACID: CPT

## 2023-11-30 PROCEDURE — 99285 EMERGENCY DEPT VISIT HI MDM: CPT

## 2023-11-30 PROCEDURE — 86900 BLOOD TYPING SEROLOGIC ABO: CPT

## 2023-11-30 PROCEDURE — 71045 X-RAY EXAM CHEST 1 VIEW: CPT

## 2023-11-30 PROCEDURE — 86901 BLOOD TYPING SEROLOGIC RH(D): CPT

## 2023-11-30 PROCEDURE — 86850 RBC ANTIBODY SCREEN: CPT

## 2023-11-30 PROCEDURE — 6360000004 HC RX CONTRAST MEDICATION: Performed by: RADIOLOGY

## 2023-11-30 PROCEDURE — 72170 X-RAY EXAM OF PELVIS: CPT

## 2023-11-30 PROCEDURE — 74177 CT ABD & PELVIS W/CONTRAST: CPT

## 2023-11-30 RX ORDER — ACETAMINOPHEN 500 MG
1000 TABLET ORAL ONCE
Status: COMPLETED | OUTPATIENT
Start: 2023-11-30 | End: 2023-11-30

## 2023-11-30 RX ORDER — LORAZEPAM 0.5 MG/1
0.5 TABLET ORAL ONCE
Status: COMPLETED | OUTPATIENT
Start: 2023-11-30 | End: 2023-11-30

## 2023-11-30 RX ADMIN — IOPAMIDOL 75 ML: 755 INJECTION, SOLUTION INTRAVENOUS at 10:14

## 2023-11-30 RX ADMIN — LORAZEPAM 0.5 MG: 0.5 TABLET ORAL at 12:19

## 2023-11-30 RX ADMIN — ACETAMINOPHEN 1000 MG: 500 TABLET ORAL at 12:19

## 2023-11-30 ASSESSMENT — PAIN DESCRIPTION - LOCATION: LOCATION: HEAD

## 2023-11-30 ASSESSMENT — PAIN DESCRIPTION - DESCRIPTORS: DESCRIPTORS: ACHING;PRESSURE;POUNDING

## 2023-11-30 ASSESSMENT — PAIN SCALES - GENERAL: PAINLEVEL_OUTOF10: 6
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
## 2023-11-30 NOTE — PROGRESS NOTES
Cervical Spine C Collar Clearance -  Patient CT Spine Imaging normal.  Patient does not complain of Cervical Spine tenderness upon palpation. Patients C-Spine ranged. C-spine clear, no need for C-Collar.     Electronically signed by Sung Cunningham DO on 11/30/2023 at 12:52 PM

## 2023-11-30 NOTE — ED NOTES
Patient log rolled to right side.  No complaints of pain or signs of deformity     Natividad Bradford RN  11/30/23 [id number]

## 2023-11-30 NOTE — ED NOTES
Pt ambulated to bathroom as stand by assist. Pt denies SOB, dizziness, or any pain with ambulation.       Augustine Hayes RN  11/30/23 [id number]

## 2023-11-30 NOTE — ED NOTES
Radiology Procedure Waiver   Name: Abilio Fenton  : 1975  MRN: [id number]    Date:  23    Time: 10:04 AM EST    Benefits of immediately proceeding with Radiology exam(s) without pre-testing outweigh the risks or are not indicated as specified below and therefore the following is/are being waived:    [] Pregnancy test   [] Patients LMP on-time and regular.   [] Patient had Tubal Ligation or has other Contraception Device. [] Patient  is Menopausal or Premenarcheal.    [] Patient had Full or Partial Hysterectomy. [] Protocol for Iodine allergy    [] MRI Questionnaire     [x] BUN/Creatinine   [] Patient age w/no hx of renal dysfunction. [] Patient on Dialysis. [] Recent Normal Labs.   Electronically signed by Iraida Martinez MD on 23 at 10:04 AM EST          EMERGENT     Iraida Martinez MD  23 [id number]

## 2023-12-04 ENCOUNTER — HOSPITAL ENCOUNTER (OUTPATIENT)
Dept: PSYCHIATRY | Age: 48
Setting detail: THERAPIES SERIES
Discharge: HOME | End: 2023-12-04
Payer: COMMERCIAL

## 2023-12-04 PROCEDURE — H2020 THER BEHAV SVC, PER DIEM: HCPCS

## 2023-12-04 PROCEDURE — 99214 OFFICE O/P EST MOD 30 MIN: CPT | Performed by: PSYCHIATRY & NEUROLOGY

## 2023-12-04 RX ORDER — ARIPIPRAZOLE 10 MG/1
10 TABLET ORAL NIGHTLY
Qty: 30 TABLET | Refills: 0 | Status: SHIPPED | OUTPATIENT
Start: 2023-12-04

## 2023-12-04 RX ORDER — BUSPIRONE HYDROCHLORIDE 10 MG/1
20 TABLET ORAL 2 TIMES DAILY
Qty: 120 TABLET | Refills: 0 | Status: SHIPPED | OUTPATIENT
Start: 2023-12-04

## 2023-12-04 RX ORDER — DULOXETIN HYDROCHLORIDE 60 MG/1
60 CAPSULE, DELAYED RELEASE ORAL 2 TIMES DAILY
Qty: 60 CAPSULE | Refills: 0 | Status: SHIPPED | OUTPATIENT
Start: 2023-12-04

## 2023-12-04 RX ORDER — RAMELTEON 8 MG/1
TABLET ORAL
Qty: 30 TABLET | Refills: 0 | Status: SHIPPED | OUTPATIENT
Start: 2023-12-04

## 2023-12-04 NOTE — PLAN OF CARE
Date: 12/04/2023     Group Start Time: 10:45 AM  Group End Time: 12:00 PM  Number of participants: 7     Type of group: Psychoeducation  YZ 7S [facility]    RENETTA Aguero      Mode of intervention: Education, Support, Socialization, Exploration, Clarifying, and Problem-solving     Topic: Holiday Decorating, Review of Behavioral Activation and Importance of living in line with our values. Objective: Pt will be able to identify benefits of recreational therapy and demonstrate willingness to engage in holiday decorating activity. Group members will participate in discussion regarding how engaging in pleasurable and productive activities can have positive benefits on mood and self esteem. Group also explored how living in line with our values can improve mood and self esteem. Group Therapy Note:  Pt actively participated in the group activity and discussion. She reports some improvement in mood following participation in group discussion and activity. She was interactive in the group, engaged with out difficulty, was able to share own experiences, and was open to learning new information. Status after intervention:Unchanged  Participation level: Active Listener and Interactive  Participation Quality: Appropriate, Attentive, and Sharing  Speech: normal  Thought Process/Content:Logical and linear and organized  Affect:congruent  Mood: Anxious and depressed  Self report: None Reported  Response to learning: Able to verbalize current knowledge/experience and Able to verbalize/acknowledge new learning,  able to change behaviors, and progressing to goal.     Discipline Responsible: /Counselor     [x] Check in completed and reviewed. Intervention required.  No
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
## 2023-12-05 ENCOUNTER — HOSPITAL ENCOUNTER (OUTPATIENT)
Dept: PSYCHIATRY | Age: 48
Setting detail: THERAPIES SERIES
Discharge: HOME | End: 2023-12-05
Payer: COMMERCIAL

## 2023-12-05 PROCEDURE — H2020 THER BEHAV SVC, PER DIEM: HCPCS
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
## 2023-12-05 NOTE — PLAN OF CARE
Date: 12/05/2023     Group Start Time: 10:45 AM  Group End Time: 12:00 PM  Number of participants: 11     Type of group: Psychoeducation  SEYZ 7S [employer]    RENETTA Aguero      Mode of intervention: Education, Support, Socialization, Exploration, Clarifying, and Problem-solving     Topic: 25 Things in Our Control, Affirmations, Growth mindset     Objective: Pt will be able to identify things within their control that they can do more of to assist in recovery of depression and anxiety. They will be agreeable to use affirmations to assist in development of more positive thinking. Group Therapy Note:  Pt actively participated in the group activity and discussion. She reports some improvement in mood following participation in group discussion and activity. She would like to focus on present moment and wants to work on getting into [employer] program. She was interactive in the group, engaged with out difficulty, was able to share own experiences, and was open to learning new information. Status after intervention:Unchanged  Participation level: Active Listener and Interactive  Participation Quality: Appropriate, Attentive, and Sharing  Speech: normal  Thought Process/Content:Logical and linear and organized  Affect:congruent  Mood: Anxious and depressed  Self report: None Reported  Response to learning: Able to verbalize current knowledge/experience and Able to verbalize/acknowledge new learning,  and progressing to goal.     Discipline Responsible: /Counselor     [x] Check in completed and reviewed. Intervention required.  No

## 2023-12-12 ENCOUNTER — HOSPITAL ENCOUNTER (OUTPATIENT)
Dept: PSYCHIATRY | Age: 48
Setting detail: THERAPIES SERIES
Discharge: HOME | End: 2023-12-12
Payer: COMMERCIAL

## 2023-12-12 PROCEDURE — H2020 THER BEHAV SVC, PER DIEM: HCPCS

## 2023-12-12 NOTE — PLAN OF CARE
Group Therapy Note      Date: 12/12/2023     Group Start Time:  8:45 AM  Group End Time: 10:30 AM  Number of participants: 6     SEYZ 7S  [facility]   RENETTA Carrasco     Type of Group: Psychotherapy     Topic of Group: Daily check in, identification of stressors, and how they impact depression and anxiety. Review of Luan Chi and Deep breathing exercises. Patient's Goal:  To increase socialization, improve communication of feelings, and learn healthy coping skills. Notes:  Pt was an active participant in group focusing on expression of feelings related to stressors and how they impact mental health symptoms. Themes of group centered on current symptom presentation, interpersonal relationships, communication, and coping skills. Group members shared personal experiences of how their declining physical health and functioning has caused increased depression and anxiety. Social Workers led group members in [employer] and deep breathing exercises. She shared that she continues to struggle with depression but that she found out that she can get truck fixed so is relieved about that. She discussed son and gfs break up and that she decided to not say anything about that and group shared that son may need increased support and we challenged her not to distance herself from him but to reach out to him which she agreed to do. She made positive connections with others through sharing personal info and providing support to others. Pt reported some improvement in mood following participation in groups. Status After Intervention: Improved     Participation Level:  Active Listener and Interactive     Participation Quality: Appropriate, Attentive, Sharing, and Supportive      Speech:  normal      Thought Process/Content: Logical, linear and organized     Affective Functioning: congruent     Mood: Depressed and anxious     Level of consciousness:  Alert, Oriented x4, and Attentive      Response to

## 2023-12-12 NOTE — GROUP NOTE
Group Therapy Note    Date: 12/12/2023    Group Start Time: 10:45 AM  Group End Time: 12:00 PM  Group Topic: Psychoeducation    SEYZ 7S OP BH    EVGENY Small, \[facility]\""    Topic: What is your definition of Spirituality  Mode of intervention: existential factors, self understanding, catharsis, altruism, instillation of hope and imparting of information. Note: Janak Rahman notes water as a cleansing agent as in taking a shower, she focuses on positive thoughts and energy. She believes we all here for a reason. Status after intervention:Improved  Participation level: Active Listener and Interactive  Participation Quality: Appropriate, Attentive, and Sharing  Speech: normal  Thought Process/Content:Logical  Mood/Affect: calm and congruent  Self report: None Reported  Response to learning: Able to verbalize current knowledge/experience, Able to verbalize/acknowledge new learning, Able to retain information, and Capable of insight  Discipline Responsible: /Counselor    [x] Check in completed and reviewed. Intervention required.  no          Signature:  EVGENY Small, [place]

## 2023-12-13 ENCOUNTER — OFFICE VISIT (OUTPATIENT)
Dept: PRIMARY CARE CLINIC | Age: 48
End: 2023-12-13
Payer: COMMERCIAL

## 2023-12-13 VITALS
DIASTOLIC BLOOD PRESSURE: 74 MMHG | BODY MASS INDEX: 31.24 KG/M2 | SYSTOLIC BLOOD PRESSURE: 112 MMHG | TEMPERATURE: 97.1 F | HEIGHT: 64 IN | WEIGHT: 183 LBS | HEART RATE: 78 BPM | OXYGEN SATURATION: 95 %

## 2023-12-13 DIAGNOSIS — M77.12 LATERAL EPICONDYLITIS OF LEFT ELBOW: Primary | ICD-10-CM

## 2023-12-13 DIAGNOSIS — M79.7 FIBROMYALGIA: Chronic | ICD-10-CM

## 2023-12-13 PROBLEM — M77.10 TENNIS ELBOW SYNDROME: Status: ACTIVE | Noted: 2023-12-13
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
## 2023-12-13 PROCEDURE — G8417 CALC BMI ABV UP PARAM F/U: HCPCS | Performed by: INTERNAL MEDICINE

## 2023-12-13 PROCEDURE — 3017F COLORECTAL CA SCREEN DOC REV: CPT | Performed by: INTERNAL MEDICINE

## 2023-12-13 PROCEDURE — G8427 DOCREV CUR MEDS BY ELIG CLIN: HCPCS | Performed by: INTERNAL MEDICINE

## 2023-12-13 PROCEDURE — G8484 FLU IMMUNIZE NO ADMIN: HCPCS | Performed by: INTERNAL MEDICINE

## 2023-12-13 PROCEDURE — 1036F TOBACCO NON-USER: CPT | Performed by: INTERNAL MEDICINE

## 2023-12-13 PROCEDURE — 99213 OFFICE O/P EST LOW 20 MIN: CPT | Performed by: INTERNAL MEDICINE

## 2023-12-13 RX ORDER — HYDROXYZINE HYDROCHLORIDE 25 MG/1
1 TABLET, FILM COATED ORAL 2 TIMES DAILY PRN
COMMUNITY

## 2023-12-13 RX ORDER — MELOXICAM 15 MG/1
15 TABLET ORAL DAILY
Qty: 90 TABLET | Refills: 1 | Status: SHIPPED | OUTPATIENT
Start: 2023-12-13

## 2023-12-13 RX ORDER — METHYLPREDNISOLONE 4 MG/1
TABLET ORAL
Qty: 1 KIT | Refills: 0 | Status: SHIPPED | OUTPATIENT
Start: 2023-12-13 | End: 2023-12-19

## 2023-12-13 RX ORDER — DULOXETIN HYDROCHLORIDE 30 MG/1
30 CAPSULE, DELAYED RELEASE ORAL 2 TIMES DAILY
Qty: 60 CAPSULE | Refills: 0
Start: 2023-12-13
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
## 2023-12-14 ENCOUNTER — HOSPITAL ENCOUNTER (OUTPATIENT)
Dept: PSYCHIATRY | Age: 48
Setting detail: THERAPIES SERIES
Discharge: HOME | End: 2023-12-14
Payer: COMMERCIAL

## 2023-12-14 DIAGNOSIS — F33.2 SEVERE EPISODE OF RECURRENT MAJOR DEPRESSIVE DISORDER, WITHOUT PSYCHOTIC FEATURES (HCC): Primary | ICD-10-CM

## 2023-12-14 PROCEDURE — H2020 THER BEHAV SVC, PER DIEM: HCPCS

## 2023-12-14 NOTE — GROUP NOTE
Group Therapy Note    Date: 12/14/2023    Group Start Time: 10:45 AM  Group End Time: 12:00 PM  Group Topic: Psychoeducation    SEYZ 7S OP BH    Marilee Paniagua LISW-S          Number of participants:6  Type of group: Psychoeducation  Mode of intervention: Education, Support, Socialization, Exploration, and Problem-solving    Group start time: 1045 am  Group end time: 1200 pm    Type of Group: Psychoeducation  Topic: Self care tips, self care assessment, and how to practice self compassion. Patient's Goal: Pt will be able to participate in group discussion regarding how to deal with practicing self care which will increase positive self esteem and positive coping skills. Notes: Pt participated actively in group discussion regarding importance of self care and how to implement self care activities. She reports she needs to work on: recognizing her own strengths and achievements, asking others for help, and spending more time with her children as they give her life meaning. She reports some improvement in mood following participation in groups. She engaged without difficulty, was able to share own experiences, and was open to learning new information. Status after intervention:Improved  Participation level: Active Listener and Interactive  Participation Quality: Appropriate, Attentive, Sharing, and Supportive  Speech: normal  Thought Process/Content:Logical  Linear  Mood/Affect: anxious and depressed  Self report: None Reported  Response to learning: Able to verbalize current knowledge/experience, Able to verbalize/acknowledge new learning, Able to retain information, Capable of insight, Able to change behavior, and Progressing to goal  Discipline Responsible: /Counselor    [x] Check in completed and reviewed. Intervention required.  no      Signature:  JONEL Ford
Clarifying, and Problem-solving     Check in Reviewed: Yes     Intervention Required: No                       Signature:  JONEL Rudolph

## 2023-12-20 ENCOUNTER — SOCIAL WORK (OUTPATIENT)
Dept: PSYCHIATRY | Age: 48
End: 2023-12-20

## 2023-12-20 NOTE — CARE COORDINATION
Date: 12/19/23  Present:   FERNANDO Bazan, Dr. Ortega, Derick JACKSON; STAR Medina     Current Status:  IOP     Treatment goals:    [x] Decrease anxiety  [x] Improve mood  [x] Increase positive supports  [x] Improve positive coping skills  [x] Other: medication management     Recommendation:   Pt is participating in  IOP inconsistently. Recently she has had a transportation issue. Per the team she has met her maximum benefits and can be discharged. She will follow up with [facility].  Electronically signed by STAR Medina on 12/20/2023 at 3:36 PM

## 2023-12-21 ENCOUNTER — SOCIAL WORK (OUTPATIENT)
Dept: PSYCHIATRY | Age: 48
End: 2023-12-21

## 2023-12-21 NOTE — CARE COORDINATION
12/21/23    Pt did not attend  IOP today. Left message and informed pt of Holiday schedule.    Electronically signed by STAR Medina on 12/21/2023 at 3:15 PM

## 2023-12-27 ENCOUNTER — SOCIAL WORK (OUTPATIENT)
Dept: PSYCHIATRY | Age: 48
End: 2023-12-27

## 2023-12-27 NOTE — CARE COORDINATION
12/27/23    Discharge note send to [facility], pt to follow up there for counseling and medications. A referral was also sent to Workforce Development ([facility]) for employment on 12/8/23    Electronically signed by STAR Medina on 12/27/2023 at 2:48 PM

## 2023-12-27 NOTE — CARE COORDINATION
12/27/23  (Message per [facility] Coordinator)  Gualberto Schwartz called on 12/26/23 to report that she would not be returning to IOP as she knows her discharge date was at the end of this month. She also said she is unsure of her transportation still and cannot finish out remaining dates of IOP. Per the treatment team pt can be discharged from the program. She follows with [facility] for medication management and counseling. Discharge progress to follow.    Electronically signed by STAR Medina on 12/27/2023 at 2:23 PM

## 2024-01-10 RX ORDER — ARIPIPRAZOLE 10 MG/1
10 TABLET ORAL NIGHTLY
Qty: 30 TABLET | Refills: 0 | OUTPATIENT
Start: 2024-01-10

## 2024-01-30 ENCOUNTER — OFFICE VISIT (OUTPATIENT)
Dept: PRIMARY CARE CLINIC | Age: 49
End: 2024-01-30
Payer: COMMERCIAL

## 2024-01-30 VITALS
SYSTOLIC BLOOD PRESSURE: 120 MMHG | TEMPERATURE: 97.6 F | WEIGHT: 189 LBS | OXYGEN SATURATION: 98 % | HEIGHT: 64 IN | DIASTOLIC BLOOD PRESSURE: 82 MMHG | HEART RATE: 72 BPM | BODY MASS INDEX: 32.27 KG/M2

## 2024-01-30 DIAGNOSIS — M25.551 PAIN OF RIGHT HIP: Primary | ICD-10-CM

## 2024-01-30 DIAGNOSIS — F33.2 SEVERE EPISODE OF RECURRENT MAJOR DEPRESSIVE DISORDER, WITHOUT PSYCHOTIC FEATURES (HCC): ICD-10-CM

## 2024-01-30 PROCEDURE — 1036F TOBACCO NON-USER: CPT | Performed by: INTERNAL MEDICINE

## 2024-01-30 PROCEDURE — G8484 FLU IMMUNIZE NO ADMIN: HCPCS | Performed by: INTERNAL MEDICINE

## 2024-01-30 PROCEDURE — 99213 OFFICE O/P EST LOW 20 MIN: CPT | Performed by: INTERNAL MEDICINE

## 2024-01-30 PROCEDURE — G8417 CALC BMI ABV UP PARAM F/U: HCPCS | Performed by: INTERNAL MEDICINE

## 2024-01-30 PROCEDURE — G8427 DOCREV CUR MEDS BY ELIG CLIN: HCPCS | Performed by: INTERNAL MEDICINE

## 2024-01-30 PROCEDURE — 3017F COLORECTAL CA SCREEN DOC REV: CPT | Performed by: INTERNAL MEDICINE

## 2024-01-30 RX ORDER — PREDNISONE 10 MG/1
TABLET ORAL
Qty: 20 TABLET | Refills: 0 | Status: SHIPPED | OUTPATIENT
Start: 2024-01-30

## 2024-01-30 RX ORDER — DESVENLAFAXINE 25 MG/1
25 TABLET, EXTENDED RELEASE ORAL DAILY
COMMUNITY
Start: 2024-01-24

## 2024-01-30 RX ORDER — DULOXETIN HYDROCHLORIDE 60 MG/1
120 CAPSULE, DELAYED RELEASE ORAL DAILY
COMMUNITY
Start: 2024-01-10

## 2024-01-30 RX ORDER — HYDROXYZINE PAMOATE 25 MG/1
25 CAPSULE ORAL 3 TIMES DAILY PRN
COMMUNITY
Start: 2024-01-24

## 2024-01-30 ASSESSMENT — PATIENT HEALTH QUESTIONNAIRE - PHQ9
SUM OF ALL RESPONSES TO PHQ QUESTIONS 1-9: 0
5. POOR APPETITE OR OVEREATING: 0
7. TROUBLE CONCENTRATING ON THINGS, SUCH AS READING THE NEWSPAPER OR WATCHING TELEVISION: 0
8. MOVING OR SPEAKING SO SLOWLY THAT OTHER PEOPLE COULD HAVE NOTICED. OR THE OPPOSITE, BEING SO FIGETY OR RESTLESS THAT YOU HAVE BEEN MOVING AROUND A LOT MORE THAN USUAL: 0
SUM OF ALL RESPONSES TO PHQ QUESTIONS 1-9: 0
2. FEELING DOWN, DEPRESSED OR HOPELESS: 0
4. FEELING TIRED OR HAVING LITTLE ENERGY: 0
3. TROUBLE FALLING OR STAYING ASLEEP: 0
10. IF YOU CHECKED OFF ANY PROBLEMS, HOW DIFFICULT HAVE THESE PROBLEMS MADE IT FOR YOU TO DO YOUR WORK, TAKE CARE OF THINGS AT HOME, OR GET ALONG WITH OTHER PEOPLE: 0
SUM OF ALL RESPONSES TO PHQ9 QUESTIONS 1 & 2: 0
9. THOUGHTS THAT YOU WOULD BE BETTER OFF DEAD, OR OF HURTING YOURSELF: 0
SUM OF ALL RESPONSES TO PHQ QUESTIONS 1-9: 0
1. LITTLE INTEREST OR PLEASURE IN DOING THINGS: 0
6. FEELING BAD ABOUT YOURSELF - OR THAT YOU ARE A FAILURE OR HAVE LET YOURSELF OR YOUR FAMILY DOWN: 0
SUM OF ALL RESPONSES TO PHQ QUESTIONS 1-9: 0

## 2024-01-30 NOTE — PROGRESS NOTES
Chief Complaint   Patient presents with    Hip Pain     Pt is an acute with hip pain x4 days w/ numbness and tingling down right leg. Pt states this pain starts in the groin.        HPI:  Patient is here  complains of right hip pain that started few days ago after she has been cleaning her house and bending over a lot.  She describes the pain as starting on the inguinal ligament on the right side radiating to the right hip bursa and the sacroiliac joint on that side.  Pain is worse on walking or bending over..  She has been on meloxicam daily and she is using some Voltaren gel or Biofreeze without much help.  She had this episode about 5 years ago when she received a steroid injection at that time and that has helped her.  We will try her on higher dose of oral steroid..  She would like to be referred to physical rehab medicine.    Past Medical History, Surgical History, and Family History has been reviewed and updated.    Review of Systems:  Constitutional:  No fever, no fatigue, no chills, no headaches, no weight change  Dermatology:  No rash, no mole, no dry or sensitive skin  ENT:  No cough, no sore throat, no sinus pain, no runny nose, no ear pain  Cardiology:  No chest pain, no palpitations, no leg edema, no shortness of breath, no PND  Gastroenterology:  No dysphagia, no abdominal pain, no nausea, no vomiting, no constipation, no diarrhea, no heartburn  Musculoskeletal:  No joint pain, no leg cramps, no back pain, no muscle aches  Respiratory:  No shortness of breath, no orthopnea, no wheezing, no KHALIL, no hemoptysis  Urology:  No blood in the urine, no urinary frequency, no urinary incontinence, no urinary urgency, no nocturia, no dysuria    Vitals:    01/30/24 1057   BP: 120/82   Pulse: 72   Temp: 97.6 °F (36.4 °C)   TempSrc: Temporal   SpO2: 98%   Weight: 85.7 kg (189 lb)   Height: 1.613 m (5' 3.5\")       General:  Patient alert and oriented x 3, NAD, pleasant  HEENT:  Atraumatic, normocephalic, PERRLA,

## 2024-02-09 ENCOUNTER — OFFICE VISIT (OUTPATIENT)
Dept: PHYSICAL MEDICINE AND REHAB | Age: 49
End: 2024-02-09

## 2024-02-09 VITALS
WEIGHT: 189 LBS | HEIGHT: 63 IN | BODY MASS INDEX: 33.49 KG/M2 | DIASTOLIC BLOOD PRESSURE: 86 MMHG | SYSTOLIC BLOOD PRESSURE: 139 MMHG | HEART RATE: 89 BPM

## 2024-02-09 DIAGNOSIS — M25.851 FEMOROACETABULAR IMPINGEMENT OF RIGHT HIP: ICD-10-CM

## 2024-02-09 DIAGNOSIS — M79.609 PAIN IN EXTREMITY, UNSPECIFIED EXTREMITY: Primary | ICD-10-CM

## 2024-02-09 RX ORDER — TRIAMCINOLONE ACETONIDE 40 MG/ML
40 INJECTION, SUSPENSION INTRA-ARTICULAR; INTRAMUSCULAR ONCE
Status: COMPLETED | OUTPATIENT
Start: 2024-02-09 | End: 2024-02-09

## 2024-02-09 RX ORDER — LIDOCAINE HYDROCHLORIDE 10 MG/ML
8 INJECTION, SOLUTION EPIDURAL; INFILTRATION; INTRACAUDAL; PERINEURAL ONCE
Status: COMPLETED | OUTPATIENT
Start: 2024-02-09 | End: 2024-02-09

## 2024-02-09 RX ORDER — IBUPROFEN 400 MG/1
400 TABLET ORAL EVERY 8 HOURS PRN
Qty: 90 TABLET | Refills: 0 | Status: SHIPPED | OUTPATIENT
Start: 2024-02-09 | End: 2024-03-10

## 2024-02-09 RX ADMIN — TRIAMCINOLONE ACETONIDE 40 MG: 40 INJECTION, SUSPENSION INTRA-ARTICULAR; INTRAMUSCULAR at 12:16

## 2024-02-09 RX ADMIN — LIDOCAINE HYDROCHLORIDE 8 ML: 10 INJECTION, SOLUTION EPIDURAL; INFILTRATION; INTRACAUDAL; PERINEURAL at 12:16

## 2024-02-09 NOTE — PROGRESS NOTES
alternatives of a right hip joint injection, the patient agreed to proceed.  A permit was signed and scanned into the chart.   The skin on the anterior hip was prepared with Chloraprep.  Ethyl Chloride vapocoolant spray was used for local anesthesia.  Using aseptic, no touch technique, a 22 gauge, 3\" needle with 1 cc of Kenalog 40mg/cc and 7 cc of 1% lidocaine was directed to the hip joint using US guidance.  After negative aspiration, the medication was injected into the hip joint.  Adequate hemostasis was achieved and a bandage was applied.  The patient was monitored clinically after the injection and left the office without incident. The patient tolerated the procedure well and was educated in post injection care. There was post injection reduction in pain. US images are uploaded separately.      Awilda Raymundo D.O., P.T.  Board Certified Physical Medicine and Rehabilitation  Board Certified Electrodiagnostic Medicine    Administrations This Visit       lidocaine PF 1 % injection 8 mL       Admin Date  02/09/2024  12:16 Action  Given Dose  8 mL Route  IntraDERmal Site  Other Administered By  Viktoriya Bauer MA    Ordering Provider: Awilda Raymundo DO    NDC: 3570-3992-14    Lot#: [id number]    : HOSPIRA    Patient Supplied?: No              triamcinolone acetonide (KENALOG-40) injection 40 mg       Admin Date  02/09/2024  12:16 Action  Given Dose  40 mg Route  IntraMUSCular Site  Other Administered By  Viktoriya Bauer MA    Ordering Provider: Awilda Raymundo DO    NDC: 1123-9134-64    Lot#: [id number]    : B-M SQUIBB [facility].S. (PRIMARY CARE)    Patient Supplied?: No

## 2024-02-20 ENCOUNTER — TELEPHONE (OUTPATIENT)
Dept: PHYSICAL MEDICINE AND REHAB | Age: 49
End: 2024-02-20

## 2024-02-20 NOTE — TELEPHONE ENCOUNTER
Called patient for follow up phone call post right hip injection from 02/09/2024, states she received only 20%effectiveness didn't really help, wasn't sure if she should come back in office for follow up or any suggestions for treatment plan??

## 2024-02-26 ENCOUNTER — TELEPHONE (OUTPATIENT)
Dept: PHYSICAL MEDICINE AND REHAB | Age: 49
End: 2024-02-26

## 2024-02-26 ENCOUNTER — OFFICE VISIT (OUTPATIENT)
Dept: PHYSICAL MEDICINE AND REHAB | Age: 49
End: 2024-02-26
Payer: COMMERCIAL

## 2024-02-26 VITALS
BODY MASS INDEX: 32.96 KG/M2 | HEIGHT: 63 IN | SYSTOLIC BLOOD PRESSURE: 130 MMHG | DIASTOLIC BLOOD PRESSURE: 82 MMHG | WEIGHT: 186 LBS | HEART RATE: 60 BPM

## 2024-02-26 DIAGNOSIS — M25.851 FEMOROACETABULAR IMPINGEMENT OF RIGHT HIP: Primary | ICD-10-CM

## 2024-02-26 PROCEDURE — 1036F TOBACCO NON-USER: CPT | Performed by: PHYSICAL MEDICINE & REHABILITATION

## 2024-02-26 PROCEDURE — G8417 CALC BMI ABV UP PARAM F/U: HCPCS | Performed by: PHYSICAL MEDICINE & REHABILITATION

## 2024-02-26 PROCEDURE — 3017F COLORECTAL CA SCREEN DOC REV: CPT | Performed by: PHYSICAL MEDICINE & REHABILITATION

## 2024-02-26 PROCEDURE — G8484 FLU IMMUNIZE NO ADMIN: HCPCS | Performed by: PHYSICAL MEDICINE & REHABILITATION

## 2024-02-26 PROCEDURE — 99214 OFFICE O/P EST MOD 30 MIN: CPT | Performed by: PHYSICAL MEDICINE & REHABILITATION

## 2024-02-26 PROCEDURE — G8427 DOCREV CUR MEDS BY ELIG CLIN: HCPCS | Performed by: PHYSICAL MEDICINE & REHABILITATION

## 2024-02-26 RX ORDER — DESVENLAFAXINE SUCCINATE 50 MG/1
TABLET, EXTENDED RELEASE ORAL
COMMUNITY
Start: 2024-02-21

## 2024-02-26 NOTE — PROGRESS NOTES
Awilda Raymundo D.O.  [facility]  [address] [place] [address]  Phone: [phone]  Fax: [phone]    Chief Complaint   Patient presents with    Hip Pain       An independent historian was not needed.    Interval history: Patient had 100% relief of her hip pain with intra-articular injection at last visit, however it only lasted for 3 days and then recurred.     Side effects of treatment: has none.     Symptoms have been worsening.      Today, the location of pain is right hip and the severity is Pain Score:   4.     The quality is sharp, aching.      The frequency is episodic daily.     Alleviating factors include: medication ibuprofen.    Exacerbating factors include:  standing, walking    Functional status:   ADL: Self-care  Mobility: independence Device: None    Exercise: never    Red flags: Not present: history of cancer, pain awakening patient from sleep, night sweats, fevers, unintentional weight loss, immunospuression, saddle anesthesia, new bowel or bladder dysfunction, and osteoporosis.     Associated symptoms: Not present: falls, subjective weakness, paresthesias, hematuria, nausea, vomiting or rash.     Past medical, surgical, family, social history, allergies and medications were otherwise reviewed in Epic.      Physical Exam:   Blood pressure 130/82, pulse 60, height 1.6 m (5' 3\"), weight 84.4 kg (186 lb), last menstrual period 03/23/2016, not currently breastfeeding.   General: No apparent distress.   Habitus: Body mass index is 32.95 kg/m². Class I obesity (BMI = 30.0-34.9)   MSK: Right Hip:  There is no edema, effusion, warmth, mass, deformity or instability of the right hip joint. No tenderness over the right hip joint,  sacroiliac joint,  greater trochanter,  ASIS,  PSIS,  gluteal muscles,  tensor fascia terry,  iliopsoas or  rectus.  Full painless  AROM except end range IR and ER. No crepitus. + Mihir.  Trendelenburg, Scour negative right.

## 2024-02-26 NOTE — TELEPHONE ENCOUNTER
Called and left message on patient voicemail that I was calling to see where she wants to go for Pt.  Will wait for return call from patient.

## 2024-03-08 ENCOUNTER — HOSPITAL ENCOUNTER (OUTPATIENT)
Dept: INTERVENTIONAL RADIOLOGY/VASCULAR | Age: 49
Discharge: HOME | End: 2024-03-08
Payer: COMMERCIAL

## 2024-03-08 ENCOUNTER — HOSPITAL ENCOUNTER (OUTPATIENT)
Dept: MRI IMAGING | Age: 49
End: 2024-03-08
Payer: COMMERCIAL

## 2024-03-08 DIAGNOSIS — D75.9: Primary | ICD-10-CM

## 2024-03-08 DIAGNOSIS — M25.851 FEMOROACETABULAR IMPINGEMENT OF RIGHT HIP: ICD-10-CM

## 2024-03-08 PROCEDURE — A9577 INJ MULTIHANCE: HCPCS | Performed by: RADIOLOGY

## 2024-03-08 PROCEDURE — 6360000004 HC RX CONTRAST MEDICATION: Performed by: RADIOLOGY

## 2024-03-08 PROCEDURE — 27093 INJECTION FOR HIP X-RAY: CPT

## 2024-03-08 PROCEDURE — 73722 MRI JOINT OF LWR EXTR W/DYE: CPT

## 2024-03-08 PROCEDURE — 77002 NEEDLE LOCALIZATION BY XRAY: CPT

## 2024-03-08 RX ORDER — IOPAMIDOL 612 MG/ML
3 INJECTION, SOLUTION INTRATHECAL
Status: COMPLETED | OUTPATIENT
Start: 2024-03-08 | End: 2024-03-08

## 2024-03-08 RX ADMIN — GADOBENATE DIMEGLUMINE 0.1 ML: 529 INJECTION, SOLUTION INTRAVENOUS at 12:41

## 2024-03-08 RX ADMIN — IOPAMIDOL 3 ML: 612 INJECTION, SOLUTION INTRATHECAL at 12:42

## 2024-03-12 ENCOUNTER — TELEPHONE (OUTPATIENT)
Dept: PHYSICAL MEDICINE AND REHAB | Age: 49
End: 2024-03-12

## 2024-03-14 NOTE — TELEPHONE ENCOUNTER
Called and spoke with the patient to inform her that Dr Raymundo stated the MRI hip result showed mild right hip trochanteric bursitis. Patient is aware and voiced understanding.

## 2024-03-20 ENCOUNTER — HOSPITAL ENCOUNTER (OUTPATIENT)
Dept: INFUSION THERAPY | Age: 49
Discharge: HOME | End: 2024-03-20
Payer: COMMERCIAL

## 2024-03-20 ENCOUNTER — OFFICE VISIT (OUTPATIENT)
Dept: ONCOLOGY | Age: 49
End: 2024-03-20

## 2024-03-20 VITALS
TEMPERATURE: 97.3 F | WEIGHT: 186.9 LBS | SYSTOLIC BLOOD PRESSURE: 136 MMHG | OXYGEN SATURATION: 100 % | HEART RATE: 65 BPM | RESPIRATION RATE: 18 BRPM | DIASTOLIC BLOOD PRESSURE: 91 MMHG | BODY MASS INDEX: 33.11 KG/M2

## 2024-03-20 DIAGNOSIS — R89.8 ABNORMAL BONE MARROW EXAMINATION: Primary | ICD-10-CM

## 2024-03-20 DIAGNOSIS — R89.8 ABNORMAL BONE MARROW EXAMINATION: ICD-10-CM

## 2024-03-20 LAB
ALBUMIN SERPL-MCNC: 4 G/DL (ref 3.5–5.2)
ALP SERPL-CCNC: 84 U/L (ref 35–104)
ALT SERPL-CCNC: 11 U/L (ref 0–32)
ANION GAP SERPL CALCULATED.3IONS-SCNC: 14 MMOL/L (ref 7–16)
AST SERPL-CCNC: 11 U/L (ref 0–31)
BASOPHILS # BLD: 0.04 K/UL (ref 0–0.2)
BASOPHILS NFR BLD: 1 % (ref 0–2)
BILIRUB SERPL-MCNC: <0.2 MG/DL (ref 0–1.2)
BUN SERPL-MCNC: 10 MG/DL (ref 6–20)
CALCIUM SERPL-MCNC: 9.5 MG/DL (ref 8.6–10.2)
CHLORIDE SERPL-SCNC: 103 MMOL/L (ref 98–107)
CO2 SERPL-SCNC: 22 MMOL/L (ref 22–29)
CREAT SERPL-MCNC: 0.8 MG/DL (ref 0.5–1)
EOSINOPHIL # BLD: 0.08 K/UL (ref 0.05–0.5)
EOSINOPHILS RELATIVE PERCENT: 1 % (ref 0–6)
ERYTHROCYTE [DISTWIDTH] IN BLOOD BY AUTOMATED COUNT: 13.1 % (ref 11.5–15)
FERRITIN SERPL-MCNC: 8 NG/ML
GFR SERPL CREATININE-BSD FRML MDRD: >60 ML/MIN/1.73M2
GLUCOSE SERPL-MCNC: 90 MG/DL (ref 74–99)
HCT VFR BLD AUTO: 36.2 % (ref 34–48)
HGB BLD-MCNC: 12 G/DL (ref 11.5–15.5)
IMM GRANULOCYTES # BLD AUTO: 0.03 K/UL (ref 0–0.58)
IMM GRANULOCYTES NFR BLD: 0 % (ref 0–5)
IRON SATN MFR SERPL: 5 % (ref 15–50)
IRON SERPL-MCNC: 22 UG/DL (ref 37–145)
LYMPHOCYTES NFR BLD: 1.59 K/UL (ref 1.5–4)
LYMPHOCYTES RELATIVE PERCENT: 19 % (ref 20–42)
MCH RBC QN AUTO: 28.1 PG (ref 26–35)
MCHC RBC AUTO-ENTMCNC: 33.1 G/DL (ref 32–34.5)
MCV RBC AUTO: 84.8 FL (ref 80–99.9)
MONOCYTES NFR BLD: 0.36 K/UL (ref 0.1–0.95)
MONOCYTES NFR BLD: 4 % (ref 2–12)
NEUTROPHILS NFR BLD: 74 % (ref 43–80)
NEUTS SEG NFR BLD: 6.14 K/UL (ref 1.8–7.3)
PLATELET # BLD AUTO: 260 K/UL (ref 130–450)
PMV BLD AUTO: 10.4 FL (ref 7–12)
POTASSIUM SERPL-SCNC: 3.6 MMOL/L (ref 3.5–5)
PROT SERPL-MCNC: 7.1 G/DL (ref 6.4–8.3)
RBC # BLD AUTO: 4.27 M/UL (ref 3.5–5.5)
SEND OUT REPORT: NORMAL
SODIUM SERPL-SCNC: 139 MMOL/L (ref 132–146)
TEST NAME: NORMAL
TIBC SERPL-MCNC: 482 UG/DL (ref 250–450)
WBC OTHER # BLD: 8.2 K/UL (ref 4.5–11.5)

## 2024-03-20 PROCEDURE — 82728 ASSAY OF FERRITIN: CPT

## 2024-03-20 PROCEDURE — 83521 IG LIGHT CHAINS FREE EACH: CPT

## 2024-03-20 PROCEDURE — 84155 ASSAY OF PROTEIN SERUM: CPT

## 2024-03-20 PROCEDURE — 84165 PROTEIN E-PHORESIS SERUM: CPT

## 2024-03-20 PROCEDURE — 36415 COLL VENOUS BLD VENIPUNCTURE: CPT

## 2024-03-20 PROCEDURE — 83550 IRON BINDING TEST: CPT

## 2024-03-20 PROCEDURE — 83540 ASSAY OF IRON: CPT

## 2024-03-20 PROCEDURE — 85025 COMPLETE CBC W/AUTO DIFF WBC: CPT

## 2024-03-20 PROCEDURE — 80053 COMPREHEN METABOLIC PANEL: CPT

## 2024-03-20 SDOH — ECONOMIC STABILITY: HOUSING INSECURITY: PLEASE ASSESS YOUR PATIENT'S LEVEL OF DISTRESS CONCERNING HOUSING (SCALE FROM 1-10): 5

## 2024-03-20 NOTE — PROGRESS NOTES
[facility]  [address]  [place] [address]  Dept: [phone]  Loc: [phone]    Clinic Consultation Note      Date of Encounter: 03/20/2024     Referring Provider:  Awilda Raymundo DO    Reason for Visit:   Nonspecific bone marrow findings on MRI        PCP:  Fidelina Lam MD    Demographics: 48 y.o. female    Chief Complaint   Patient presents with    New Patient     Disorder of hematopoietic system         History of Present Illness of Initial Consultation (03/20/2024):  The patient is a 48 y.o. female who comes in for incident findings of \"nonspecific hematopoietic marrow signal alteration...\" She has chronic right hip pain which she follows PM&R, in which had led to workup with MRI right hip. The patient denies of any fevers, chills, night sweats, adenopathy, bleeding. She is not a smoker.     Her hip pain is mostly on lateral and anterior aspects.         Review of Systems;  CONSTITUTIONAL :  No fever, chills, fatigue, night sweats, or unintended weight loss  EYES: No discharge, itchiness, pain, redness  ENMT: No mouth sores, thrush, sore throat,  dysphagia, hoarseness of voice  RESPIRATORY: No shortness of breath, or cough  CARDIOVASCULAR: No chest pain, palpitations  GASTROINTESTINAL: No nausea, vomiting, abdominal pain,   melena or hematochezia.  GENITOURINARY: No dysuria, urinary frequency, hematuria  ENDOCRINE: No polydipsia, polyuria, cold or heat intolerance.  MUSCULOSKELETAL: Right hip pain  INTEGUMENT.: No skin rash or bruises.  HEM/LYMPHATICS: No adenopathy, bruising or prolonged bleeding  NEURO: No headache, dizziness, syncope/presyncope, focal deficits or loss of balance.         Past Medical History:   Diagnosis Date    Anemia     Anxiety     Arthritis     Asthma     Chronic back pain     DDD (degenerative disc disease)     Depression     Fibromyalgia     Fibromyalgia     IBS (irritable bowel syndrome)     Migraines  
Reorient confused/cognitively impaired patient  5.  Call-light/bell within patient's reach  6.  Chair/bed in low position, stretcher/bed with siderails up except when performing patient care activities  7.  Educate patient/family/caregiver on falls prevention  8.  Falls risk precaution (Yellow sticker Level III) placed on patient chart           MALNUTRITION RISK SCREENING ASSESSMENT    Instructions:  Assess the patient and enter the appropriate indicators that are present for nutrition risk identification. Total the numbers entered and assign a risk score. Follow the appropriate action for total score listed below.     Assessment   Date  3/20/2024     Have you lost weight without trying?      0- No     Have you been eating poorly because of a decreased appetite?         0- No   3. Do you have a diagnosis of head and neck cancer OR will you be receiving neoadjuvant treatment for breast cancer?      0- No                                                                                    TOTAL 0        Score of 0-1: No action  Score 2 or greater:  For Non-Diabetic Patient: Recommend adding Ensure Enlive 2 x daily and provide patient with Ensure wellness bag with coupons  For Diabetic Patient: Recommend adding Glucerna Shake 2 x daily and provide patient with Glucerna Wellness bag with coupons  Route to the dietitian via Leia Carmen RN

## 2024-03-21 ENCOUNTER — TELEPHONE (OUTPATIENT)
Dept: PHYSICAL MEDICINE AND REHAB | Age: 49
End: 2024-03-21

## 2024-03-21 LAB — PATH REV BLD -IMP: NORMAL

## 2024-03-21 NOTE — TELEPHONE ENCOUNTER
----- Message from Awilda Raymundo DO sent at 3/20/2024  5:06 PM EDT -----  Can you find out what this is?  ----- Message -----  From: Alanna Neri Incoming Lab Results From [facility]  Sent: 3/20/2024   1:44 PM EDT  To: Awilda Raymundo DO

## 2024-03-21 NOTE — TELEPHONE ENCOUNTER
Called and spoke with Susi from [facility]'s out patient lab to inform them that the sent our office the lab results that she be going to Dr Otero's office.  Susi will take care of getting it to the correct physician.

## 2024-03-22 LAB
ALBUMIN SERPL-MCNC: 3.5 G/DL (ref 3.5–4.7)
ALPHA1 GLOB SERPL ELPH-MCNC: 0.3 G/DL (ref 0.2–0.4)
ALPHA2 GLOB SERPL ELPH-MCNC: 0.9 G/DL (ref 0.5–1)
B-GLOBULIN SERPL ELPH-MCNC: 1.2 G/DL (ref 0.8–1.3)
FREE KAPPA/LAMBDA RATIO: 2.34 (ref 0.22–1.74)
GAMMA GLOB SERPL ELPH-MCNC: 0.9 G/DL (ref 0.7–1.6)
KAPPA LC FREE SER-MCNC: 21.8 MG/L
LAMBDA LC FREE SERPL-MCNC: 9.3 MG/L (ref 4.2–27.7)
PATHOLOGIST: NORMAL
PROT PATTERN SERPL ELPH-IMP: NORMAL
PROT SERPL-MCNC: 6.8 G/DL (ref 6.4–8.3)
SEND OUT REPORT: NORMAL
TEST NAME: NORMAL

## 2024-03-26 LAB
SEND OUT REPORT: NORMAL
SEND OUT REPORT: NORMAL
TEST NAME: NORMAL
TEST NAME: NORMAL

## 2024-04-03 ENCOUNTER — HOSPITAL ENCOUNTER (OUTPATIENT)
Dept: INFUSION THERAPY | Age: 49
Discharge: HOME | End: 2024-04-03

## 2024-04-03 ENCOUNTER — OFFICE VISIT (OUTPATIENT)
Dept: ONCOLOGY | Age: 49
End: 2024-04-03
Payer: COMMERCIAL

## 2024-04-03 VITALS
WEIGHT: 184.5 LBS | OXYGEN SATURATION: 98 % | DIASTOLIC BLOOD PRESSURE: 91 MMHG | BODY MASS INDEX: 32.69 KG/M2 | HEART RATE: 68 BPM | TEMPERATURE: 97.9 F | HEIGHT: 63 IN | SYSTOLIC BLOOD PRESSURE: 141 MMHG

## 2024-04-03 DIAGNOSIS — E61.1 IRON DEFICIENCY: Primary | ICD-10-CM

## 2024-04-03 DIAGNOSIS — R76.8 ELEVATED SERUM IMMUNOGLOBULIN FREE LIGHT CHAIN LEVEL: ICD-10-CM

## 2024-04-03 PROCEDURE — 1036F TOBACCO NON-USER: CPT | Performed by: STUDENT IN AN ORGANIZED HEALTH CARE EDUCATION/TRAINING PROGRAM

## 2024-04-03 PROCEDURE — 99212 OFFICE O/P EST SF 10 MIN: CPT

## 2024-04-03 PROCEDURE — 3017F COLORECTAL CA SCREEN DOC REV: CPT | Performed by: STUDENT IN AN ORGANIZED HEALTH CARE EDUCATION/TRAINING PROGRAM

## 2024-04-03 PROCEDURE — G8417 CALC BMI ABV UP PARAM F/U: HCPCS | Performed by: STUDENT IN AN ORGANIZED HEALTH CARE EDUCATION/TRAINING PROGRAM

## 2024-04-03 PROCEDURE — 99213 OFFICE O/P EST LOW 20 MIN: CPT | Performed by: STUDENT IN AN ORGANIZED HEALTH CARE EDUCATION/TRAINING PROGRAM

## 2024-04-03 PROCEDURE — G8427 DOCREV CUR MEDS BY ELIG CLIN: HCPCS | Performed by: STUDENT IN AN ORGANIZED HEALTH CARE EDUCATION/TRAINING PROGRAM

## 2024-04-03 RX ORDER — FERROUS SULFATE 325(65) MG
325 TABLET ORAL
Qty: 180 TABLET | Refills: 1 | Status: SHIPPED | OUTPATIENT
Start: 2024-04-03

## 2024-04-03 NOTE — PROGRESS NOTES
Patient provided with discharge instructions.  All questions answered.  Patient understands follow up plan of care.       
understanding.      Rolo Otero  Medical Oncology  [facility]  04/03/24 2:06 PM    [facility] Office  P: [phone]  F: [phone]    [facility]  P: [phone]  F: [phone]     [facility] Office  P: [phone]  F: [phone]

## 2024-06-10 ENCOUNTER — HOSPITAL ENCOUNTER (OUTPATIENT)
Dept: INFUSION THERAPY | Age: 49
Discharge: HOME | End: 2024-06-10
Payer: COMMERCIAL

## 2024-06-10 DIAGNOSIS — E61.1 IRON DEFICIENCY: ICD-10-CM

## 2024-06-10 DIAGNOSIS — R76.8 ELEVATED SERUM IMMUNOGLOBULIN FREE LIGHT CHAIN LEVEL: ICD-10-CM

## 2024-06-10 LAB
BASOPHILS # BLD: 0.04 K/UL (ref 0–0.2)
BASOPHILS NFR BLD: 0 % (ref 0–2)
EOSINOPHIL # BLD: 0.07 K/UL (ref 0.05–0.5)
EOSINOPHILS RELATIVE PERCENT: 1 % (ref 0–6)
ERYTHROCYTE [DISTWIDTH] IN BLOOD BY AUTOMATED COUNT: 14.4 % (ref 11.5–15)
HCT VFR BLD AUTO: 44.6 % (ref 34–48)
HGB BLD-MCNC: 14.5 G/DL (ref 11.5–15.5)
IMM GRANULOCYTES # BLD AUTO: <0.03 K/UL (ref 0–0.58)
IMM GRANULOCYTES NFR BLD: 0 % (ref 0–5)
IRON SATN MFR SERPL: 44 % (ref 15–50)
IRON SERPL-MCNC: 154 UG/DL (ref 37–145)
LYMPHOCYTES NFR BLD: 1.6 K/UL (ref 1.5–4)
LYMPHOCYTES RELATIVE PERCENT: 17 % (ref 20–42)
MCH RBC QN AUTO: 26.9 PG (ref 26–35)
MCHC RBC AUTO-ENTMCNC: 32.5 G/DL (ref 32–34.5)
MCV RBC AUTO: 82.6 FL (ref 80–99.9)
MONOCYTES NFR BLD: 0.36 K/UL (ref 0.1–0.95)
MONOCYTES NFR BLD: 4 % (ref 2–12)
NEUTROPHILS NFR BLD: 78 % (ref 43–80)
NEUTS SEG NFR BLD: 7.23 K/UL (ref 1.8–7.3)
PLATELET # BLD AUTO: 253 K/UL (ref 130–450)
PMV BLD AUTO: 11.1 FL (ref 7–12)
RBC # BLD AUTO: 5.4 M/UL (ref 3.5–5.5)
TIBC SERPL-MCNC: 349 UG/DL (ref 250–450)
WBC OTHER # BLD: 9.3 K/UL (ref 4.5–11.5)

## 2024-06-10 PROCEDURE — 86334 IMMUNOFIX E-PHORESIS SERUM: CPT

## 2024-06-10 PROCEDURE — 83550 IRON BINDING TEST: CPT

## 2024-06-10 PROCEDURE — 84155 ASSAY OF PROTEIN SERUM: CPT

## 2024-06-10 PROCEDURE — 84165 PROTEIN E-PHORESIS SERUM: CPT

## 2024-06-10 PROCEDURE — 36415 COLL VENOUS BLD VENIPUNCTURE: CPT

## 2024-06-10 PROCEDURE — 82728 ASSAY OF FERRITIN: CPT

## 2024-06-10 PROCEDURE — 85025 COMPLETE CBC W/AUTO DIFF WBC: CPT

## 2024-06-10 PROCEDURE — 83521 IG LIGHT CHAINS FREE EACH: CPT

## 2024-06-10 PROCEDURE — 83540 ASSAY OF IRON: CPT

## 2024-06-11 LAB — FERRITIN SERPL-MCNC: 39 NG/ML

## 2024-06-12 ENCOUNTER — OFFICE VISIT (OUTPATIENT)
Dept: ONCOLOGY | Age: 49
End: 2024-06-12
Payer: COMMERCIAL

## 2024-06-12 VITALS
DIASTOLIC BLOOD PRESSURE: 80 MMHG | BODY MASS INDEX: 32.4 KG/M2 | SYSTOLIC BLOOD PRESSURE: 125 MMHG | TEMPERATURE: 97.4 F | WEIGHT: 182.9 LBS | OXYGEN SATURATION: 98 % | HEART RATE: 66 BPM

## 2024-06-12 DIAGNOSIS — R89.8 ABNORMAL BONE MARROW EXAMINATION: ICD-10-CM

## 2024-06-12 DIAGNOSIS — E61.1 IRON DEFICIENCY: Primary | ICD-10-CM

## 2024-06-12 LAB
FREE KAPPA/LAMBDA RATIO: 2.66 (ref 0.22–1.74)
KAPPA LC FREE SER-MCNC: 19.4 MG/L
LAMBDA LC FREE SERPL-MCNC: 7.3 MG/L (ref 4.2–27.7)

## 2024-06-12 PROCEDURE — G8417 CALC BMI ABV UP PARAM F/U: HCPCS | Performed by: STUDENT IN AN ORGANIZED HEALTH CARE EDUCATION/TRAINING PROGRAM

## 2024-06-12 PROCEDURE — 3017F COLORECTAL CA SCREEN DOC REV: CPT | Performed by: STUDENT IN AN ORGANIZED HEALTH CARE EDUCATION/TRAINING PROGRAM

## 2024-06-12 PROCEDURE — G8427 DOCREV CUR MEDS BY ELIG CLIN: HCPCS | Performed by: STUDENT IN AN ORGANIZED HEALTH CARE EDUCATION/TRAINING PROGRAM

## 2024-06-12 PROCEDURE — 99213 OFFICE O/P EST LOW 20 MIN: CPT

## 2024-06-12 PROCEDURE — 99213 OFFICE O/P EST LOW 20 MIN: CPT | Performed by: STUDENT IN AN ORGANIZED HEALTH CARE EDUCATION/TRAINING PROGRAM

## 2024-06-12 PROCEDURE — 1036F TOBACCO NON-USER: CPT | Performed by: STUDENT IN AN ORGANIZED HEALTH CARE EDUCATION/TRAINING PROGRAM

## 2024-06-12 NOTE — PROGRESS NOTES
[facility] PHYSICIANS [place] SPECIALTY [facility] MEDICAL ONCOLOGY  [address] [address]  Dept: [phone]  Loc: [phone]    Clinic Progress Note      Date of Encounter: 06/12/2024     Referring Provider:  Awilda Raymundo DO    Reason for Visit:   Nonspecific bone marrow findings on MRI        PCP:  Fidelina Lam MD    Demographics: 48 y.o. female    Chief Complaint   Patient presents with    Follow-up     Disorder of hematopoietic system         Subjective:  Doing well. Here to discuss results and management. Tolerating PO iron well.     HPI from Initial Outpatient Consultation (03/20/2024):  The patient is a 48 y.o. female who comes in for incident findings of \"nonspecific hematopoietic marrow signal alteration...\" She has chronic right hip pain which she follows PM&R, in which had led to workup with MRI right hip. The patient denies of any fevers, chills, night sweats, adenopathy, bleeding. She is not a smoker.     Her hip pain is mostly on lateral and anterior aspects.         Review of Systems;  CONSTITUTIONAL :  No fever, chills, fatigue, night sweats, or unintended weight loss  EYES: No discharge, itchiness, pain, redness  ENMT: No mouth sores, thrush   RESPIRATORY: No shortness of breath, or cough  CARDIOVASCULAR: No chest pain, palpitations  GASTROINTESTINAL: No nausea, vomiting   GENITOURINARY: No dysuria   ENDOCRINE: No polydipsia, polyuria, cold or heat intolerance.  MUSCULOSKELETAL: Right hip pain (chronic)  INTEGUMENT.: No skin rash or bruises.  HEM/LYMPHATICS: No adenopathy, bruising or prolonged bleeding  NEURO: No headache, dizziness, syncope/presyncope, focal deficits or loss of balance.         Past Medical History:   Diagnosis Date    Anemia     Anxiety     Arthritis     Asthma     Chronic back pain     DDD (degenerative disc disease)     Depression     Fibromyalgia     Fibromyalgia     IBS (irritable bowel syndrome)     Migraines     Pain     PTSD

## 2024-06-13 LAB
ALBUMIN SERPL-MCNC: 3.4 G/DL (ref 3.5–4.7)
ALPHA1 GLOB SERPL ELPH-MCNC: 0.2 G/DL (ref 0.2–0.4)
ALPHA2 GLOB SERPL ELPH-MCNC: 0.9 G/DL (ref 0.5–1)
B-GLOBULIN SERPL ELPH-MCNC: 1.1 G/DL (ref 0.8–1.3)
GAMMA GLOB SERPL ELPH-MCNC: 1.2 G/DL (ref 0.7–1.6)
INTERPRETATION SERPL IFE-IMP: NORMAL
PATH REV: NORMAL
PATHOLOGIST: ABNORMAL
PROT PATTERN SERPL ELPH-IMP: ABNORMAL
PROT SERPL-MCNC: 6.9 G/DL (ref 6.4–8.3)

## 2024-06-14 DIAGNOSIS — R89.8 ABNORMAL BONE MARROW EXAMINATION: ICD-10-CM

## 2024-06-14 DIAGNOSIS — E61.1 IRON DEFICIENCY: Primary | ICD-10-CM

## 2024-07-15 ENCOUNTER — HOSPITAL ENCOUNTER (OUTPATIENT)
Dept: MRI IMAGING | Age: 49
Discharge: HOME | End: 2024-07-17
Payer: COMMERCIAL

## 2024-07-15 DIAGNOSIS — R89.8 ABNORMAL BONE MARROW EXAMINATION: ICD-10-CM

## 2024-07-15 DIAGNOSIS — E61.1 IRON DEFICIENCY: ICD-10-CM

## 2024-07-15 PROCEDURE — A9577 INJ MULTIHANCE: HCPCS | Performed by: RADIOLOGY

## 2024-07-15 PROCEDURE — 73723 MRI JOINT LWR EXTR W/O&W/DYE: CPT

## 2024-07-15 PROCEDURE — 6360000004 HC RX CONTRAST MEDICATION: Performed by: RADIOLOGY

## 2024-07-15 RX ADMIN — GADOBENATE DIMEGLUMINE 17 ML: 529 INJECTION, SOLUTION INTRAVENOUS at 14:17

## 2024-08-15 ENCOUNTER — HOSPITAL ENCOUNTER (OUTPATIENT)
Dept: INFUSION THERAPY | Age: 49
Discharge: HOME | End: 2024-08-15
Payer: COMMERCIAL

## 2024-08-15 DIAGNOSIS — D50.8 OTHER IRON DEFICIENCY ANEMIA: ICD-10-CM

## 2024-08-15 DIAGNOSIS — E61.1 IRON DEFICIENCY: ICD-10-CM

## 2024-08-15 DIAGNOSIS — R76.8 ELEVATED SERUM IMMUNOGLOBULIN FREE LIGHT CHAIN LEVEL: Primary | ICD-10-CM

## 2024-08-15 DIAGNOSIS — E61.1 IRON DEFICIENCY: Primary | ICD-10-CM

## 2024-08-15 LAB
BASOPHILS # BLD: 0.05 K/UL (ref 0–0.2)
BASOPHILS NFR BLD: 1 % (ref 0–2)
EOSINOPHIL # BLD: 0.11 K/UL (ref 0.05–0.5)
EOSINOPHILS RELATIVE PERCENT: 1 % (ref 0–6)
ERYTHROCYTE [DISTWIDTH] IN BLOOD BY AUTOMATED COUNT: 13.4 % (ref 11.5–15)
FERRITIN SERPL-MCNC: 47 NG/ML
HCT VFR BLD AUTO: 44.7 % (ref 34–48)
HGB BLD-MCNC: 15 G/DL (ref 11.5–15.5)
IMM GRANULOCYTES # BLD AUTO: <0.03 K/UL (ref 0–0.58)
IMM GRANULOCYTES NFR BLD: 0 % (ref 0–5)
IRON SATN MFR SERPL: 38 % (ref 15–50)
IRON SERPL-MCNC: 131 UG/DL (ref 37–145)
LYMPHOCYTES NFR BLD: 1.36 K/UL (ref 1.5–4)
LYMPHOCYTES RELATIVE PERCENT: 18 % (ref 20–42)
MCH RBC QN AUTO: 28.4 PG (ref 26–35)
MCHC RBC AUTO-ENTMCNC: 33.6 G/DL (ref 32–34.5)
MCV RBC AUTO: 84.7 FL (ref 80–99.9)
MONOCYTES NFR BLD: 0.32 K/UL (ref 0.1–0.95)
MONOCYTES NFR BLD: 4 % (ref 2–12)
NEUTROPHILS NFR BLD: 76 % (ref 43–80)
NEUTS SEG NFR BLD: 5.76 K/UL (ref 1.8–7.3)
PLATELET # BLD AUTO: 220 K/UL (ref 130–450)
PMV BLD AUTO: 11.3 FL (ref 7–12)
RBC # BLD AUTO: 5.28 M/UL (ref 3.5–5.5)
TIBC SERPL-MCNC: 341 UG/DL (ref 250–450)
WBC OTHER # BLD: 7.6 K/UL (ref 4.5–11.5)

## 2024-08-15 PROCEDURE — 83550 IRON BINDING TEST: CPT

## 2024-08-15 PROCEDURE — 85025 COMPLETE CBC W/AUTO DIFF WBC: CPT

## 2024-08-15 PROCEDURE — 82728 ASSAY OF FERRITIN: CPT

## 2024-08-15 PROCEDURE — 83540 ASSAY OF IRON: CPT

## 2024-08-15 PROCEDURE — 36415 COLL VENOUS BLD VENIPUNCTURE: CPT

## 2024-08-15 PROCEDURE — 86334 IMMUNOFIX E-PHORESIS SERUM: CPT

## 2024-08-15 PROCEDURE — 84155 ASSAY OF PROTEIN SERUM: CPT

## 2024-08-15 PROCEDURE — 84165 PROTEIN E-PHORESIS SERUM: CPT

## 2024-08-15 PROCEDURE — 83521 IG LIGHT CHAINS FREE EACH: CPT

## 2024-08-16 ENCOUNTER — OFFICE VISIT (OUTPATIENT)
Dept: ONCOLOGY | Age: 49
End: 2024-08-16
Payer: COMMERCIAL

## 2024-08-16 VITALS
HEART RATE: 64 BPM | HEIGHT: 63 IN | TEMPERATURE: 97.6 F | DIASTOLIC BLOOD PRESSURE: 83 MMHG | BODY MASS INDEX: 31.73 KG/M2 | WEIGHT: 179.1 LBS | SYSTOLIC BLOOD PRESSURE: 123 MMHG | OXYGEN SATURATION: 98 %

## 2024-08-16 DIAGNOSIS — E61.1 IRON DEFICIENCY: Primary | ICD-10-CM

## 2024-08-16 DIAGNOSIS — R89.8 ABNORMAL BONE MARROW EXAMINATION: ICD-10-CM

## 2024-08-16 DIAGNOSIS — R76.8 ELEVATED SERUM IMMUNOGLOBULIN FREE LIGHT CHAIN LEVEL: ICD-10-CM

## 2024-08-16 LAB
ALBUMIN SERPL-MCNC: 3.4 G/DL (ref 3.5–4.7)
ALPHA1 GLOB SERPL ELPH-MCNC: 0.3 G/DL (ref 0.2–0.4)
ALPHA2 GLOB SERPL ELPH-MCNC: 0.9 G/DL (ref 0.5–1)
B-GLOBULIN SERPL ELPH-MCNC: 1 G/DL (ref 0.8–1.3)
GAMMA GLOB SERPL ELPH-MCNC: 1 G/DL (ref 0.7–1.6)
INTERPRETATION SERPL IFE-IMP: NORMAL
PATH REV: NORMAL
PATHOLOGIST: ABNORMAL
PROT PATTERN SERPL ELPH-IMP: ABNORMAL
PROT SERPL-MCNC: 6.6 G/DL (ref 6.4–8.3)

## 2024-08-16 PROCEDURE — 99213 OFFICE O/P EST LOW 20 MIN: CPT | Performed by: STUDENT IN AN ORGANIZED HEALTH CARE EDUCATION/TRAINING PROGRAM

## 2024-08-16 PROCEDURE — G8417 CALC BMI ABV UP PARAM F/U: HCPCS | Performed by: STUDENT IN AN ORGANIZED HEALTH CARE EDUCATION/TRAINING PROGRAM

## 2024-08-16 PROCEDURE — G8427 DOCREV CUR MEDS BY ELIG CLIN: HCPCS | Performed by: STUDENT IN AN ORGANIZED HEALTH CARE EDUCATION/TRAINING PROGRAM

## 2024-08-16 PROCEDURE — 3017F COLORECTAL CA SCREEN DOC REV: CPT | Performed by: STUDENT IN AN ORGANIZED HEALTH CARE EDUCATION/TRAINING PROGRAM

## 2024-08-16 PROCEDURE — 1036F TOBACCO NON-USER: CPT | Performed by: STUDENT IN AN ORGANIZED HEALTH CARE EDUCATION/TRAINING PROGRAM

## 2024-08-16 NOTE — PROGRESS NOTES
[facility] PHYSICIANS [place] SPECIALTY [facility] MEDICAL ONCOLOGY  [address] [address]  Dept: [phone]  Loc: [phone]    Clinic Progress Note      Date of Encounter: 08/16/2024     Referring Provider:  Awilda Raymundo DO    Reason for Visit:   Nonspecific bone marrow findings on MRI        PCP:  Fidelina Lam MD    Demographics: 49 y.o. female    Chief Complaint   Patient presents with    Follow-up     iron deficiency         Subjective:  Doing well. C/o chronic sciatic pain, but now radiating towards groin.   Denies of any bleeding or new issues otherwise.     HPI from Initial Outpatient Consultation (03/20/2024):  The patient is a 48 y.o. female who comes in for incident findings of \"nonspecific hematopoietic marrow signal alteration...\" She has chronic right hip pain which she follows PM&R, in which had led to workup with MRI right hip. The patient denies of any fevers, chills, night sweats, adenopathy, bleeding. She is not a smoker.     Her hip pain is mostly on lateral and anterior aspects.             Past Medical History:   Diagnosis Date    Anemia     Anxiety     Arthritis     Asthma     Chronic back pain     DDD (degenerative disc disease)     Depression     Fibromyalgia     Fibromyalgia     IBS (irritable bowel syndrome)     Migraines     Pain     PTSD (post-traumatic stress disorder)        Past Surgical History:   Procedure Laterality Date    ABDOMEN SURGERY      ABDOMINAL EXPLORATION SURGERY      APPENDECTOMY      COLONOSCOPY      HYSTERECTOMY (CERVIX STATUS UNKNOWN)      KNEE SURGERY      cut 2 ligaments right    NERVE BLOCK Bilateral 10 29 13    si inj #1    NERVE BLOCK  11/05/13    sacroiliac joint bilateral #2    OTHER SURGICAL HISTORY      epidurals    OTHER SURGICAL HISTORY  1/10/13    plif L4-5, L5-S1 with cages rods and screws    SHOULDER SURGERY      right    SPINE SURGERY  1/10/13    l4-5 & s1    TONSILLECTOMY         Social History     Socioeconomic

## 2024-08-17 LAB
FREE KAPPA/LAMBDA RATIO: 2.3 (ref 0.22–1.74)
KAPPA LC FREE SER-MCNC: 24.1 MG/L
LAMBDA LC FREE SERPL-MCNC: 10.5 MG/L (ref 4.2–27.7)

## 2024-08-20 ENCOUNTER — TELEPHONE (OUTPATIENT)
Dept: PHYSICAL MEDICINE AND REHAB | Age: 49
End: 2024-08-20

## 2024-08-20 NOTE — TELEPHONE ENCOUNTER
----- Message from Dr. Awilda Raymundo DO sent at 8/19/2024 10:57 PM EDT -----  Yes thank you. I have not seen her since February at which time she was to start PT for this and then follow up with me if not improving. I will have my staff reach out to see if she wants to come back in.  Thanks for your help.  ----- Message -----  From: Rolo Otero MD  Sent: 8/18/2024   3:19 PM EDT  To: Awilda Raymundo DO    Hi Dr. Raymundo  I hope you have been well.   For this patient I feel her bone marrow changes may be associated with underlying iron deficiency.   I did a repeat MRI of the hip, which does not seem to have much changes with the bone marrow, so for now, I was going to keep watch.     Also there was mention of a tendon tear around the gluteal area. I let the patient know. Her chronic pain issues a largely unchanged but noted her right sciatic pain now seems to radiate towards her right groin. She didn't seem very alarmed by this otherwise. She is walking fine and was not in severe pain. I just wanted to let you know about her MSK issues, and was wondering if you had insight or advise on that.     Thanks!  vita

## 2024-10-30 LAB

## 2024-12-14 ENCOUNTER — APPOINTMENT (OUTPATIENT)
Dept: CARDIOLOGY | Facility: HOSPITAL | Age: 49
End: 2024-12-14
Payer: COMMERCIAL

## 2024-12-14 ENCOUNTER — APPOINTMENT (OUTPATIENT)
Dept: RADIOLOGY | Facility: HOSPITAL | Age: 49
End: 2024-12-14
Payer: COMMERCIAL

## 2024-12-14 ENCOUNTER — HOSPITAL ENCOUNTER (OUTPATIENT)
Facility: HOSPITAL | Age: 49
Setting detail: OBSERVATION
Discharge: HOME | End: 2024-12-15
Attending: STUDENT IN AN ORGANIZED HEALTH CARE EDUCATION/TRAINING PROGRAM | Admitting: INTERNAL MEDICINE
Payer: COMMERCIAL

## 2024-12-14 DIAGNOSIS — F32.A DEPRESSIVE DISORDER: ICD-10-CM

## 2024-12-14 DIAGNOSIS — R56.9 SEIZURE (MULTI): Primary | ICD-10-CM

## 2024-12-14 PROBLEM — R55 RECURRENT SYNCOPE: Status: ACTIVE | Noted: 2024-12-14

## 2024-12-14 LAB
ALBUMIN SERPL BCP-MCNC: 4.4 G/DL (ref 3.4–5)
ALP SERPL-CCNC: 93 U/L (ref 33–110)
ALT SERPL W P-5'-P-CCNC: 16 U/L (ref 7–45)
ANION GAP SERPL CALC-SCNC: 24 MMOL/L (ref 10–20)
APPEARANCE UR: CLEAR
AST SERPL W P-5'-P-CCNC: 18 U/L (ref 9–39)
BASOPHILS # BLD AUTO: 0.06 X10*3/UL (ref 0–0.1)
BASOPHILS NFR BLD AUTO: 0.7 %
BILIRUB SERPL-MCNC: 0.5 MG/DL (ref 0–1.2)
BILIRUB UR STRIP.AUTO-MCNC: NEGATIVE MG/DL
BUN SERPL-MCNC: 9 MG/DL (ref 6–23)
CALCIUM SERPL-MCNC: 9 MG/DL (ref 8.6–10.3)
CARDIAC TROPONIN I PNL SERPL HS: 4 NG/L (ref 0–13)
CARDIAC TROPONIN I PNL SERPL HS: 7 NG/L (ref 0–13)
CHLORIDE SERPL-SCNC: 99 MMOL/L (ref 98–107)
CK SERPL-CCNC: 142 U/L (ref 0–215)
CO2 SERPL-SCNC: 22 MMOL/L (ref 21–32)
COLOR UR: COLORLESS
CREAT SERPL-MCNC: 1.24 MG/DL (ref 0.5–1.05)
EGFRCR SERPLBLD CKD-EPI 2021: 53 ML/MIN/1.73M*2
EOSINOPHIL # BLD AUTO: 0.15 X10*3/UL (ref 0–0.7)
EOSINOPHIL NFR BLD AUTO: 1.8 %
ERYTHROCYTE [DISTWIDTH] IN BLOOD BY AUTOMATED COUNT: 12.2 % (ref 11.5–14.5)
FLUAV RNA RESP QL NAA+PROBE: NOT DETECTED
FLUBV RNA RESP QL NAA+PROBE: NOT DETECTED
GLUCOSE SERPL-MCNC: 94 MG/DL (ref 74–99)
GLUCOSE UR STRIP.AUTO-MCNC: NORMAL MG/DL
HCT VFR BLD AUTO: 44.1 % (ref 36–46)
HGB BLD-MCNC: 14.7 G/DL (ref 12–16)
IMM GRANULOCYTES # BLD AUTO: 0.03 X10*3/UL (ref 0–0.7)
IMM GRANULOCYTES NFR BLD AUTO: 0.4 % (ref 0–0.9)
KETONES UR STRIP.AUTO-MCNC: NEGATIVE MG/DL
LACTATE SERPL-SCNC: 1.8 MMOL/L (ref 0.4–2)
LACTATE SERPL-SCNC: 9.8 MMOL/L (ref 0.4–2)
LEUKOCYTE ESTERASE UR QL STRIP.AUTO: NEGATIVE
LYMPHOCYTES # BLD AUTO: 2.69 X10*3/UL (ref 1.2–4.8)
LYMPHOCYTES NFR BLD AUTO: 32.2 %
MAGNESIUM SERPL-MCNC: 1.7 MG/DL (ref 1.6–2.4)
MCH RBC QN AUTO: 28.8 PG (ref 26–34)
MCHC RBC AUTO-ENTMCNC: 33.3 G/DL (ref 32–36)
MCV RBC AUTO: 87 FL (ref 80–100)
MONOCYTES # BLD AUTO: 0.38 X10*3/UL (ref 0.1–1)
MONOCYTES NFR BLD AUTO: 4.5 %
NEUTROPHILS # BLD AUTO: 5.05 X10*3/UL (ref 1.2–7.7)
NEUTROPHILS NFR BLD AUTO: 60.4 %
NITRITE UR QL STRIP.AUTO: NEGATIVE
NRBC BLD-RTO: 0 /100 WBCS (ref 0–0)
PH UR STRIP.AUTO: 5.5 [PH]
PLATELET # BLD AUTO: 240 X10*3/UL (ref 150–450)
POTASSIUM SERPL-SCNC: 3 MMOL/L (ref 3.5–5.3)
PROT SERPL-MCNC: 7.1 G/DL (ref 6.4–8.2)
PROT UR STRIP.AUTO-MCNC: NEGATIVE MG/DL
RBC # BLD AUTO: 5.1 X10*6/UL (ref 4–5.2)
RBC # UR STRIP.AUTO: NEGATIVE /UL
SARS-COV-2 RNA RESP QL NAA+PROBE: NOT DETECTED
SODIUM SERPL-SCNC: 142 MMOL/L (ref 136–145)
SP GR UR STRIP.AUTO: 1.01
UROBILINOGEN UR STRIP.AUTO-MCNC: NORMAL MG/DL
WBC # BLD AUTO: 8.4 X10*3/UL (ref 4.4–11.3)

## 2024-12-14 PROCEDURE — 70450 CT HEAD/BRAIN W/O DYE: CPT | Performed by: RADIOLOGY

## 2024-12-14 PROCEDURE — 99223 1ST HOSP IP/OBS HIGH 75: CPT

## 2024-12-14 PROCEDURE — G0378 HOSPITAL OBSERVATION PER HR: HCPCS

## 2024-12-14 PROCEDURE — 2500000002 HC RX 250 W HCPCS SELF ADMINISTERED DRUGS (ALT 637 FOR MEDICARE OP, ALT 636 FOR OP/ED): Performed by: PHYSICIAN ASSISTANT

## 2024-12-14 PROCEDURE — 36415 COLL VENOUS BLD VENIPUNCTURE: CPT | Performed by: PHYSICIAN ASSISTANT

## 2024-12-14 PROCEDURE — 2500000001 HC RX 250 WO HCPCS SELF ADMINISTERED DRUGS (ALT 637 FOR MEDICARE OP)

## 2024-12-14 PROCEDURE — 80053 COMPREHEN METABOLIC PANEL: CPT | Performed by: PHYSICIAN ASSISTANT

## 2024-12-14 PROCEDURE — 83735 ASSAY OF MAGNESIUM: CPT | Performed by: PHYSICIAN ASSISTANT

## 2024-12-14 PROCEDURE — 82550 ASSAY OF CK (CPK): CPT | Performed by: PHYSICIAN ASSISTANT

## 2024-12-14 PROCEDURE — 2500000002 HC RX 250 W HCPCS SELF ADMINISTERED DRUGS (ALT 637 FOR MEDICARE OP, ALT 636 FOR OP/ED)

## 2024-12-14 PROCEDURE — 83605 ASSAY OF LACTIC ACID: CPT | Performed by: PHYSICIAN ASSISTANT

## 2024-12-14 PROCEDURE — 71045 X-RAY EXAM CHEST 1 VIEW: CPT

## 2024-12-14 PROCEDURE — 70450 CT HEAD/BRAIN W/O DYE: CPT

## 2024-12-14 PROCEDURE — 2500000001 HC RX 250 WO HCPCS SELF ADMINISTERED DRUGS (ALT 637 FOR MEDICARE OP): Performed by: PHYSICIAN ASSISTANT

## 2024-12-14 PROCEDURE — 71045 X-RAY EXAM CHEST 1 VIEW: CPT | Performed by: RADIOLOGY

## 2024-12-14 PROCEDURE — 94760 N-INVAS EAR/PLS OXIMETRY 1: CPT

## 2024-12-14 PROCEDURE — 85025 COMPLETE CBC W/AUTO DIFF WBC: CPT | Performed by: PHYSICIAN ASSISTANT

## 2024-12-14 PROCEDURE — 87636 SARSCOV2 & INF A&B AMP PRB: CPT | Performed by: PHYSICIAN ASSISTANT

## 2024-12-14 PROCEDURE — 84484 ASSAY OF TROPONIN QUANT: CPT | Performed by: PHYSICIAN ASSISTANT

## 2024-12-14 PROCEDURE — 81003 URINALYSIS AUTO W/O SCOPE: CPT | Performed by: PHYSICIAN ASSISTANT

## 2024-12-14 PROCEDURE — 93005 ELECTROCARDIOGRAM TRACING: CPT

## 2024-12-14 PROCEDURE — 99285 EMERGENCY DEPT VISIT HI MDM: CPT | Mod: 25 | Performed by: STUDENT IN AN ORGANIZED HEALTH CARE EDUCATION/TRAINING PROGRAM

## 2024-12-14 RX ORDER — BUSPIRONE HYDROCHLORIDE 7.5 MG/1
1 TABLET ORAL
COMMUNITY
Start: 2024-11-27

## 2024-12-14 RX ORDER — POTASSIUM CHLORIDE 20 MEQ/1
40 TABLET, EXTENDED RELEASE ORAL ONCE
Status: COMPLETED | OUTPATIENT
Start: 2024-12-14 | End: 2024-12-14

## 2024-12-14 RX ORDER — FERROUS SULFATE 325(65) MG
1 TABLET ORAL
COMMUNITY

## 2024-12-14 RX ORDER — ENOXAPARIN SODIUM 100 MG/ML
40 INJECTION SUBCUTANEOUS EVERY 24 HOURS
Status: DISCONTINUED | OUTPATIENT
Start: 2024-12-14 | End: 2024-12-15 | Stop reason: HOSPADM

## 2024-12-14 RX ORDER — TIZANIDINE 4 MG/1
4 TABLET ORAL EVERY 8 HOURS PRN
COMMUNITY

## 2024-12-14 RX ORDER — ARIPIPRAZOLE 10 MG/1
1 TABLET ORAL NIGHTLY
Status: ON HOLD | COMMUNITY
Start: 2023-12-04 | End: 2024-12-15 | Stop reason: ENTERED-IN-ERROR

## 2024-12-14 RX ORDER — PREGABALIN 75 MG/1
150 CAPSULE ORAL 3 TIMES DAILY
Status: DISCONTINUED | OUTPATIENT
Start: 2024-12-14 | End: 2024-12-15 | Stop reason: HOSPADM

## 2024-12-14 RX ORDER — HYDROXYZINE PAMOATE 25 MG/1
50 CAPSULE ORAL 4 TIMES DAILY PRN
Status: DISCONTINUED | OUTPATIENT
Start: 2024-12-14 | End: 2024-12-15 | Stop reason: HOSPADM

## 2024-12-14 RX ORDER — HYDROXYZINE PAMOATE 25 MG/1
50 CAPSULE ORAL 4 TIMES DAILY PRN
COMMUNITY
Start: 2024-01-24

## 2024-12-14 RX ORDER — FERROUS SULFATE 325(65) MG
1 TABLET ORAL
Status: DISCONTINUED | OUTPATIENT
Start: 2024-12-15 | End: 2024-12-15 | Stop reason: HOSPADM

## 2024-12-14 RX ORDER — LEVETIRACETAM 500 MG/1
500 TABLET ORAL 2 TIMES DAILY
Status: DISCONTINUED | OUTPATIENT
Start: 2024-12-15 | End: 2024-12-15 | Stop reason: HOSPADM

## 2024-12-14 RX ORDER — BUSPIRONE HYDROCHLORIDE 15 MG/1
7.5 TABLET ORAL
Status: DISCONTINUED | OUTPATIENT
Start: 2024-12-14 | End: 2024-12-15 | Stop reason: HOSPADM

## 2024-12-14 RX ORDER — TIZANIDINE 4 MG/1
4 TABLET ORAL EVERY 8 HOURS PRN
Status: DISCONTINUED | OUTPATIENT
Start: 2024-12-14 | End: 2024-12-15 | Stop reason: HOSPADM

## 2024-12-14 RX ORDER — DESVENLAFAXINE 50 MG/1
50 TABLET, FILM COATED, EXTENDED RELEASE ORAL DAILY
COMMUNITY
Start: 2024-02-21

## 2024-12-14 RX ORDER — ARIPIPRAZOLE 5 MG/1
10 TABLET ORAL NIGHTLY
Status: DISCONTINUED | OUTPATIENT
Start: 2024-12-15 | End: 2024-12-15 | Stop reason: HOSPADM

## 2024-12-14 RX ORDER — DESVENLAFAXINE SUCCINATE 25 MG/1
50 TABLET, EXTENDED RELEASE ORAL DAILY
Status: DISCONTINUED | OUTPATIENT
Start: 2024-12-15 | End: 2024-12-15 | Stop reason: HOSPADM

## 2024-12-14 RX ADMIN — TIZANIDINE 4 MG: 4 TABLET ORAL at 21:25

## 2024-12-14 RX ADMIN — POTASSIUM CHLORIDE 40 MEQ: 1500 TABLET, EXTENDED RELEASE ORAL at 18:05

## 2024-12-14 RX ADMIN — LEVETIRACETAM 1625 MG: 500 TABLET, FILM COATED ORAL at 18:49

## 2024-12-14 RX ADMIN — BUSPIRONE HYDROCHLORIDE 7.5 MG: 15 TABLET ORAL at 21:25

## 2024-12-14 RX ADMIN — PREGABALIN 150 MG: 75 CAPSULE ORAL at 21:25

## 2024-12-14 SDOH — SOCIAL STABILITY: SOCIAL INSECURITY: ARE THERE ANY APPARENT SIGNS OF INJURIES/BEHAVIORS THAT COULD BE RELATED TO ABUSE/NEGLECT?: NO

## 2024-12-14 SDOH — SOCIAL STABILITY: SOCIAL INSECURITY
WITHIN THE LAST YEAR, HAVE YOU BEEN KICKED, HIT, SLAPPED, OR OTHERWISE PHYSICALLY HURT BY YOUR PARTNER OR EX-PARTNER?: NO

## 2024-12-14 SDOH — ECONOMIC STABILITY: INCOME INSECURITY: IN THE PAST 12 MONTHS HAS THE ELECTRIC, GAS, OIL, OR WATER COMPANY THREATENED TO SHUT OFF SERVICES IN YOUR HOME?: NO

## 2024-12-14 SDOH — ECONOMIC STABILITY: TRANSPORTATION INSECURITY: IN THE PAST 12 MONTHS, HAS LACK OF TRANSPORTATION KEPT YOU FROM MEDICAL APPOINTMENTS OR FROM GETTING MEDICATIONS?: NO

## 2024-12-14 SDOH — HEALTH STABILITY: PHYSICAL HEALTH: ON AVERAGE, HOW MANY MINUTES DO YOU ENGAGE IN EXERCISE AT THIS LEVEL?: PATIENT DECLINED

## 2024-12-14 SDOH — ECONOMIC STABILITY: HOUSING INSECURITY: IN THE LAST 12 MONTHS, WAS THERE A TIME WHEN YOU WERE NOT ABLE TO PAY THE MORTGAGE OR RENT ON TIME?: NO

## 2024-12-14 SDOH — SOCIAL STABILITY: SOCIAL INSECURITY: DO YOU FEEL ANYONE HAS EXPLOITED OR TAKEN ADVANTAGE OF YOU FINANCIALLY OR OF YOUR PERSONAL PROPERTY?: NO

## 2024-12-14 SDOH — ECONOMIC STABILITY: HOUSING INSECURITY: AT ANY TIME IN THE PAST 12 MONTHS, WERE YOU HOMELESS OR LIVING IN A SHELTER (INCLUDING NOW)?: NO

## 2024-12-14 SDOH — SOCIAL STABILITY: SOCIAL INSECURITY: HAVE YOU HAD THOUGHTS OF HARMING ANYONE ELSE?: NO

## 2024-12-14 SDOH — SOCIAL STABILITY: SOCIAL INSECURITY: DO YOU FEEL UNSAFE GOING BACK TO THE PLACE WHERE YOU ARE LIVING?: NO

## 2024-12-14 SDOH — SOCIAL STABILITY: SOCIAL INSECURITY: ARE YOU OR HAVE YOU BEEN THREATENED OR ABUSED PHYSICALLY, EMOTIONALLY, OR SEXUALLY BY ANYONE?: NO

## 2024-12-14 SDOH — SOCIAL STABILITY: SOCIAL INSECURITY: WITHIN THE LAST YEAR, HAVE YOU BEEN HUMILIATED OR EMOTIONALLY ABUSED IN OTHER WAYS BY YOUR PARTNER OR EX-PARTNER?: NO

## 2024-12-14 SDOH — SOCIAL STABILITY: SOCIAL INSECURITY: WITHIN THE LAST YEAR, HAVE YOU BEEN AFRAID OF YOUR PARTNER OR EX-PARTNER?: NO

## 2024-12-14 SDOH — SOCIAL STABILITY: SOCIAL INSECURITY: DOES ANYONE TRY TO KEEP YOU FROM HAVING/CONTACTING OTHER FRIENDS OR DOING THINGS OUTSIDE YOUR HOME?: NO

## 2024-12-14 SDOH — SOCIAL STABILITY: SOCIAL INSECURITY: HAS ANYONE EVER THREATENED TO HURT YOUR FAMILY OR YOUR PETS?: NO

## 2024-12-14 SDOH — ECONOMIC STABILITY: FOOD INSECURITY: WITHIN THE PAST 12 MONTHS, YOU WORRIED THAT YOUR FOOD WOULD RUN OUT BEFORE YOU GOT THE MONEY TO BUY MORE.: NEVER TRUE

## 2024-12-14 SDOH — SOCIAL STABILITY: SOCIAL INSECURITY: WERE YOU ABLE TO COMPLETE ALL THE BEHAVIORAL HEALTH SCREENINGS?: YES

## 2024-12-14 SDOH — ECONOMIC STABILITY: FOOD INSECURITY: WITHIN THE PAST 12 MONTHS, THE FOOD YOU BOUGHT JUST DIDN'T LAST AND YOU DIDN'T HAVE MONEY TO GET MORE.: NEVER TRUE

## 2024-12-14 SDOH — SOCIAL STABILITY: SOCIAL INSECURITY: HAVE YOU HAD ANY THOUGHTS OF HARMING ANYONE ELSE?: NO

## 2024-12-14 SDOH — ECONOMIC STABILITY: FOOD INSECURITY: HOW HARD IS IT FOR YOU TO PAY FOR THE VERY BASICS LIKE FOOD, HOUSING, MEDICAL CARE, AND HEATING?: NOT HARD AT ALL

## 2024-12-14 SDOH — SOCIAL STABILITY: SOCIAL INSECURITY
WITHIN THE LAST YEAR, HAVE YOU BEEN RAPED OR FORCED TO HAVE ANY KIND OF SEXUAL ACTIVITY BY YOUR PARTNER OR EX-PARTNER?: NO

## 2024-12-14 SDOH — ECONOMIC STABILITY: HOUSING INSECURITY: IN THE PAST 12 MONTHS, HOW MANY TIMES HAVE YOU MOVED WHERE YOU WERE LIVING?: 1

## 2024-12-14 SDOH — HEALTH STABILITY: PHYSICAL HEALTH
ON AVERAGE, HOW MANY DAYS PER WEEK DO YOU ENGAGE IN MODERATE TO STRENUOUS EXERCISE (LIKE A BRISK WALK)?: PATIENT DECLINED

## 2024-12-14 SDOH — SOCIAL STABILITY: SOCIAL INSECURITY: ABUSE: ADULT

## 2024-12-14 ASSESSMENT — LIFESTYLE VARIABLES
HOW MANY STANDARD DRINKS CONTAINING ALCOHOL DO YOU HAVE ON A TYPICAL DAY: PATIENT DOES NOT DRINK
HAVE YOU EVER FELT YOU SHOULD CUT DOWN ON YOUR DRINKING: NO
EVER FELT BAD OR GUILTY ABOUT YOUR DRINKING: NO
HAVE PEOPLE ANNOYED YOU BY CRITICIZING YOUR DRINKING: NO
AUDIT-C TOTAL SCORE: 1
HOW OFTEN DO YOU HAVE 6 OR MORE DRINKS ON ONE OCCASION: NEVER
EVER HAD A DRINK FIRST THING IN THE MORNING TO STEADY YOUR NERVES TO GET RID OF A HANGOVER: NO
AUDIT-C TOTAL SCORE: 1
SKIP TO QUESTIONS 9-10: 1
TOTAL SCORE: 0
HOW OFTEN DO YOU HAVE A DRINK CONTAINING ALCOHOL: MONTHLY OR LESS

## 2024-12-14 ASSESSMENT — ACTIVITIES OF DAILY LIVING (ADL)
LACK_OF_TRANSPORTATION: NO
DRESSING YOURSELF: INDEPENDENT
GROOMING: INDEPENDENT
JUDGMENT_ADEQUATE_SAFELY_COMPLETE_DAILY_ACTIVITIES: YES
HEARING - LEFT EAR: FUNCTIONAL
TOILETING: INDEPENDENT
ADEQUATE_TO_COMPLETE_ADL: YES
BATHING: INDEPENDENT
ASSISTIVE_DEVICE: EYEGLASSES
WALKS IN HOME: INDEPENDENT
HEARING - RIGHT EAR: FUNCTIONAL
LACK_OF_TRANSPORTATION: NO
PATIENT'S MEMORY ADEQUATE TO SAFELY COMPLETE DAILY ACTIVITIES?: YES
FEEDING YOURSELF: INDEPENDENT

## 2024-12-14 ASSESSMENT — PAIN - FUNCTIONAL ASSESSMENT
PAIN_FUNCTIONAL_ASSESSMENT: 0-10
PAIN_FUNCTIONAL_ASSESSMENT: 0-10

## 2024-12-14 ASSESSMENT — ENCOUNTER SYMPTOMS
CHILLS: 0
WEAKNESS: 1
TREMORS: 1
FEVER: 0

## 2024-12-14 ASSESSMENT — PAIN SCALES - GENERAL
PAINLEVEL_OUTOF10: 0 - NO PAIN
PAINLEVEL_OUTOF10: 0 - NO PAIN

## 2024-12-14 ASSESSMENT — COGNITIVE AND FUNCTIONAL STATUS - GENERAL
DAILY ACTIVITIY SCORE: 24
MOBILITY SCORE: 24
PATIENT BASELINE BEDBOUND: NO

## 2024-12-14 ASSESSMENT — COLUMBIA-SUICIDE SEVERITY RATING SCALE - C-SSRS
6. HAVE YOU EVER DONE ANYTHING, STARTED TO DO ANYTHING, OR PREPARED TO DO ANYTHING TO END YOUR LIFE?: NO
1. IN THE PAST MONTH, HAVE YOU WISHED YOU WERE DEAD OR WISHED YOU COULD GO TO SLEEP AND NOT WAKE UP?: NO
2. HAVE YOU ACTUALLY HAD ANY THOUGHTS OF KILLING YOURSELF?: NO

## 2024-12-14 ASSESSMENT — PATIENT HEALTH QUESTIONNAIRE - PHQ9
1. LITTLE INTEREST OR PLEASURE IN DOING THINGS: NOT AT ALL
2. FEELING DOWN, DEPRESSED OR HOPELESS: NOT AT ALL
SUM OF ALL RESPONSES TO PHQ9 QUESTIONS 1 & 2: 0

## 2024-12-14 NOTE — ED PROVIDER NOTES
"HPI   Chief Complaint   Patient presents with    Syncope       Is a 49-year-old female coming in for syncope or seizure.  She states that she was lightheaded and dizzy while she was helping her son move when she went to go walk to his truck and started feeling herself fall down to the ground.  She heard her son yell to somebody to call 911.  She does not recall anything until she woke up in the back of the ambulance on the way here.  She is now alert and oriented at baseline x 3.  She states that she feels \"flushed\" but denies any complaints such as weakness, fatigue, pain or discomfort.  She did not have any incontinence.  She denies any areas of injury.  Patient does have a history of fibromyalgia as well as depression and states that she is on antidepressant medications and anti-inflammatory meds.  No new medications.  She has not had any medication changes.  She has been eating and drinking as per usual.  She does report being hydrated today.  She has not had vomiting or diarrhea.  Patient denies any chest pain or shortness of breath currently.  She denies any lightheadedness or dizziness as well now.      History provided by:  Patient and EMS personnel          Patient History   History reviewed. No pertinent past medical history.  History reviewed. No pertinent surgical history.  No family history on file.  Social History     Tobacco Use    Smoking status: Never    Smokeless tobacco: Never   Substance Use Topics    Alcohol use: Never    Drug use: Never       Physical Exam   ED Triage Vitals [12/14/24 1716]   Temperature Heart Rate Respirations BP   37.1 °C (98.8 °F) 69 20 139/78      Pulse Ox Temp Source Heart Rate Source Patient Position   95 % Temporal Monitor Sitting      BP Location FiO2 (%)     -- --       Physical Exam  Vitals and nursing note reviewed.   Constitutional:       General: She is not in acute distress.     Appearance: Normal appearance. She is not toxic-appearing.   HENT:      Head: " Normocephalic and atraumatic.      Nose: Nose normal.      Mouth/Throat:      Mouth: Mucous membranes are moist.      Pharynx: Oropharynx is clear.   Eyes:      Extraocular Movements: Extraocular movements intact.      Conjunctiva/sclera: Conjunctivae normal.      Pupils: Pupils are equal, round, and reactive to light.   Cardiovascular:      Rate and Rhythm: Normal rate and regular rhythm.      Pulses: Normal pulses.      Heart sounds: Normal heart sounds.   Pulmonary:      Effort: Pulmonary effort is normal. No respiratory distress.      Breath sounds: Normal breath sounds.   Abdominal:      General: Abdomen is flat. Bowel sounds are normal.      Palpations: Abdomen is soft.      Tenderness: There is no abdominal tenderness.   Musculoskeletal:         General: Normal range of motion.      Cervical back: Normal range of motion and neck supple.   Skin:     General: Skin is warm and dry.      Coloration: Skin is not jaundiced or pale.      Findings: No bruising.   Neurological:      General: No focal deficit present.      Mental Status: She is alert and oriented to person, place, and time. Mental status is at baseline.   Psychiatric:         Mood and Affect: Mood normal.         Behavior: Behavior normal.           ED Course & MDM   ED Course as of 12/14/24 1853   Sat Dec 14, 2024   1818 EKG completed at 1712 shows on my interpretation normal sinus rhythm with a ventricular rate of 71 bpm.  No signs of STEMI.  Normal intervals. [RS]      ED Course User Index  [RS] Jae Manjarrez PA-C         Diagnoses as of 12/14/24 1853   Seizure (Multi)                 No data recorded     Jane Coma Scale Score: 15 (12/14/24 1718 : Caren Torres RN)                           Medical Decision Making  Summary:  Medical Decision Making:   Patient presented as described in HPI. Patient case including ROS, PE, and treatment and plan discussed with ED attending if attached as cosigner. Results from labs and or imaging included  below if completed. Jabier Lara  is a 49 y.o. coming in for Patient presents with:  Syncope  .  Due to the patient's symptoms workup was completed to include CT head, chest x-ray, labs.  Lab work does show elevated lactate of 9.8.  She does not have any infectious signs and more likely to be seizure related.  Potassium is low at 3.0.  Again supporting seizure.  She is given oral potassium.  She is given oral hydration 500 cc.  CT of the head is negative.  Chest x-ray shows possible infiltrate versus atelectasis or effusion.  Patient's white blood cell count is normal.  She does not have any cough or signs of pneumonia.  Spoke with Dr. Trivedi who agrees with treatment on the patient as well as starting patient on Keppra and will consult.  Hospital accepted the patient.  Added on CK.      Shared decision making was completed for required hospital stay. I also explained the plan and treatment course. Patient/guardian is in agreement with plan, treatment course, and state that they will comply.    Labs Reviewed  COMPREHENSIVE METABOLIC PANEL - Abnormal     Glucose                       94                     Sodium                        142                    Potassium                     3.0 (*)                Chloride                      99                     Bicarbonate                   22                     Anion Gap                     24 (*)                 Urea Nitrogen                 9                      Creatinine                    1.24 (*)               eGFR                          53 (*)                 Calcium                       9.0                    Albumin                       4.4                    Alkaline Phosphatase          93                     Total Protein                 7.1                    AST                           18                     Bilirubin, Total              0.5                    ALT                           16                  LACTATE - Abnormal     Lactate                        9.8 (*)                  Narrative: Venipuncture immediately after or during the administration of Metamizole may lead to falsely low results. Testing should be performed immediately prior to Metamizole dosing.  URINALYSIS WITH REFLEX CULTURE AND MICROSCOPIC - Abnormal     Color, Urine                  Colorless (*)               Appearance, Urine             Clear                  Specific Gravity, Urine       1.007                  pH, Urine                     5.5                    Protein, Urine                NEGATIVE                Glucose, Urine                Normal                 Blood, Urine                  NEGATIVE                Ketones, Urine                NEGATIVE                Bilirubin, Urine              NEGATIVE                Urobilinogen, Urine           Normal                 Nitrite, Urine                NEGATIVE                Leukocyte Esterase, Urine     NEGATIVE             MAGNESIUM - Normal     Magnesium                     1.70                SARS-COV-2 PCR - Normal     Coronavirus 2019, PCR                                  Narrative: This assay has received FDA Emergency Use Authorization (EUA) and is only authorized for the duration of time that circumstances exist to justify the authorization of the emergency use of in vitro diagnostic tests for the detection of SARS-CoV-2 virus and/or diagnosis of COVID-19 infection under section 564(b)(1) of the Act, 21 U.S.C. 360bbb-3(b)(1). This assay is an in vitro diagnostic nucleic acid amplification test for the qualitative detection of SARS-CoV-2 from nasopharyngeal specimens and has been validated for use at [facility]. Negative results do not preclude COVID-19 infections and should not be used as the sole basis for diagnosis, treatment, or other management decisions.                  INFLUENZA A AND B PCR - Normal     Flu A Result                                         Flu B Result                                            Narrative: This assay is an in vitro diagnostic multiplex nucleic acid amplification test for the detection and discrimination of Influenza A & B from nasopharyngeal specimens, and has been validated for use at [facility]. Negative results do not preclude Influenza A/B infections, and should not be used as the sole basis for diagnosis, treatment, or other management decisions. If Influenza A/B and RSV PCR results are negative, testing for Parainfluenza virus, Adenovirus and Metapneumovirus is routinely performed for [facility] pediatric oncology and intensive care inpatients, and is available on other patients by placing an add-on request.  SERIAL TROPONIN-INITIAL - Normal     Troponin I, High Sensitivity   4                        Narrative: Less than 99th percentile of normal range cutoff-                Female and children under 18 years old <14 ng/L; Male <21 ng/L: Negative                Repeat testing should be performed if clinically indicated.                                 Female and children under 18 years old 14-50 ng/L; Male 21-50 ng/L:                Consistent with possible cardiac damage and possible increased clinical                 risk. Serial measurements may help to assess extent of myocardial damage.                                 >50 ng/L: Consistent with cardiac damage, increased clinical risk and                myocardial infarction. Serial measurements may help assess extent of                 myocardial damage.                                  NOTE: Children less than 1 year old may have higher baseline troponin                 levels and results should be interpreted in conjunction with the overall                 clinical context.                                 NOTE: Troponin I testing is performed using a different                 testing methodology at [facility] than at other                 system hospitals. Direct  result comparisons should only                 be made within the same method.  CBC WITH AUTO DIFFERENTIAL     WBC                           8.4                    nRBC                          0.0                    RBC                           5.10                   Hemoglobin                    14.7                   Hematocrit                    44.1                   MCV                           87                     MCH                           28.8                   MCHC                          33.3                   RDW                           12.2                   Platelets                     240                    Neutrophils %                 60.4                   Immature Granulocytes %, Automated   0.4                    Lymphocytes %                 32.2                   Monocytes %                   4.5                    Eosinophils %                 1.8                    Basophils %                   0.7                    Neutrophils Absolute          5.05                   Immature Granulocytes Absolute, Au*   0.03                   Lymphocytes Absolute          2.69                   Monocytes Absolute            0.38                   Eosinophils Absolute          0.15                   Basophils Absolute            0.06                URINALYSIS WITH REFLEX CULTURE AND MICROSCOPIC       Narrative: The following orders were created for panel order Urinalysis with Reflex Culture and Microscopic.                Procedure                               Abnormality         Status                                   ---------                               -----------         ------                                   Urinalysis with Reflex C...[[id number]]  Abnormal            Final result                             Extra Urine Gray Tube[[id number]]                            In process                                               Please view results for these tests on the individual  orders.  TROPONIN SERIES- (INITIAL, 1 HR)       Narrative: The following orders were created for panel order Troponin I Series, High Sensitivity (0, 1 HR).                Procedure                               Abnormality         Status                                   ---------                               -----------         ------                                   Troponin I, High Sensiti...[[id number]]  Normal              Final result                             Troponin, High Sensitivi...[[id number]]                      In process                                               Please view results for these tests on the individual orders.  EXTRA URINE GRAY TUBE  SERIAL TROPONIN, 1 HOUR  LACTATE  CREATINE KINASE   XR chest 1 view   Final Result    1.  Left basilar opacity which could represent atelectasis, small    effusion or infection.                      MACRO:    None          Signed by: Ashley Block 12/14/2024 6:33 PM    Dictation workstation:   [id number]     CT head wo IV contrast   Final Result    No acute intracranial hemorrhage or mass effect.                MACRO:    None.          Signed by: Ashley Block 12/14/2024 6:32 PM    Dictation workstation:   [id number]                            Tests/Medications/Escalations of Care considered but not given: As in [facility]    Patient care discussed with: N/A  Social Determinants affecting care: N/A    Final diagnosis and disposition as documented     ED Course as of 12/14/24 1840  ------------------------------------------------------------  Time: 12/14 1818  Comment: EKG completed at 1712 shows on my interpretation normal sinus rhythm with a ventricular rate of 71 bpm.  No signs of STEMI.  Normal intervals.  By: Jae Manjarrez PA-C       Shared decision making was completed and determined that patient will be hospitalized. I discussed the differential; results and admit plan with the patient and/or family/friend/caregiver if present.  I emphasized the  importance of hospitalization need for re-evaluation/continued monitoring/interventions. They agreed that if they feel their condition is worsening or if they have any other concern they should alert staff immediately for further assistance. I gave the patient an opportunity to ask all questions they had and answered all of them accordingly. The patient and/or family/friend/caregiver expressed understanding verbally and that they would comply.    Disposition:  Hospitalize to medical floor under Dr. Ojeda per their orders. Discussed findings and treatment done here in ED with admitting physician. It would be a risk to discharge the patient in their condition due to possibility of deterioration in their condition and the need for urgent interventions.    This note has been transcribed using voice recognition and may contain grammatical errors, misplaced words, incorrect words, incorrect phrases or other errors.         Procedure  Procedures     Jae Manjarrez PA-C  12/14/24 3037

## 2024-12-14 NOTE — ED TRIAGE NOTES
Pt presented to the ED with c/o syncope. Pt states she was helping her son move when she felt weak and dizzy. Pt was assisted to the ground and had seizure like activity for < 1 min per EMS. Un able to describe activity. Pt was A&Ox1 when EMS arrived but is now A&Ox4 in ED. Pt complains of no pain at this time.

## 2024-12-15 ENCOUNTER — APPOINTMENT (OUTPATIENT)
Dept: RADIOLOGY | Facility: HOSPITAL | Age: 49
End: 2024-12-15
Payer: COMMERCIAL

## 2024-12-15 VITALS
BODY MASS INDEX: 31.89 KG/M2 | SYSTOLIC BLOOD PRESSURE: 121 MMHG | OXYGEN SATURATION: 95 % | WEIGHT: 180 LBS | HEART RATE: 57 BPM | TEMPERATURE: 97.3 F | RESPIRATION RATE: 18 BRPM | HEIGHT: 63 IN | DIASTOLIC BLOOD PRESSURE: 75 MMHG

## 2024-12-15 PROBLEM — K58.9 IRRITABLE BOWEL SYNDROME: Status: ACTIVE | Noted: 2024-12-15

## 2024-12-15 PROBLEM — M96.1 POSTLAMINECTOMY SYNDROME OF LUMBAR REGION: Status: ACTIVE | Noted: 2024-12-15

## 2024-12-15 PROBLEM — R56.9 SEIZURE (MULTI): Status: RESOLVED | Noted: 2024-12-14 | Resolved: 2024-12-15

## 2024-12-15 PROBLEM — M67.919 DISORDER OF ROTATOR CUFF: Status: ACTIVE | Noted: 2024-12-15

## 2024-12-15 LAB
ALBUMIN SERPL BCP-MCNC: 3.8 G/DL (ref 3.4–5)
ANION GAP SERPL CALC-SCNC: 11 MMOL/L (ref 10–20)
BASOPHILS # BLD AUTO: 0.04 X10*3/UL (ref 0–0.1)
BASOPHILS NFR BLD AUTO: 0.6 %
BUN SERPL-MCNC: 7 MG/DL (ref 6–23)
CALCIUM SERPL-MCNC: 8.7 MG/DL (ref 8.6–10.3)
CHLORIDE SERPL-SCNC: 107 MMOL/L (ref 98–107)
CO2 SERPL-SCNC: 27 MMOL/L (ref 21–32)
CREAT SERPL-MCNC: 0.97 MG/DL (ref 0.5–1.05)
EGFRCR SERPLBLD CKD-EPI 2021: 71 ML/MIN/1.73M*2
EOSINOPHIL # BLD AUTO: 0.17 X10*3/UL (ref 0–0.7)
EOSINOPHIL NFR BLD AUTO: 2.6 %
ERYTHROCYTE [DISTWIDTH] IN BLOOD BY AUTOMATED COUNT: 12.2 % (ref 11.5–14.5)
GLUCOSE SERPL-MCNC: 91 MG/DL (ref 74–99)
HCT VFR BLD AUTO: 41.4 % (ref 36–46)
HGB BLD-MCNC: 13.8 G/DL (ref 12–16)
HOLD SPECIMEN: NORMAL
IMM GRANULOCYTES # BLD AUTO: 0.02 X10*3/UL (ref 0–0.7)
IMM GRANULOCYTES NFR BLD AUTO: 0.3 % (ref 0–0.9)
LYMPHOCYTES # BLD AUTO: 2.06 X10*3/UL (ref 1.2–4.8)
LYMPHOCYTES NFR BLD AUTO: 31.1 %
MAGNESIUM SERPL-MCNC: 2.08 MG/DL (ref 1.6–2.4)
MCH RBC QN AUTO: 28.6 PG (ref 26–34)
MCHC RBC AUTO-ENTMCNC: 33.3 G/DL (ref 32–36)
MCV RBC AUTO: 86 FL (ref 80–100)
MONOCYTES # BLD AUTO: 0.44 X10*3/UL (ref 0.1–1)
MONOCYTES NFR BLD AUTO: 6.6 %
NEUTROPHILS # BLD AUTO: 3.89 X10*3/UL (ref 1.2–7.7)
NEUTROPHILS NFR BLD AUTO: 58.8 %
NRBC BLD-RTO: 0 /100 WBCS (ref 0–0)
PHOSPHATE SERPL-MCNC: 3.1 MG/DL (ref 2.5–4.9)
PLATELET # BLD AUTO: 216 X10*3/UL (ref 150–450)
POTASSIUM SERPL-SCNC: 3.9 MMOL/L (ref 3.5–5.3)
RBC # BLD AUTO: 4.83 X10*6/UL (ref 4–5.2)
SODIUM SERPL-SCNC: 141 MMOL/L (ref 136–145)
WBC # BLD AUTO: 6.6 X10*3/UL (ref 4.4–11.3)

## 2024-12-15 PROCEDURE — 70553 MRI BRAIN STEM W/O & W/DYE: CPT | Performed by: RADIOLOGY

## 2024-12-15 PROCEDURE — 80069 RENAL FUNCTION PANEL: CPT

## 2024-12-15 PROCEDURE — 83735 ASSAY OF MAGNESIUM: CPT

## 2024-12-15 PROCEDURE — 36415 COLL VENOUS BLD VENIPUNCTURE: CPT

## 2024-12-15 PROCEDURE — 70553 MRI BRAIN STEM W/O & W/DYE: CPT

## 2024-12-15 PROCEDURE — G0378 HOSPITAL OBSERVATION PER HR: HCPCS

## 2024-12-15 PROCEDURE — 99223 1ST HOSP IP/OBS HIGH 75: CPT | Performed by: STUDENT IN AN ORGANIZED HEALTH CARE EDUCATION/TRAINING PROGRAM

## 2024-12-15 PROCEDURE — A9575 INJ GADOTERATE MEGLUMI 0.1ML: HCPCS | Performed by: INTERNAL MEDICINE

## 2024-12-15 PROCEDURE — 85025 COMPLETE CBC W/AUTO DIFF WBC: CPT

## 2024-12-15 PROCEDURE — 2550000001 HC RX 255 CONTRASTS: Performed by: INTERNAL MEDICINE

## 2024-12-15 PROCEDURE — 2500000001 HC RX 250 WO HCPCS SELF ADMINISTERED DRUGS (ALT 637 FOR MEDICARE OP)

## 2024-12-15 PROCEDURE — 99239 HOSP IP/OBS DSCHRG MGMT >30: CPT

## 2024-12-15 RX ORDER — GADOTERATE MEGLUMINE 376.9 MG/ML
0.2 INJECTION INTRAVENOUS
Status: COMPLETED | OUTPATIENT
Start: 2024-12-15 | End: 2024-12-15

## 2024-12-15 RX ORDER — PREGABALIN 150 MG/1
150 CAPSULE ORAL 3 TIMES DAILY
COMMUNITY

## 2024-12-15 RX ORDER — TRAZODONE HYDROCHLORIDE 50 MG/1
50 TABLET ORAL NIGHTLY
COMMUNITY

## 2024-12-15 RX ADMIN — BUSPIRONE HYDROCHLORIDE 7.5 MG: 15 TABLET ORAL at 06:12

## 2024-12-15 RX ADMIN — LEVETIRACETAM 500 MG: 500 TABLET, FILM COATED ORAL at 08:22

## 2024-12-15 RX ADMIN — GADOTERATE MEGLUMINE 15 ML: 376.9 INJECTION INTRAVENOUS at 12:53

## 2024-12-15 RX ADMIN — DESVENLAFAXINE SUCCINATE 50 MG: 25 TABLET, EXTENDED RELEASE ORAL at 08:22

## 2024-12-15 RX ADMIN — PREGABALIN 150 MG: 75 CAPSULE ORAL at 08:22

## 2024-12-15 RX ADMIN — FERROUS SULFATE TAB 325 MG (65 MG ELEMENTAL FE) 325 MG: 325 (65 FE) TAB at 06:11

## 2024-12-15 ASSESSMENT — COGNITIVE AND FUNCTIONAL STATUS - GENERAL
MOBILITY SCORE: 24
DAILY ACTIVITIY SCORE: 24

## 2024-12-15 ASSESSMENT — PAIN SCALES - GENERAL: PAINLEVEL_OUTOF10: 0 - NO PAIN

## 2024-12-15 ASSESSMENT — ENCOUNTER SYMPTOMS
DEPRESSION: 0
LOSS OF SENSATION IN FEET: 0
OCCASIONAL FEELINGS OF UNSTEADINESS: 0

## 2024-12-15 ASSESSMENT — PAIN - FUNCTIONAL ASSESSMENT: PAIN_FUNCTIONAL_ASSESSMENT: 0-10

## 2024-12-15 ASSESSMENT — ACTIVITIES OF DAILY LIVING (ADL): LACK_OF_TRANSPORTATION: NO

## 2024-12-15 NOTE — DISCHARGE SUMMARY
Discharge Diagnosis  Seizure, first occurrence, resolved     Issues Requiring Follow-Up  Follow up with PCP for reeval   Follow up with neurology for seizure  Follow up with psychiatry for review of medications that may lower seizure threshold  No driving for 6 months.     Discharge Meds     Medication List      CONTINUE taking these medications     busPIRone 7.5 mg tablet; Commonly known as: Buspar   desvenlafaxine 50 mg 24 hr tablet; Commonly known as: Pristiq   FeroSuL tablet; Generic drug: ferrous sulfate (325 mg ferrous sulfate)   hydrOXYzine pamoate 25 mg capsule; Commonly known as: Vistaril   pregabalin 150 mg capsule; Commonly known as: Lyrica   Rexulti 1 mg tablet; Generic drug: brexpiprazole   tiZANidine 4 mg tablet; Commonly known as: Zanaflex   traZODone 50 mg tablet; Commonly known as: Desyrel       Test Results Pending At Discharge  Pending Labs       No current pending labs.            Hospital Course  Jabier Lara is a 49 y.o. female with past medical history of iron deficiency anemia, fibromyalgia, anxiety/depression presents [facility] ED with chief complaint of seizure. Story per family appears to be consistent with a seizure episode including with a post ictal state witnessed by family and elevated lactate on arrival to ED. She has a hx of passing out due to hypoglycemia. First time seizure thought to be due to dehydration after strenuous activity. She was given Keppra on admission. Neurology on board, recommended MRI brain w w/o, no need for Keppra, routine outpatient EEG. MRI did not show obvious cause of seizure; no acute process. Neuro also reviewed MRI.   Pt discharged in a stable condition. Will need to avoid driving for at least 6 months and follow up with a neurologist including for clearance. Follow up with PCP for reeval. Follow up with psychiatry for review of psychiatric medications that may lower seizure threshold. Outpatient EEG ordered.     Pertinent Physical Exam At Time of  Discharge  Physical Exam  Vitals reviewed.   Constitutional:       General: She is not in acute distress.  HENT:      Head: Normocephalic and atraumatic.   Eyes:      Extraocular Movements: Extraocular movements intact.      Conjunctiva/sclera: Conjunctivae normal.   Cardiovascular:      Rate and Rhythm: Normal rate and regular rhythm.      Heart sounds: No murmur heard.     No friction rub. No gallop.   Pulmonary:      Effort: Pulmonary effort is normal.      Breath sounds: Normal breath sounds. No wheezing, rhonchi or rales.   Abdominal:      General: Bowel sounds are normal.      Palpations: Abdomen is soft. There is no mass.      Tenderness: There is no abdominal tenderness. There is no guarding or rebound.   Musculoskeletal:         General: No tenderness.      Cervical back: Neck supple.      Right lower leg: No edema.      Left lower leg: No edema.   Skin:     General: Skin is warm and dry.      Findings: No bruising or rash.   Neurological:      Mental Status: She is alert. Mental status is at baseline.      Comments: Answering questions, following commands. Moving all extremities.    Psychiatric:         Mood and Affect: Mood and affect normal.         Outpatient Follow-Up  No future appointments.      Carol Fuentes DO

## 2024-12-15 NOTE — NURSING NOTE
1540: pt discharge home with daughter, IV removed and all discharge information reviewed and all question answered.
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
## 2024-12-15 NOTE — DISCHARGE INSTRUCTIONS
Your [symptoms] were due to ____, which is ___. You are now stable enough to be discharged home.    The following recommendations are made for you at time of hospital discharge:  - Please take your home medications as instructed prior to hospitalization.   - The following changes to your home medications have been made during your hospital stay: ___  - I have ordered a brain wave test called an EEG to further evaluate your symptoms. You can schedule this by calling [phone]   - Please follow-up with your primary care provider within 5-7 days from time of discharge. Please call your PCP's office to schedule an appointment. Likely will need to bring photo ID and insurance card to your appointment.   -   services has been requested to make an appointment for you however if you do not hear back from them within 2-3 days, please call [phone] to find out about appointments with  services.  - If you experience any worsening symptoms or any new acute concerns arise, please contact your primary care provider to discuss and possibly arrange an appointment. If you cannot get in touch with provider or severe symptoms are present, please return to nearest emergency room/urgent care for evaluation and treatment.

## 2024-12-15 NOTE — ASSESSMENT & PLAN NOTE
-CT head negative, EKG unremarkable  -s/p Keppra load 1625mg, cont Keppra 500mg BID tomorrow  -neuro consulted  -defer MRI imaging until neuro evaluation  -tele ordered  -seizure precaution

## 2024-12-15 NOTE — PROGRESS NOTES
12/15/24 1300   Discharge Planning   Expected Discharge Disposition Home  (Pt aware and fine with dc today. Patient denies home going needs. Patient will arrange transport home once RN gives her dc time)

## 2024-12-15 NOTE — CONSULTS
"Date of Service: 12/15/24  Patient: Jabier Lara  MRN: [id number]    History of Present Illness:   Ms. Lara is a 49 y.o. female who presented to [facility] for episode of loss of consciousness. Jabier Lara's past medical history is pertinent for iron deficiency anemia, fibromyalgia, anxiety/depression. Neurology is consulted for seizure. History provided by the patient and her son, Yovani.    The patient was helping her son move yesterday and was becoming winded after climbing multiple flights of stairs. She started to feel \"intoxicated\" and weak and told her son she was \"going to go down.\" She lost consciousness and her son caught her. No head trauma. Her arms were outstretched with hands extended at the wrists. Her arms started to shake. She was drooling. Her son reports that the shaking lasted no more than a minutes. She then would open her eyes briefly and look around but then quickly start snoring.  The patient does not recall the episode. She remembers feeling like she was going to pass out and then next remembers waking in the ambulance.    On arrival to the ED she was found to be hypokalemic to 3.0 with a mild PETTY and had a lactate of 9.8 that has since normalized. CTH did not show any acute abnormalities.    She reports one prior episode of loss of consciousness a few years ago in which she was reportedly hypoglycemic and passed out briefly. No convulsions at that time.    EPILEPSY RISK FACTORS:  Gestation and Birth: normal  Febrile Seizures: Denies  Milestones: Normal  CNS Infections: Denies  CNS Surgeries: Denies  Head Trauma: Denies  FHx of Seizures: Denies  Currently Driving?: Yes    Review of Systems:  The systems were reviewed with pertinent positives and negatives documented in the HPI.      Past Medical & Surgical History  iron deficiency anemia, fibromyalgia, anxiety/depression    Social History:   Social History     Tobacco Use    Smoking status: Never    Smokeless tobacco: Never " "  Substance Use Topics    Alcohol use: Never     Family History:   No family history of epilepsy     Medications:     Current Facility-Administered Medications:     ARIPiprazole (Abilify) tablet 10 mg, 10 mg, oral, Nightly, Serg Souza DO    busPIRone (Buspar) tablet 7.5 mg, 7.5 mg, oral, q12h CLAUDIA, Serg Souza DO, 7.5 mg at 12/15/24 0612    desvenlafaxine succinate (Pristiq) 24 hour tablet 50 mg, 50 mg, oral, Daily, Serg Souza DO, 50 mg at 12/15/24 0822    enoxaparin (Lovenox) syringe 40 mg, 40 mg, subcutaneous, q24h, Serg Souza DO    ferrous sulfate (325 mg ferrous sulfate) tablet 325 mg, 1 tablet, oral, Daily before breakfast, Serg Souza DO, 325 mg at 12/15/24 0611    flu vaccine trivalent (PF) (Fluarix/Fluzone/Flulaval) 6 months or greater injection, 0.5 mL, intramuscular, During hospitalization, Ken Ojeda DO    hydrOXYzine pamoate (Vistaril) capsule 50 mg, 50 mg, oral, 4x daily PRN, Serg Souza DO    levETIRAcetam (Keppra) tablet 500 mg, 500 mg, oral, BID, Serg Souza DO, 500 mg at 12/15/24 0822    pregabalin (Lyrica) capsule 150 mg, 150 mg, oral, TID, Serg Souza DO, 150 mg at 12/15/24 0822    tiZANidine (Zanaflex) tablet 4 mg, 4 mg, oral, q8h PRN, Serg Souza DO, 4 mg at 12/14/24 2125     General Physical Exam:  /78 (BP Location: Right arm, Patient Position: Lying)   Pulse 51   Temp 35.8 °C (96.4 °F) (Temporal)   Resp 17   Ht 1.6 m (5' 3\")   Wt 81.6 kg (180 lb)   SpO2 97%   BMI 31.89 kg/m²      She looks well and is not in any acute distress. Breathing comfortably on room air.     Neurological Exam:   Mental status reveals her to be alert and oriented to person, place, and date. Speech is intact to expression and comprehension.     Cranial nerves:  CN 2   Visual fields full to confrontation.   CN 3, 4, 6   Pupils round, 4 mm in diameter, equally reactive to light. Lids symmetric; no ptosis. EOMs normal alignment, full range.   No nystagmus.   CN 5   Facial sensation intact bilaterally. "   CN 7   Normal and symmetric facial strength. Nasolabial folds symmetric.   CN 8   Hearing intact to conversation.   CN 9   Palate elevates symmetrically.   CN 11   Normal strength of shoulder shrug and neck turning.   CN 12   Tongue midline, with normal bulk       Motor:    R L   5 5 Shoulder abduction  5 5 Elbow flexion  5 5 Elbow extension  5 5 Finger abduction  5 5 Hip flexion  5 5 Knee flexion  5 5 Knee extension  5 5 Ankle dorsiflexion  5 5 Ankle plantarflexion     Reflexes                         R     L  Triceps          2      2  Biceps           2      2  Brachiorad    2      2  Patellar         2      2   Achilles         2      2    Babinski: toes downgoing to plantar stimulation. No clonus or other pathologic reflexes present.      Sensory:   Light Touch: Normal in all extremities     Coordination:  In both upper extremities, finger-nose-finger was intact without dysmetria or overshoot.   In both lower extremities, heel-to-shin was intact. JENNIFER were intact in both upper and lower extremities.      Results:    Lab Results   Component Value Date    HGBA1C 5.6 11/28/2022     Lab Results   Component Value Date    CKTOTAL 142 12/14/2024     CBC:   Lab Results   Component Value Date    WBC 6.6 12/15/2024    HGB 13.8 12/15/2024    HCT 41.4 12/15/2024     12/15/2024     BMP:   Lab Results   Component Value Date     12/15/2024    K 3.9 12/15/2024     12/15/2024    CO2 27 12/15/2024    BUN 7 12/15/2024    CREATININE 0.97 12/15/2024    CALCIUM 8.7 12/15/2024    MG 2.08 12/15/2024    PHOS 3.1 12/15/2024     LFT:   Lab Results   Component Value Date    ALKPHOS 93 12/14/2024    BILITOT 0.5 12/14/2024    PROT 7.1 12/14/2024    ALBUMIN 3.8 12/15/2024    ALT 16 12/14/2024    AST 18 12/14/2024     Imaging:  CT[facility]    IMPRESSION:  No acute intracranial hemorrhage or mass effect.    Impression:  Jabier Lara is a 49 y.o. who presents following episode of loss of consciousness. Episode described as  "feeling \"intoxicated\" after exerting herself while helping her son move. She lost consciousness and her son reports arms outstretched with generalized shaking for about a minute. The patient had post ictal confusion and an elevated lactate on arrival to ED. Neurological exam is normal. Description of episode concerning for first time seizure. Seizure possibly provoked by strenuous physical activity with dehydration given PETTY found on lab work up. No evidence of focal onset based on description and as this was a first time seizure okay to discontinue keppra.    Plan:  -MRI brain with and without contrast  -Discontinue keppra  -Routine outpatient EEG    I will arrange outpatient follow up in 6-8 weeks.    Educated patient that she should not drive, operate heavy machinery, work around sharp objects or open flames, work on high surfaces, or swim or bath alone, or any other activity where she would be a risk to self or others if a seizure were to occur for at least 6 months and until cleared by a neurologist.     Reviewed and approved by DEMAR MACHADO on 12/15/24 at 8:45 AM.    Medical decision making is high. Patient has acute problem that poses a threat to bodily function. I performed independent interpretation of neuroimaging, discussed management with another health professional and reviewed multiple notes from other providers. There is a high risk of morbidity.  "

## 2024-12-15 NOTE — PROGRESS NOTES
12/15/24 0829   Discharge Planning   Living Arrangements Children  (home with son Valdemar)   Support Systems Children;Family members   Assistance Needed A&OX4; independent with ADLs with no DME; drives; room air baseline currently room air; PCP ANITA Jones   Type of Residence Private residence   Number of Stairs to Enter Residence 5   Number of Stairs Within Residence 0   Do you have animals or pets at home? Yes   Type of Animals or Pets 1 dog   Who is requesting discharge planning? Provider   Expected Discharge Disposition Home  (Home no needs. Patient denies home going needs)   Does the patient need discharge transport arranged? No   Financial Resource Strain   How hard is it for you to pay for the very basics like food, housing, medical care, and heating? Not hard   Housing Stability   In the last 12 months, was there a time when you were not able to pay the mortgage or rent on time? N   In the past 12 months, how many times have you moved where you were living? 0   At any time in the past 12 months, were you homeless or living in a shelter (including now)? N   Transportation Needs   In the past 12 months, has lack of transportation kept you from medical appointments or from getting medications? no   In the past 12 months, has lack of transportation kept you from meetings, work, or from getting things needed for daily living? No

## 2024-12-15 NOTE — HOSPITAL COURSE
Jabier Lara is a 49 y.o. female with past medical history of iron deficiency anemia, fibromyalgia, anxiety/depression presents to [facility] ED with chief complaint of seizure. Story per family appears to be consistent with a seizure episode including with a post ictal state witnessed by family and elevated lactate on arrival to ED. She has a hx of passing out due to hypoglycemia. First time seizure thought to be due to dehydration after strenuous activity. She was given Keppra on admission. Neurology on board, recommended MRI brain w w/o, no need for Keppra, routine outpatient EEG. MRI did not show obvious cause of seizure; no acute process. Neuro also reviewed MRI.   Pt discharged in a stable condition. Will need to avoid driving for at least 6 months and follow up with a neurologist including for clearance. Follow up with PCP for reeval. Follow up with psychiatry for review of psychiatric medications that may lower seizure threshold. Outpatient EEG ordered.

## 2024-12-16 ENCOUNTER — TELEPHONE (OUTPATIENT)
Dept: INTERNAL MEDICINE | Facility: HOSPITAL | Age: 49
End: 2024-12-16
Payer: COMMERCIAL

## 2024-12-16 LAB
ATRIAL RATE: 71 BPM
P AXIS: 65 DEGREES
P OFFSET: 213 MS
P ONSET: 165 MS
PR INTERVAL: 104 MS
Q ONSET: 217 MS
QRS COUNT: 12 BEATS
QRS DURATION: 84 MS
QT INTERVAL: 400 MS
QTC CALCULATION(BAZETT): 434 MS
QTC FREDERICIA: 423 MS
R AXIS: 40 DEGREES
T AXIS: 60 DEGREES
T OFFSET: 417 MS
VENTRICULAR RATE: 71 BPM

## 2024-12-16 NOTE — TELEPHONE ENCOUNTER
Patient discharged yesterday (12/15/24).    Patient called today at 1311 to checkup on her following discharge from the hospital.   No response to phone call going to voicemail.  Left voicemail message with callback number.  Will update note if patient returns phone call.     Ken Ojeda DO  Hospitalist, [facility]

## 2024-12-30 ENCOUNTER — HOSPITAL ENCOUNTER (OUTPATIENT)
Dept: NEUROLOGY | Facility: HOSPITAL | Age: 49
Discharge: HOME | End: 2024-12-30
Payer: COMMERCIAL

## 2024-12-30 PROCEDURE — 95816 EEG AWAKE AND DROWSY: CPT

## 2025-01-13 ENCOUNTER — OFFICE VISIT (OUTPATIENT)
Dept: PRIMARY CARE CLINIC | Age: 50
End: 2025-01-13
Payer: COMMERCIAL

## 2025-01-13 VITALS
HEART RATE: 75 BPM | SYSTOLIC BLOOD PRESSURE: 130 MMHG | HEIGHT: 63 IN | TEMPERATURE: 97.8 F | OXYGEN SATURATION: 95 % | BODY MASS INDEX: 32.71 KG/M2 | WEIGHT: 184.6 LBS | DIASTOLIC BLOOD PRESSURE: 78 MMHG

## 2025-01-13 DIAGNOSIS — F33.2 SEVERE EPISODE OF RECURRENT MAJOR DEPRESSIVE DISORDER, WITHOUT PSYCHOTIC FEATURES (HCC): ICD-10-CM

## 2025-01-13 DIAGNOSIS — J45.20 MILD INTERMITTENT ASTHMA WITHOUT COMPLICATION: ICD-10-CM

## 2025-01-13 DIAGNOSIS — M75.52 ACUTE BURSITIS OF LEFT SHOULDER: Primary | ICD-10-CM

## 2025-01-13 DIAGNOSIS — Z09 HOSPITAL DISCHARGE FOLLOW-UP: ICD-10-CM

## 2025-01-13 PROCEDURE — G8427 DOCREV CUR MEDS BY ELIG CLIN: HCPCS | Performed by: INTERNAL MEDICINE

## 2025-01-13 PROCEDURE — 99214 OFFICE O/P EST MOD 30 MIN: CPT | Performed by: INTERNAL MEDICINE

## 2025-01-13 PROCEDURE — G8417 CALC BMI ABV UP PARAM F/U: HCPCS | Performed by: INTERNAL MEDICINE

## 2025-01-13 PROCEDURE — 1036F TOBACCO NON-USER: CPT | Performed by: INTERNAL MEDICINE

## 2025-01-13 PROCEDURE — 1111F DSCHRG MED/CURRENT MED MERGE: CPT | Performed by: INTERNAL MEDICINE

## 2025-01-13 PROCEDURE — 3017F COLORECTAL CA SCREEN DOC REV: CPT | Performed by: INTERNAL MEDICINE

## 2025-01-13 SDOH — ECONOMIC STABILITY: FOOD INSECURITY: WITHIN THE PAST 12 MONTHS, THE FOOD YOU BOUGHT JUST DIDN'T LAST AND YOU DIDN'T HAVE MONEY TO GET MORE.: NEVER TRUE

## 2025-01-13 SDOH — ECONOMIC STABILITY: FOOD INSECURITY: WITHIN THE PAST 12 MONTHS, YOU WORRIED THAT YOUR FOOD WOULD RUN OUT BEFORE YOU GOT MONEY TO BUY MORE.: NEVER TRUE

## 2025-01-13 ASSESSMENT — PATIENT HEALTH QUESTIONNAIRE - PHQ9
10. IF YOU CHECKED OFF ANY PROBLEMS, HOW DIFFICULT HAVE THESE PROBLEMS MADE IT FOR YOU TO DO YOUR WORK, TAKE CARE OF THINGS AT HOME, OR GET ALONG WITH OTHER PEOPLE: NOT DIFFICULT AT ALL
5. POOR APPETITE OR OVEREATING: NOT AT ALL
3. TROUBLE FALLING OR STAYING ASLEEP: NOT AT ALL
SUM OF ALL RESPONSES TO PHQ QUESTIONS 1-9: 0
9. THOUGHTS THAT YOU WOULD BE BETTER OFF DEAD, OR OF HURTING YOURSELF: NOT AT ALL
SUM OF ALL RESPONSES TO PHQ QUESTIONS 1-9: 0
7. TROUBLE CONCENTRATING ON THINGS, SUCH AS READING THE NEWSPAPER OR WATCHING TELEVISION: NOT AT ALL
4. FEELING TIRED OR HAVING LITTLE ENERGY: NOT AT ALL
2. FEELING DOWN, DEPRESSED OR HOPELESS: NOT AT ALL
1. LITTLE INTEREST OR PLEASURE IN DOING THINGS: NOT AT ALL
SUM OF ALL RESPONSES TO PHQ9 QUESTIONS 1 & 2: 0
SUM OF ALL RESPONSES TO PHQ QUESTIONS 1-9: 0
SUM OF ALL RESPONSES TO PHQ QUESTIONS 1-9: 0
8. MOVING OR SPEAKING SO SLOWLY THAT OTHER PEOPLE COULD HAVE NOTICED. OR THE OPPOSITE, BEING SO FIGETY OR RESTLESS THAT YOU HAVE BEEN MOVING AROUND A LOT MORE THAN USUAL: NOT AT ALL
6. FEELING BAD ABOUT YOURSELF - OR THAT YOU ARE A FAILURE OR HAVE LET YOURSELF OR YOUR FAMILY DOWN: NOT AT ALL

## 2025-01-13 NOTE — PROGRESS NOTES
Post-Discharge Transitional Care Management Progress Note      Gualberto cShwartz   YOB: 1975    Date of Office Visit:  1/13/2025  Date of Hospital Admission: 12/14/2024  Date of Hospital Discharge: 12/15/2024    Care management risk score Rising risk (score 2-5) and Complex Care (Scores >=6): No Risk Score On File     Non face to face  following discharge, date last encounter closed (first attempt may have been earlier): *No documented post hospital discharge outreach found in the last 14 days *No documented post hospital discharge outreach found in the last 14 days    Call initiated 2 business days of discharge: *No response recorded in the last 14 days    ASSESSMENT/PLAN:   Seizure (Multi)  -CT head negative, EKG unremarkable  -s/p Keppra load 1625mg, cont Keppra 500mg BID tomorrow  -neuro consulted  -defer MRI imaging until neuro evaluation  -tele ordered  -seizure precaution  Fibromyalgia  -cont home Abilify, BuSpar, Lrica   Anxiety disorder  -Continue home Pristiq, Vistaril  Depressive disorder  -Continue home Pristiq      Medical Decision Making: high complexity  Return in about 4 months (around 5/13/2025).    On this date 1/13/2025 I have spent 35 minutes reviewing previous notes, test results and face to face with the patient discussing the diagnosis and importance of compliance with the treatment plan as well as documenting on the day of the visit.     Subjective:   HPI:  Follow up of Hospital problems/diagnosis(es):   Gualberto Schwartz is a 49 y.o. female with past medical history of iron deficiency anemia, fibromyalgia, anxiety/depression presents to [facility] ED with chief complaint of seizure. Story per family appears to be consistent with a seizure episode including with a post ictal state witnessed by family and elevated lactate on arrival to ED. She has a hx of passing out due to hypoglycemia. First time seizure thought to be due to dehydration after strenuous activity. She was given

## 2025-01-21 ENCOUNTER — OFFICE VISIT (OUTPATIENT)
Dept: ORTHOPEDIC SURGERY | Age: 50
End: 2025-01-21
Payer: COMMERCIAL

## 2025-01-21 VITALS — BODY MASS INDEX: 32.6 KG/M2 | WEIGHT: 184 LBS | HEIGHT: 63 IN

## 2025-01-21 DIAGNOSIS — M75.52 SUBACROMIAL BURSITIS OF LEFT SHOULDER JOINT: Primary | ICD-10-CM

## 2025-01-21 PROCEDURE — 1036F TOBACCO NON-USER: CPT | Performed by: FAMILY MEDICINE

## 2025-01-21 PROCEDURE — G8427 DOCREV CUR MEDS BY ELIG CLIN: HCPCS | Performed by: FAMILY MEDICINE

## 2025-01-21 PROCEDURE — 20611 DRAIN/INJ JOINT/BURSA W/US: CPT | Performed by: FAMILY MEDICINE

## 2025-01-21 PROCEDURE — 3017F COLORECTAL CA SCREEN DOC REV: CPT | Performed by: FAMILY MEDICINE

## 2025-01-21 PROCEDURE — 99203 OFFICE O/P NEW LOW 30 MIN: CPT | Performed by: FAMILY MEDICINE

## 2025-01-21 PROCEDURE — G8417 CALC BMI ABV UP PARAM F/U: HCPCS | Performed by: FAMILY MEDICINE

## 2025-01-21 RX ORDER — BETAMETHASONE SODIUM PHOSPHATE AND BETAMETHASONE ACETATE 3; 3 MG/ML; MG/ML
12 INJECTION, SUSPENSION INTRA-ARTICULAR; INTRALESIONAL; INTRAMUSCULAR; SOFT TISSUE ONCE
Status: COMPLETED | OUTPATIENT
Start: 2025-01-21 | End: 2025-01-21

## 2025-01-21 RX ORDER — LIDOCAINE HYDROCHLORIDE 10 MG/ML
2 INJECTION, SOLUTION INFILTRATION; PERINEURAL ONCE
Status: COMPLETED | OUTPATIENT
Start: 2025-01-21 | End: 2025-01-21

## 2025-01-21 RX ADMIN — LIDOCAINE HYDROCHLORIDE 2 ML: 10 INJECTION, SOLUTION INFILTRATION; PERINEURAL at 13:41

## 2025-01-21 RX ADMIN — BETAMETHASONE SODIUM PHOSPHATE AND BETAMETHASONE ACETATE 12 MG: 3; 3 INJECTION, SUSPENSION INTRA-ARTICULAR; INTRALESIONAL; INTRAMUSCULAR; SOFT TISSUE at 13:41

## 2025-01-21 ASSESSMENT — ENCOUNTER SYMPTOMS
VOMITING: 0
CHEST TIGHTNESS: 0
ABDOMINAL PAIN: 0
NAUSEA: 0
COUGH: 0
CONSTIPATION: 0
DIARRHEA: 0
SHORTNESS OF BREATH: 0

## 2025-01-21 NOTE — PROGRESS NOTES
[facility]  PRIMARY CARE SPORTS MEDICINE  DATE OF VISIT : 2025    Patient : Gualberto Schwartz  Age : 49 y.o.   : 1975  MRN : [id number]   ______________________________________________________________________    Chief Complaint :   Chief Complaint   Patient presents with    Shoulder Pain     Pain in the left shoulder started about 3 weeks ago with no known injury. Patient is RHD. Patient has numbness and tingling in her left arm. Pain is rated as a 4-6/10 and is constant. Patient uses a Therma care patch and motrin/tylenol for pain relief.        HPI : Gualberto Schwartz is 49 y.o. right hand dominant female who presented to the clinic for evaluation of left shoulder pain.     Today 2025, she says the pain started 3 week(s) ago. The pain started with no unknown injury and has not changed since onset. She localizes the pain to the lateral shoulder. It does radiate to the posterior shoulder and into the shoulder blade. She rates the pain 4-6/10 in the office today. The pain is worse with reaching across her body or reaching, reaching overhead. She has tried treating it with thermacare patches and motrin and tylenol. Denies numbness and tingling    ROS:  All pertinent positive symptoms are included in the history of present illness.    Review of Systems   Constitutional:  Negative for chills and fever.   Respiratory:  Negative for cough, chest tightness and shortness of breath.    Cardiovascular:  Negative for chest pain and palpitations.   Gastrointestinal:  Negative for abdominal pain, constipation, diarrhea, nausea and vomiting.   Genitourinary:  Negative for dysuria and frequency.   Musculoskeletal:  Negative for gait problem and joint swelling.   Skin:  Negative for rash and wound.   Hematological:  Does not bruise/bleed easily.   All other systems reviewed and are negative.         Past Medical History :  Past Medical History:   Diagnosis Date    Anemia     Anxiety     Arthritis     Asthma

## 2025-01-22 ENCOUNTER — TELEPHONE (OUTPATIENT)
Dept: ORTHOPEDIC SURGERY | Age: 50
End: 2025-01-22

## 2025-01-22 NOTE — TELEPHONE ENCOUNTER
Patient called and states she had a cortisone injection yesterday and she ended up with a migraine last night and nausea.  She states the migraine has gone away but she still has a bad headache and some nausea.  She wanted to call to inform and let us know.    I did tell her she can get a headache and nausea from Cortisone Injection.

## 2025-02-03 NOTE — PROGRESS NOTES
"Date of Service: 2/5/2025  Patient: Jabier Lara  MRN: [id number]  Referring Provider: Ken Ojeda DO  PCP: Dylan Jones MD    History of Present Illness:   Ms. Lara is a 49 y.o. female who presents to neurology clinic for post hospital follow up of first time seizure. Jabier Lara's past medical history is pertinent for iron deficiency anemia, fibromyalgia, anxiety/depression.     The patient was seen 12/15/24 at [facility]. She patient was helping her son move the day of admission and was becoming winded after climbing multiple flights of stairs. She started to feel \"intoxicated\" and weak and told her son she was \"going to go down.\" She lost consciousness and her son caught her. No head trauma. Her arms were outstretched with hands extended at the wrists. Her arms started to shake. She was drooling. Her son reports that the shaking lasted no more than a minutes. She then would open her eyes briefly and look around but then quickly start snoring.  The patient does not recall the episode. She remembers feeling like she was going to pass out and then next remembers waking in the ambulance.     She reports one prior episode of loss of consciousness a few years ago in which she was reportedly hypoglycemic and passed out briefly. No convulsions at that time.     No new episodes of loss of consciousness or dizziness. No concerning episodes for seizures. Routine EEG was negative.    EPILEPSY RISK FACTORS:  Gestation and Birth: normal  Febrile Seizures: Denies  Milestones: Normal  CNS Infections: Denies  CNS Surgeries: Denies  Head Trauma: Denies  FHx of Seizures: Denies  Currently Driving?: No    Review of Systems:  The systems were reviewed with pertinent positives and negatives documented in the HPI.      Past Medical & Surgical History  Iron deficiency anemia, fibromyalgia, anxiety/depression    Social History:   Social History     Tobacco Use    Smoking status: Never    Smokeless tobacco: " "Never   Substance Use Topics    Alcohol use: Never     Family History:   No family history of seizures     Medications:     Current Outpatient Medications:     busPIRone (Buspar) 7.5 mg tablet, Take 1 tablet (7.5 mg) by mouth every 12 hours., Disp: , Rfl:     desvenlafaxine 50 mg 24 hr tablet, Take 1 tablet (50 mg) by mouth once daily., Disp: , Rfl:     FeroSuL tablet, Take 1 tablet (325 mg) by mouth once daily in the morning. Take before meals., Disp: , Rfl:     hydrOXYzine pamoate (Vistaril) 25 mg capsule, Take 2 capsules (50 mg) by mouth 4 times a day as needed for itching or allergies., Disp: , Rfl:     pregabalin (Lyrica) 150 mg capsule, Take 1 capsule (150 mg) by mouth 3 times a day., Disp: , Rfl:     tiZANidine (Zanaflex) 4 mg tablet, Take 1 tablet (4 mg) by mouth every 8 hours if needed for muscle spasms., Disp: , Rfl:     traZODone (Desyrel) 50 mg tablet, Take 1 tablet (50 mg) by mouth once daily at bedtime., Disp: , Rfl:     brexpiprazole (Rexulti) 1 mg tablet, Take 1 tablet (1 mg) by mouth once daily. (Patient not taking: Reported on 2/5/2025), Disp: , Rfl:      General Physical Exam:  /62 (BP Location: Left arm, Patient Position: Sitting, BP Cuff Size: Adult)   Temp 35.8 °C (96.5 °F) (Temporal)   Ht 1.6 m (5' 3\")   Wt 82.2 kg (181 lb 3.2 oz)   BMI 32.10 kg/m²      She looks well and is not in any acute distress. Breathing comfortably on room air.     Neurological Exam:   Mental status reveals her to be alert and oriented. Speech is intact to expression, comprehension.     Cranial nerves:  CN 2   Visual fields full to confrontation.   CN 3, 4, 6   Pupils round, 4 mm in diameter, equally reactive to light. Lids symmetric; no ptosis. EOMs normal alignment, full range.   No nystagmus.   CN 5   Facial sensation intact bilaterally.   CN 7   Normal and symmetric facial strength. Nasolabial folds symmetric.   CN 8   Hearing intact to conversation.   CN 9   Palate elevates symmetrically.   CN 11 " "  Normal strength of shoulder shrug and neck turning.   CN 12   Tongue midline, with normal bulk     Motor:    R L   5 5 Shoulder abduction  5 5 Elbow flexion  5 5 Elbow extension  5 5 Finger abduction  5 5 Hip flexion  5 5 Knee flexion  5 5 Knee extension  5 5 Ankle dorsiflexion  5 5 Ankle plantarflexion     Reflexes                         R     L  Triceps          2      2  Biceps           2      2  Brachiorad    2      2  Patellar         2      2   Achilles         2      2    Sensory:   Light Touch: Normal in all extremities     Coordination:  In both upper extremities, finger-nose-finger was intact without dysmetria or overshoot.   In both lower extremities, heel-to-shin was intact.   Gait:  Station was stable with a normal base. Gait was stable with a normal arm swing and speed.    Results:    Lab Results   Component Value Date    HGBA1C 5.6 11/28/2022     Lab Results   Component Value Date    CKTOTAL 142 12/14/2024     CBC:   Lab Results   Component Value Date    WBC 6.6 12/15/2024    HGB 13.8 12/15/2024    HCT 41.4 12/15/2024     12/15/2024     BMP:   Lab Results   Component Value Date     12/15/2024    K 3.9 12/15/2024     12/15/2024    CO2 27 12/15/2024    BUN 7 12/15/2024    CREATININE 0.97 12/15/2024    CALCIUM 8.7 12/15/2024    MG 2.08 12/15/2024    PHOS 3.1 12/15/2024     LFT:   Lab Results   Component Value Date    ALKPHOS 93 12/14/2024    BILITOT 0.5 12/14/2024    PROT 7.1 12/14/2024    ALBUMIN 3.8 12/15/2024    ALT 16 12/14/2024    AST 18 12/14/2024     Routine EEG  12/30/2024  This is a normal awake EEG. No epileptiform discharges or lateralizing signs were seen.    Impression:  Jabier Lara is a 49 y.o. who presents following first time seizure. Episode described as feeling \"intoxicated\" after exerting herself while helping her son move. She lost consciousness and her son reports arms outstretched with generalized shaking for about a minute. The patient had post ictal " confusion and an elevated lactate on arrival to ED. Neurological exam is normal. MRI brain with mild chronic small vessel ischemic changes. Routine EEG was negative. No evidence of focal onset based on description. Description of episode concerning for first time seizure. Seizure possibly provoked by strenuous physical activity with dehydration given PETTY found on lab work up. She was also recently starting on rexulti at the time of the event. As this was a first time seizure without focality or abnormalities on MRI brain or EEG will hold off on seizure medications.    Educated patient that she should not drive, operate heavy machinery, work around sharp objects or open flames, work on high surfaces, or swim or bath alone, or any other activity where she would be a risk to self or others if a seizure were to occur for at least 6 months and until cleared by a neurologist.    She will follow up in June. If no additional seizure-like episodes she can be cleared to drive at that time.     Reviewed and approved by DEMAR MACHADO on 2/5/25 at 9:33 AM.    I personally spent 20 minutes on the day of the visit completing the review of the medical record and outside records, obtaining history and performing an appropriate physical exam, patient care, counseling and education, placing orders, independently reviewing results, communicating with the patient/friend, coordinating care and performing appropriate clinical documentation.

## 2025-02-05 ENCOUNTER — APPOINTMENT (OUTPATIENT)
Dept: NEUROLOGY | Facility: CLINIC | Age: 50
End: 2025-02-05
Payer: COMMERCIAL

## 2025-02-05 VITALS
TEMPERATURE: 96.5 F | SYSTOLIC BLOOD PRESSURE: 124 MMHG | BODY MASS INDEX: 32.11 KG/M2 | DIASTOLIC BLOOD PRESSURE: 62 MMHG | WEIGHT: 181.2 LBS | HEIGHT: 63 IN

## 2025-02-05 DIAGNOSIS — R56.9 SEIZURE (MULTI): Primary | ICD-10-CM

## 2025-02-05 PROCEDURE — 3008F BODY MASS INDEX DOCD: CPT | Performed by: STUDENT IN AN ORGANIZED HEALTH CARE EDUCATION/TRAINING PROGRAM

## 2025-02-05 PROCEDURE — 1036F TOBACCO NON-USER: CPT | Performed by: STUDENT IN AN ORGANIZED HEALTH CARE EDUCATION/TRAINING PROGRAM

## 2025-02-05 PROCEDURE — 99213 OFFICE O/P EST LOW 20 MIN: CPT | Performed by: STUDENT IN AN ORGANIZED HEALTH CARE EDUCATION/TRAINING PROGRAM

## 2025-02-20 ENCOUNTER — HOSPITAL ENCOUNTER (OUTPATIENT)
Dept: INFUSION THERAPY | Age: 50
Discharge: HOME | End: 2025-02-20
Payer: COMMERCIAL

## 2025-02-20 DIAGNOSIS — R76.8 ELEVATED SERUM IMMUNOGLOBULIN FREE LIGHT CHAIN LEVEL: ICD-10-CM

## 2025-02-20 DIAGNOSIS — R89.8 ABNORMAL BONE MARROW EXAMINATION: ICD-10-CM

## 2025-02-20 DIAGNOSIS — E61.1 IRON DEFICIENCY: ICD-10-CM

## 2025-02-20 LAB
ALBUMIN SERPL-MCNC: 4.3 G/DL (ref 3.5–5.2)
ALP SERPL-CCNC: 129 U/L (ref 35–104)
ALT SERPL-CCNC: 12 U/L (ref 0–32)
ANION GAP SERPL CALCULATED.3IONS-SCNC: 10 MMOL/L (ref 7–16)
AST SERPL-CCNC: 15 U/L (ref 0–31)
BASOPHILS # BLD: 0.05 K/UL (ref 0–0.2)
BASOPHILS NFR BLD: 1 % (ref 0–2)
BILIRUB SERPL-MCNC: 0.3 MG/DL (ref 0–1.2)
BUN SERPL-MCNC: 13 MG/DL (ref 6–20)
CALCIUM SERPL-MCNC: 9.9 MG/DL (ref 8.6–10.2)
CHLORIDE SERPL-SCNC: 100 MMOL/L (ref 98–107)
CO2 SERPL-SCNC: 28 MMOL/L (ref 22–29)
CREAT SERPL-MCNC: 1 MG/DL (ref 0.5–1)
EOSINOPHIL # BLD: 0.12 K/UL (ref 0.05–0.5)
EOSINOPHILS RELATIVE PERCENT: 2 % (ref 0–6)
ERYTHROCYTE [DISTWIDTH] IN BLOOD BY AUTOMATED COUNT: 12.6 % (ref 11.5–15)
GFR, ESTIMATED: 71 ML/MIN/1.73M2
GLUCOSE SERPL-MCNC: 94 MG/DL (ref 74–99)
HCT VFR BLD AUTO: 47.3 % (ref 34–48)
HGB BLD-MCNC: 15.6 G/DL (ref 11.5–15.5)
IMM GRANULOCYTES # BLD AUTO: 0.03 K/UL (ref 0–0.58)
IMM GRANULOCYTES NFR BLD: 0 % (ref 0–5)
LYMPHOCYTES NFR BLD: 1.21 K/UL (ref 1.5–4)
LYMPHOCYTES RELATIVE PERCENT: 16 % (ref 20–42)
MCH RBC QN AUTO: 29 PG (ref 26–35)
MCHC RBC AUTO-ENTMCNC: 33 G/DL (ref 32–34.5)
MCV RBC AUTO: 87.9 FL (ref 80–99.9)
MONOCYTES NFR BLD: 0.54 K/UL (ref 0.1–0.95)
MONOCYTES NFR BLD: 7 % (ref 2–12)
NEUTROPHILS NFR BLD: 74 % (ref 43–80)
NEUTS SEG NFR BLD: 5.56 K/UL (ref 1.8–7.3)
PLATELET # BLD AUTO: 216 K/UL (ref 130–450)
PMV BLD AUTO: 11.3 FL (ref 7–12)
POTASSIUM SERPL-SCNC: 4.5 MMOL/L (ref 3.5–5)
PROT SERPL-MCNC: 7.4 G/DL (ref 6.4–8.3)
RBC # BLD AUTO: 5.38 M/UL (ref 3.5–5.5)
SODIUM SERPL-SCNC: 138 MMOL/L (ref 132–146)
WBC OTHER # BLD: 7.5 K/UL (ref 4.5–11.5)

## 2025-02-20 PROCEDURE — 36415 COLL VENOUS BLD VENIPUNCTURE: CPT

## 2025-02-20 PROCEDURE — 80053 COMPREHEN METABOLIC PANEL: CPT

## 2025-02-20 PROCEDURE — 84155 ASSAY OF PROTEIN SERUM: CPT

## 2025-02-20 PROCEDURE — 84165 PROTEIN E-PHORESIS SERUM: CPT

## 2025-02-20 PROCEDURE — 83521 IG LIGHT CHAINS FREE EACH: CPT

## 2025-02-20 PROCEDURE — 85025 COMPLETE CBC W/AUTO DIFF WBC: CPT

## 2025-02-21 ENCOUNTER — OFFICE VISIT (OUTPATIENT)
Dept: ONCOLOGY | Age: 50
End: 2025-02-21

## 2025-02-21 VITALS
HEART RATE: 71 BPM | BODY MASS INDEX: 32 KG/M2 | DIASTOLIC BLOOD PRESSURE: 88 MMHG | TEMPERATURE: 98.1 F | SYSTOLIC BLOOD PRESSURE: 115 MMHG | WEIGHT: 180.6 LBS | OXYGEN SATURATION: 99 % | HEIGHT: 63 IN

## 2025-02-21 DIAGNOSIS — R76.8 ELEVATED SERUM IMMUNOGLOBULIN FREE LIGHT CHAIN LEVEL: ICD-10-CM

## 2025-02-21 DIAGNOSIS — E61.1 IRON DEFICIENCY: Primary | ICD-10-CM

## 2025-02-21 DIAGNOSIS — R89.8 ABNORMAL BONE MARROW EXAMINATION: ICD-10-CM

## 2025-02-21 LAB
ALBUMIN SERPL-MCNC: 3.7 G/DL (ref 3.5–4.7)
ALPHA1 GLOB SERPL ELPH-MCNC: 0.2 G/DL (ref 0.2–0.4)
ALPHA2 GLOB SERPL ELPH-MCNC: 0.9 G/DL (ref 0.5–1)
B-GLOBULIN SERPL ELPH-MCNC: 1.1 G/DL (ref 0.8–1.3)
GAMMA GLOB SERPL ELPH-MCNC: 1.2 G/DL (ref 0.7–1.6)
PATHOLOGIST: NORMAL
PROT PATTERN SERPL ELPH-IMP: NORMAL
PROT SERPL-MCNC: 7.1 G/DL (ref 6.4–8.3)

## 2025-02-21 NOTE — PROGRESS NOTES
equals 11.6 mGy PROCEDURE: : Arie Valencia MD Informed consent was obtained and universal protocol was observed.  Time out was performed with confirmation of patient identity, procedure to be performed and site. A skin entry site was selected with fluoroscopy.  Under standard sterile condition, local anesthesia with subcutaneous 1% lidocaine was administered. A 22 gauge spinal needle was inserted into the joint under fluoroscopic guidance.  0.5 cc of iodinated contrast was injected to confirm position.  I then injected a total of 8 cc of dilute MultiHance MR contrast into the right hip joint prior to MRI of the right hip..  The needle was removed, spot images were obtained and a sterile bandage was placed.     Successful fluoroscopic-guided right hip arthrogram.   Patient was transferred to MRI for further imaging.     MRI HIP RIGHT W CONTRAST    Result Date: 3/8/2024  EXAMINATION: MRI ARTHROGRAM OF THE RIGHT HIP, 3/8/2024 1:10 pm TECHNIQUE: Multiplanar multisequence MRI of the right hip was performed after the administration of intra-articular contrast. COMPARISON: Radiographs 11/30/2023 HISTORY: ORDERING SYSTEM PROVIDED HISTORY: Femoroacetabular impingement of right hip TECHNOLOGIST PROVIDED HISTORY: STAT Creatinine as needed:->Yes Reason for exam:->right hip impingement FINDINGS: BONE MARROW: No evidence of fracture.  Nonspecific hematopoietic marrow signal alteration, which can be attributed to anemia, cigarette smoking, among other things. HIP JOINT: No significant joint effusion. No subchondral cysts or evidence of significant degenerative disease. LABRUM: No appreciable labral tear or paralabral cyst. Normal hip morphology. BURSAE: Mild trochanteric bursitis. SCIATIC NERVE: Normal MRI appearance of the sciatic nerve. MUSCLES / TENDONS: No evidence of gluteal tendon tear. Intact hamstrings tendon origin. No significant muscle edema or atrophy. INTRAPELVIC CONTENTS / SOFT TISSUES: Limited images of the

## 2025-02-22 LAB
FREE KAPPA/LAMBDA RATIO: 1.49 (ref 0.22–1.74)
KAPPA LC FREE SER-MCNC: 22.6 MG/L
LAMBDA LC FREE SERPL-MCNC: 15.2 MG/L (ref 4.2–27.7)

## 2025-03-04 ENCOUNTER — OFFICE VISIT (OUTPATIENT)
Dept: ORTHOPEDIC SURGERY | Age: 50
End: 2025-03-04
Payer: COMMERCIAL

## 2025-03-04 VITALS — HEIGHT: 63 IN | BODY MASS INDEX: 31.89 KG/M2 | TEMPERATURE: 98.6 F | WEIGHT: 180 LBS

## 2025-03-04 DIAGNOSIS — M75.102 TEAR OF LEFT ROTATOR CUFF, UNSPECIFIED TEAR EXTENT, UNSPECIFIED WHETHER TRAUMATIC: Primary | ICD-10-CM

## 2025-03-04 PROCEDURE — 3017F COLORECTAL CA SCREEN DOC REV: CPT | Performed by: FAMILY MEDICINE

## 2025-03-04 PROCEDURE — G8417 CALC BMI ABV UP PARAM F/U: HCPCS | Performed by: FAMILY MEDICINE

## 2025-03-04 PROCEDURE — G8427 DOCREV CUR MEDS BY ELIG CLIN: HCPCS | Performed by: FAMILY MEDICINE

## 2025-03-04 PROCEDURE — 99213 OFFICE O/P EST LOW 20 MIN: CPT | Performed by: FAMILY MEDICINE

## 2025-03-04 PROCEDURE — 1036F TOBACCO NON-USER: CPT | Performed by: FAMILY MEDICINE

## 2025-03-04 ASSESSMENT — ENCOUNTER SYMPTOMS
CHEST TIGHTNESS: 0
ABDOMINAL PAIN: 0
SHORTNESS OF BREATH: 0
VOMITING: 0
CONSTIPATION: 0
COUGH: 0
DIARRHEA: 0
NAUSEA: 0

## 2025-03-04 NOTE — PROGRESS NOTES
Apprehension/Relocation  Rotator cuff: ( - ) Full can, ( + ) Empty can, ( - ) Bear hug, ( - ) Belly press, ( - ) ER weakness        Imaging:  Multiple views of the left shoulder demonstrate no evidence of fracture, degeneration, or dislocation. X-rays were personally reviewed and interpreted by myself. Radiology reports were also reviewed.      ______________________________________________________________________    Assessment & Plan :  1. Tear of left rotator cuff, unspecified tear extent, unspecified whether traumatic  - MRI SHOULDER LEFT WO CONTRAST; Future    Patient presents to the office today for follow of left shoulder pain.  Patient had some relief from the corticosteroid injection but continues to have some pain and weakness in the right rotator cuff.  On physical exam she has weakness and pain with empty can.  She does have fairly well-maintained range of motion.  We discussed treatment options moving forward.  She would like to move forward with an MRI of the left shoulder to further assess internal structures.  Patient will follow-up once the MRI is complete.  Patient is agreeable with the above plan all questions concerns were addressed in the office today.      Due to the severity of symptoms, chronicity of symptoms and physical exam findings, a MRI of the left shoulder will be obtained to further delineate pathology and aid in medical decision making/surgical planning.    Electronically signed by Lonnie Ann DO on 3/4/2025 at 2:08 PM

## 2025-03-06 ENCOUNTER — APPOINTMENT (OUTPATIENT)
Dept: GENERAL RADIOLOGY | Age: 50
End: 2025-03-06
Payer: COMMERCIAL

## 2025-03-06 ENCOUNTER — HOSPITAL ENCOUNTER (EMERGENCY)
Age: 50
Discharge: HOME | End: 2025-03-06
Payer: COMMERCIAL

## 2025-03-06 VITALS
OXYGEN SATURATION: 97 % | RESPIRATION RATE: 20 BRPM | HEART RATE: 70 BPM | BODY MASS INDEX: 31.89 KG/M2 | DIASTOLIC BLOOD PRESSURE: 91 MMHG | SYSTOLIC BLOOD PRESSURE: 137 MMHG | TEMPERATURE: 97.7 F | WEIGHT: 180 LBS

## 2025-03-06 DIAGNOSIS — M25.561 ACUTE PAIN OF RIGHT KNEE: ICD-10-CM

## 2025-03-06 DIAGNOSIS — M25.461 KNEE EFFUSION, RIGHT: Primary | ICD-10-CM

## 2025-03-06 PROCEDURE — 99211 OFF/OP EST MAY X REQ PHY/QHP: CPT

## 2025-03-06 PROCEDURE — 73562 X-RAY EXAM OF KNEE 3: CPT

## 2025-03-06 RX ORDER — KETOROLAC TROMETHAMINE 10 MG/1
10 TABLET, FILM COATED ORAL EVERY 6 HOURS PRN
Qty: 20 TABLET | Refills: 0 | Status: SHIPPED | OUTPATIENT
Start: 2025-03-06 | End: 2025-03-11

## 2025-03-06 ASSESSMENT — PAIN - FUNCTIONAL ASSESSMENT: PAIN_FUNCTIONAL_ASSESSMENT: 0-10

## 2025-03-06 ASSESSMENT — PAIN SCALES - GENERAL: PAINLEVEL_OUTOF10: 5

## 2025-03-06 NOTE — ED PROVIDER NOTES
[facility] URGENT CARE  ED  Encounter Note  Admit Date/RoomTime: 3/6/2025  5:54 PM  ED Room:   NAME: Gualberto Schwartz  : 1975  MRN: [id number]  PCP: Fidelina Lam MD    CHIEF COMPLAINT     Knee Pain (Pt stated that for about 2 weeks she has been having right knee pain with 0 injury)    HISTORY OF PRESENT ILLNESS        Gualberto Schwartz is a 49 y.o. female who presents to the ED by private vehicle for right knee pain, beginning today ago. The complaint has been persistent and are moderate in severity.                                 Right knee pain, ability completely extend leg, while taking shower.  Denies torsion injury recent fall blunt trauma    Past medical history fibromyalgia      REVIEW OF SYSTEMS     Pertinent positives and negatives are stated within HPI, all other systems reviewed and are negative.    Past Medical History:  has a past medical history of Anemia, Anxiety, Arthritis, Asthma, Chronic back pain, DDD (degenerative disc disease), Depression, Fibromyalgia, Fibromyalgia, IBS (irritable bowel syndrome), Migraines, Pain, and PTSD (post-traumatic stress disorder).  Surgical History:  has a past surgical history that includes Tonsillectomy; knee surgery; shoulder surgery; Abdominal exploration surgery; other surgical history; other surgical history (1/10/13); Spine surgery (1/10/13); Nerve Block (Bilateral, 10 29 13); Nerve Block (13); Abdomen surgery; Colonoscopy; Hysterectomy; and Appendectomy.  Social History:  reports that she has never smoked. She has been exposed to tobacco smoke. She has never used smokeless tobacco. She reports that she does not currently use alcohol. She reports that she does not use drugs.  Family History: family history includes Diabetes in her father; Emphysema in her father; High Blood Pressure in her father.   Allergies: Codeine, Hydrocodone, Oxycodone, Oxycontin [oxycodone hcl], Cortisone, and Rexulti [brexpiprazole]  CURRENT MEDICATIONS

## 2025-03-06 NOTE — DISCHARGE INSTRUCTIONS
Ace wrap to the knee when standing walking.  May use knee sleeve with stabilizers instead of Ace wrap  Begin ketorolac 10 mg@ 1 tablet every 6 hours for pain swelling  Avoid 1.  Standing  No strenuous activity  No exercise programs with the right knee  Follow-up with your primary care provider next 5 to 7 days

## 2025-04-01 ENCOUNTER — RESULTS FOLLOW-UP (OUTPATIENT)
Dept: ORTHOPEDIC SURGERY | Age: 50
End: 2025-04-01

## 2025-04-01 ASSESSMENT — ENCOUNTER SYMPTOMS
NAUSEA: 0
CHEST TIGHTNESS: 0
DIARRHEA: 0
CONSTIPATION: 0
SHORTNESS OF BREATH: 0
VOMITING: 0
ABDOMINAL PAIN: 0
COUGH: 0

## 2025-04-01 NOTE — PROGRESS NOTES
[facility]  PRIMARY CARE SPORTS MEDICINE  DATE OF VISIT : 2025    Patient : Gualberto Schwartz  Age : 49 y.o.   : 1975  MRN : [id number]   ______________________________________________________________________    Chief Complaint :   Chief Complaint   Patient presents with    Follow-up     Left shoulder f/u. Patient here to discuss MRI results. States she is still having discomfort. Describes pain as aching. Pain level today 4/10       HPI : Gualberto Schwartz is 49 y.o. right hand dominant female who presented to the clinic for evaluation of left shoulder pain.     Today 2025, she presents for follow up of left shoulder pain with MRI results.  See MRI results below.  Patient states that she does continue to have some discomfort.  She localizes it to the anterior shoulder over the AC joint.  She describes as aching and rates as a 4/10.  She does feel this is slightly better from the last visit.  Pain is worse when reaching overhead or reaching across her body.    On 2025, she presents for follow up of left rotator cuff tendinitis.  Patient had about 60% relief from the left subacromial subdeltoid corticosteroid injection.  She did have some subsequent headache and nausea for days after the injection.  Describes this as sharp and shooting.  Pain is worse with reaching overhead and behind her.  She did have a seizure recently and is unable to drive.    On 2025, she says the pain started 3 week(s) ago. The pain started with no unknown injury and has not changed since onset. She localizes the pain to the lateral shoulder. It does radiate to the posterior shoulder and into the shoulder blade. She rates the pain 4-6/10 in the office today. The pain is worse with reaching across her body or reaching, reaching overhead. She has tried treating it with thermacare patches and motrin and tylenol. Denies numbness and tingling    ROS:  All pertinent positive symptoms are included in the history of

## 2025-04-02 ENCOUNTER — OFFICE VISIT (OUTPATIENT)
Dept: ORTHOPEDIC SURGERY | Age: 50
End: 2025-04-02

## 2025-04-02 VITALS — BODY MASS INDEX: 33.13 KG/M2 | WEIGHT: 180 LBS | HEIGHT: 62 IN | TEMPERATURE: 98.6 F

## 2025-04-02 DIAGNOSIS — M67.814 TENDINOSIS OF LEFT ROTATOR CUFF: ICD-10-CM

## 2025-04-02 DIAGNOSIS — M19.012 ARTHRITIS OF LEFT ACROMIOCLAVICULAR JOINT: Primary | ICD-10-CM

## 2025-04-02 DIAGNOSIS — M75.52 SUBACROMIAL BURSITIS OF LEFT SHOULDER JOINT: ICD-10-CM

## 2025-04-02 RX ORDER — LIDOCAINE HYDROCHLORIDE 10 MG/ML
1 INJECTION, SOLUTION INFILTRATION; PERINEURAL ONCE
Status: DISCONTINUED | OUTPATIENT
Start: 2025-04-02 | End: 2025-04-02

## 2025-04-02 RX ORDER — TRIAMCINOLONE ACETONIDE 40 MG/ML
20 INJECTION, SUSPENSION INTRA-ARTICULAR; INTRAMUSCULAR ONCE
Status: COMPLETED | OUTPATIENT
Start: 2025-04-02 | End: 2025-04-02

## 2025-04-02 RX ORDER — LIDOCAINE HYDROCHLORIDE 10 MG/ML
0.5 INJECTION, SOLUTION INFILTRATION; PERINEURAL ONCE
Status: COMPLETED | OUTPATIENT
Start: 2025-04-02 | End: 2025-04-02

## 2025-04-02 RX ORDER — TRIAMCINOLONE ACETONIDE 40 MG/ML
40 INJECTION, SUSPENSION INTRA-ARTICULAR; INTRAMUSCULAR ONCE
Status: DISCONTINUED | OUTPATIENT
Start: 2025-04-02 | End: 2025-04-02

## 2025-04-02 RX ADMIN — TRIAMCINOLONE ACETONIDE 20 MG: 40 INJECTION, SUSPENSION INTRA-ARTICULAR; INTRAMUSCULAR at 13:41

## 2025-04-02 RX ADMIN — LIDOCAINE HYDROCHLORIDE 0.5 ML: 10 INJECTION, SOLUTION INFILTRATION; PERINEURAL at 13:40

## 2025-04-21 DIAGNOSIS — E61.1 IRON DEFICIENCY: ICD-10-CM

## 2025-04-21 RX ORDER — FERROUS SULFATE 325(65) MG
325 TABLET ORAL
Qty: 180 TABLET | Refills: 1 | Status: SHIPPED | OUTPATIENT
Start: 2025-04-21

## 2025-04-23 LAB
ALBUMIN: NORMAL
ALP BLD-CCNC: NORMAL U/L
ALT SERPL-CCNC: NORMAL U/L
ANION GAP SERPL CALCULATED.3IONS-SCNC: NORMAL MMOL/L
AST SERPL-CCNC: NORMAL U/L
BILIRUB SERPL-MCNC: NORMAL MG/DL
BUN BLDV-MCNC: NORMAL MG/DL
CALCIUM SERPL-MCNC: NORMAL MG/DL
CHLORIDE BLD-SCNC: NORMAL MMOL/L
CO2: NORMAL
CREAT SERPL-MCNC: NORMAL MG/DL
GFR, ESTIMATED: NORMAL
GLUCOSE BLD-MCNC: NORMAL MG/DL
POTASSIUM SERPL-SCNC: NORMAL MMOL/L
SED RATE, AUTOMATED: NORMAL
SODIUM BLD-SCNC: NORMAL MMOL/L
TOTAL PROTEIN: NORMAL

## 2025-05-15 ENCOUNTER — OFFICE VISIT (OUTPATIENT)
Dept: PRIMARY CARE CLINIC | Age: 50
End: 2025-05-15

## 2025-05-15 VITALS
OXYGEN SATURATION: 97 % | WEIGHT: 175.9 LBS | DIASTOLIC BLOOD PRESSURE: 80 MMHG | HEART RATE: 66 BPM | HEIGHT: 62 IN | SYSTOLIC BLOOD PRESSURE: 122 MMHG | BODY MASS INDEX: 32.37 KG/M2 | TEMPERATURE: 97.3 F

## 2025-05-15 DIAGNOSIS — F32.A DEPRESSIVE DISORDER: ICD-10-CM

## 2025-05-15 DIAGNOSIS — F41.8 OTHER SPECIFIED ANXIETY DISORDERS: ICD-10-CM

## 2025-05-15 DIAGNOSIS — M99.03 SOMATIC DYSFUNCTION OF LUMBAR REGION: Chronic | ICD-10-CM

## 2025-05-15 DIAGNOSIS — M75.100 NONTRAUMATIC TEAR OF ROTATOR CUFF, UNSPECIFIED LATERALITY, UNSPECIFIED TEAR EXTENT: ICD-10-CM

## 2025-05-15 DIAGNOSIS — E78.00 PRIMARY HYPERCHOLESTEROLEMIA: ICD-10-CM

## 2025-05-15 DIAGNOSIS — M79.7 FIBROMYALGIA: Primary | Chronic | ICD-10-CM

## 2025-05-15 DIAGNOSIS — M25.511 RIGHT SHOULDER PAIN, UNSPECIFIED CHRONICITY: ICD-10-CM

## 2025-05-15 DIAGNOSIS — D50.8 OTHER IRON DEFICIENCY ANEMIA: ICD-10-CM

## 2025-05-15 DIAGNOSIS — M96.1 LUMBAR POSTLAMINECTOMY SYNDROME: ICD-10-CM

## 2025-05-15 DIAGNOSIS — Z12.31 BREAST CANCER SCREENING BY MAMMOGRAM: ICD-10-CM

## 2025-05-15 NOTE — PROGRESS NOTES
Chief Complaint   Patient presents with    Follow-up     Patient is a 4 month F/U, and states she has been feeling anxious with some depression and is going to Psych. Next week.        HPI:  Patient is here for follow-up of major depressive disorder fibromyalgia recent seizure disorder believed to be medication induced.  Patient is doing well, compliant with medications.  She denies any seizure activity since her last 1 and that was related to the medication she was on at that time.    She has been to hematology and has been worked up for possible multiple myeloma with mildly abnormal blood work which has been followed over the last 6 months and has been improving with mild elevation of light chains.  She was placed on iron supplement.    Blood work was discussed with her hemoglobin count is above 15 and patient was advised to discontinue her iron supplement at this point.  Rest of lab work are within reasonable range.  Her last lipid panel was 3 years ago and she was borderline at that point so we will repeat that again as patient is inquiring about her risk of developing coronary artery disease as she has a strong family history .  She never had any symptomatology and she was never a smoker.      She has been following with psychiatry and she feels that she might need increase of her medication dose and she will discuss it with them next week.    She also had been following up with orthopedic surgery with left shoulder injections.  MRI of the shoulder was reviewed with the rotator cuff tear present and some tendinosis.    She was counseled to get a mammogram.    Past Medical History, Surgical History, and Family History has been reviewed and updated.    Review of Systems:  Constitutional:  No fever, no fatigue, no chills, no headaches, no weight change  Dermatology:  No rash, no mole, no dry or sensitive skin  ENT:  No cough, no sore throat, no sinus pain, no runny nose, no ear pain  Cardiology:  No chest pain, no

## 2025-06-23 NOTE — PROGRESS NOTES
"Date of Service: 6/26/2025  Patient: Jabier Lara  MRN: [id number]  Referring Provider: No ref. provider found  PCP: Dylan Jones MD    History of Present Illness:   Ms. Lara is a 49 y.o. female who presents to neurology clinic for follow up of first time seizure. Jabier Lara's past medical history is pertinent for iron deficiency anemia, fibromyalgia, anxiety/depression.     Virtual Consent    An interactive audio and video telecommunication system which permits real time communications between the patient (at the originating site) and provider (at the distant site) was utilized to provide this telehealth service.   Verbal consent was requested and obtained from Jabier Lara on this date, 06/26/25 for a telehealth visit and the patient's location was confirmed at the time of the visit.     Interval History  She denies any recurrent seizure-like episodes.  Overall she is doing well.    Initial History    The patient was seen 12/15/24 at [facility]. She patient was helping her son move the day of admission and was becoming winded after climbing multiple flights of stairs. She started to feel \"intoxicated\" and weak and told her son she was \"going to go down.\" She lost consciousness and her son caught her. No head trauma. Her arms were outstretched with hands extended at the wrists. Her arms started to shake. She was drooling. Her son reports that the shaking lasted no more than a minutes. She then would open her eyes briefly and look around but then quickly start snoring.  The patient does not recall the episode. She remembers feeling like she was going to pass out and then next remembers waking in the ambulance.     She reports one prior episode of loss of consciousness a few years ago in which she was reportedly hypoglycemic and passed out briefly. No convulsions at that time.     Routine EEG was negative.    EPILEPSY RISK FACTORS:  Gestation and Birth: normal  Febrile Seizures: " Denies  Milestones: Normal  CNS Infections: Denies  CNS Surgeries: Denies  Head Trauma: Denies  FHx of Seizures: Denies  Currently Driving?: No    Review of Systems:  The systems were reviewed with pertinent positives and negatives documented in the HPI.      Past Medical & Surgical History  Iron deficiency anemia, fibromyalgia, anxiety/depression    Social History:   Social History     Tobacco Use    Smoking status: Never    Smokeless tobacco: Never   Substance Use Topics    Alcohol use: Never     Family History:   No family history of seizures     Medications:     Current Outpatient Medications:     busPIRone (Buspar) 7.5 mg tablet, Take 1 tablet (7.5 mg) by mouth every 12 hours., Disp: , Rfl:     desvenlafaxine 50 mg 24 hr tablet, Take 1 tablet (50 mg) by mouth once daily., Disp: , Rfl:     FeroSuL tablet, Take 1 tablet (325 mg) by mouth once daily in the morning. Take before meals., Disp: , Rfl:     hydrOXYzine pamoate (Vistaril) 25 mg capsule, Take 2 capsules (50 mg) by mouth 4 times a day as needed for itching or allergies., Disp: , Rfl:     pregabalin (Lyrica) 150 mg capsule, Take 1 capsule (150 mg) by mouth 3 times a day., Disp: , Rfl:     tiZANidine (Zanaflex) 4 mg tablet, Take 1 tablet (4 mg) by mouth every 8 hours if needed for muscle spasms., Disp: , Rfl:     traZODone (Desyrel) 50 mg tablet, Take 1 tablet (50 mg) by mouth once daily at bedtime., Disp: , Rfl:      General Physical Exam:  There were no vitals taken for this visit.     General Physical Exam:    She looks well and is not in any acute distress. Breathing comfortably on room air.     Neurological Exam:   Exam limited due to virtual video visit.    Mental status reveals her to be alert and oriented. Speech is intact to conversation.     Cranial nerves:  CN 3, 4, 6   Lids symmetric; no ptosis.  CN 7   Normal and symmetric smile   CN 8   Hearing intact to conversation.      Motor:  No pronator drift bilaterally in the upper  "extremities    Results:    Lab Results   Component Value Date    HGBA1C 5.6 11/28/2022     Lab Results   Component Value Date    CKTOTAL 142 12/14/2024     CBC:   Lab Results   Component Value Date    WBC 6.6 12/15/2024    HGB 13.8 12/15/2024    HCT 41.4 12/15/2024     12/15/2024     BMP:   Lab Results   Component Value Date     12/15/2024    K 3.9 12/15/2024     12/15/2024    CO2 27 12/15/2024    BUN 7 12/15/2024    CREATININE 0.97 12/15/2024    CALCIUM 8.7 12/15/2024    MG 2.08 12/15/2024    PHOS 3.1 12/15/2024     LFT:   Lab Results   Component Value Date    ALKPHOS 93 12/14/2024    BILITOT 0.5 12/14/2024    PROT 7.1 12/14/2024    ALBUMIN 3.8 12/15/2024    ALT 16 12/14/2024    AST 18 12/14/2024     Routine EEG  12/30/2024  This is a normal awake EEG. No epileptiform discharges or lateralizing signs were seen.    Imaging  MRI brain wwo contrast  1. No evidence of acute infarct, intracranial mass effect or midline shift.  2. Mild white matter FLAIR signal increase most commonly seen with early chronic small vessel ischemic change.    Impression:  Jabier Lara is a 49 y.o. who presents follow up after first time seizure. Episode described as feeling \"intoxicated\" after exerting herself while helping her son move. She lost consciousness and her son reports arms outstretched with generalized shaking for about a minute. The patient had post ictal confusion and an elevated lactate on arrival to ED. Neurological exam was normal after the episode. MRI brain with and without ctornast mild chronic small vessel ischemic changes. Routine EEG was negative. No evidence of focal onset based on description. Description of episode is concerning for first time seizure. Seizure possibly provoked by strenuous physical activity with dehydration given PETTY found on lab work up. She was also recently starting on rexulti at the time of the event.     No recurrently seizure like activity since. As this was a first " time seizure likely provoked without focality or abnormalities on MRI brain or EEG, seizure medications were not started.    She has been seizure free for 6 months and is cleared to drive.    She will follow up as needed.     Reviewed and approved by DEMAR MACHADO on 6/23/25 at 2:34 PM.    Medical decision making was low complexity. The patient was seen and evaluated for 1 stable acute problem. I personally reviewed multiple test results from multiple sources in the EHR.

## 2025-06-26 ENCOUNTER — APPOINTMENT (OUTPATIENT)
Dept: NEUROLOGY | Facility: CLINIC | Age: 50
End: 2025-06-26
Payer: COMMERCIAL

## 2025-06-26 DIAGNOSIS — R56.9 SEIZURE (MULTI): Primary | ICD-10-CM

## 2025-06-26 PROCEDURE — 99213 OFFICE O/P EST LOW 20 MIN: CPT | Performed by: STUDENT IN AN ORGANIZED HEALTH CARE EDUCATION/TRAINING PROGRAM

## 2025-06-26 PROCEDURE — 1036F TOBACCO NON-USER: CPT | Performed by: STUDENT IN AN ORGANIZED HEALTH CARE EDUCATION/TRAINING PROGRAM

## 2025-07-15 ENCOUNTER — OFFICE VISIT (OUTPATIENT)
Dept: PRIMARY CARE CLINIC | Age: 50
End: 2025-07-15
Payer: COMMERCIAL

## 2025-07-15 VITALS
HEIGHT: 62 IN | DIASTOLIC BLOOD PRESSURE: 72 MMHG | HEART RATE: 74 BPM | SYSTOLIC BLOOD PRESSURE: 120 MMHG | OXYGEN SATURATION: 99 % | TEMPERATURE: 97.3 F | WEIGHT: 170.1 LBS | BODY MASS INDEX: 31.3 KG/M2

## 2025-07-15 DIAGNOSIS — M62.838 NECK MUSCLE SPASM: ICD-10-CM

## 2025-07-15 DIAGNOSIS — K58.2 IRRITABLE BOWEL SYNDROME WITH BOTH CONSTIPATION AND DIARRHEA: ICD-10-CM

## 2025-07-15 DIAGNOSIS — L03.113 CELLULITIS OF RIGHT UPPER EXTREMITY: ICD-10-CM

## 2025-07-15 DIAGNOSIS — T63.301A SPIDER BITE WOUND, ACCIDENTAL OR UNINTENTIONAL, INITIAL ENCOUNTER: ICD-10-CM

## 2025-07-15 DIAGNOSIS — G44.209 TENSION HEADACHE: Primary | ICD-10-CM

## 2025-07-15 DIAGNOSIS — M51.360 DEGENERATION OF INTERVERTEBRAL DISC OF LUMBAR REGION WITH DISCOGENIC BACK PAIN: ICD-10-CM

## 2025-07-15 PROCEDURE — G8417 CALC BMI ABV UP PARAM F/U: HCPCS | Performed by: INTERNAL MEDICINE

## 2025-07-15 PROCEDURE — G8427 DOCREV CUR MEDS BY ELIG CLIN: HCPCS | Performed by: INTERNAL MEDICINE

## 2025-07-15 PROCEDURE — 99214 OFFICE O/P EST MOD 30 MIN: CPT | Performed by: INTERNAL MEDICINE

## 2025-07-15 PROCEDURE — 1036F TOBACCO NON-USER: CPT | Performed by: INTERNAL MEDICINE

## 2025-07-15 PROCEDURE — 3017F COLORECTAL CA SCREEN DOC REV: CPT | Performed by: INTERNAL MEDICINE

## 2025-07-15 RX ORDER — TRAZODONE HYDROCHLORIDE 50 MG/1
50 TABLET ORAL NIGHTLY PRN
COMMUNITY
Start: 2025-05-19

## 2025-07-15 RX ORDER — CLOBETASOL PROPIONATE 0.5 MG/G
OINTMENT TOPICAL
Qty: 30 G | Refills: 0 | Status: SHIPPED | OUTPATIENT
Start: 2025-07-15

## 2025-07-15 RX ORDER — CYCLOBENZAPRINE HCL 10 MG
10 TABLET ORAL 3 TIMES DAILY PRN
Qty: 30 TABLET | Refills: 1 | Status: SHIPPED | OUTPATIENT
Start: 2025-07-15 | End: 2025-08-04

## 2025-07-15 RX ORDER — DESVENLAFAXINE 100 MG/1
100 TABLET, EXTENDED RELEASE ORAL DAILY
COMMUNITY
Start: 2025-07-08

## 2025-07-15 NOTE — PROGRESS NOTES
Chief Complaint   Patient presents with    Headache     Patient is an acute with headaches that started about 1 week ago, and has a reddened area that is itchy on right thigh, possibly from a bite. Patient also has an area on her right inner elbow that she was cut by a nail in her garage.        HPI:  Patient is here for follow-up of chronic low back pain chronic depression and anxiety.  Patient is doing well compliant with medication and stable on current meds.    .  Patient complains of headaches that have been happening over the last month intermittently.  She is describing them as dull headaches sometimes starting in the back of the neck and spreading all over her scalp and some other times starting around her orbital cavity.  She has been under some stress lately more than usual.  The only medication that was changed was the dosage of the BuSpar was increased to 10 mg twice a day.  She denied having any sinus drainage or pressure or any evidence of sinusitis.    Patient was counseled that this is probably significant for tension headache.  She has been on tizanidine 4 mg 3 times a day for quite some time for her low back pain.  We will switch her to Flexeril 10 mg 3 times a day hoping that this might help with the tension headache.  She is also asked to use some ibuprofen or Tylenol extra strength 2-3 times a day.    She had that headache intermittently over the course of the last few years and she had a CT scan of the head back in 23 which was negative at that time.  She also had a CT of the cervical spine at that time and that was also negative.    She is also complaining about an insect bite on her right thigh that was there about 2 weeks ago and now it is in a healing stage but it was very itchy and spread about 1 inch in diameter with erythematous skin.  Now it appears to be in the healing stage but she still have some itching once in a while.  Will start her on Temovate ointment twice a day.    She is